# Patient Record
Sex: FEMALE | Race: WHITE | NOT HISPANIC OR LATINO | Employment: OTHER | URBAN - METROPOLITAN AREA
[De-identification: names, ages, dates, MRNs, and addresses within clinical notes are randomized per-mention and may not be internally consistent; named-entity substitution may affect disease eponyms.]

---

## 2015-09-28 LAB
ALT SERPL-CCNC: 19 UNITS/L (ref 9–52)
AST SERPL-CCNC: 23 UNITS/L (ref 14–36)
CHOLEST SERPL-MCNC: 187 MG/DL (ref 50–200)
CREAT SERPL-MCNC: 1 MG/DL (ref 0.5–1)
GLUCOSE SERPL-MCNC: 88 MG/DL (ref 65–105)
HDLC SERPL-MCNC: 34 MG/DL (ref 36–68)
LDLC SERPL CALC-MCNC: 118.2 MG/DL (ref 0–160)
POTASSIUM SERPL-SCNC: 3.6 MMOL/L (ref 3.5–5)
TRIGL SERPL-MCNC: 174 MG/DL (ref 10–250)

## 2016-04-20 LAB — TSH SERPL-ACNC: 3.35 MIU/ML (ref 0.49–4.67)

## 2017-04-22 ENCOUNTER — TRANSFERRED RECORDS (OUTPATIENT)
Dept: HEALTH INFORMATION MANAGEMENT | Facility: CLINIC | Age: 77
End: 2017-04-22

## 2017-05-11 ENCOUNTER — TRANSFERRED RECORDS (OUTPATIENT)
Dept: HEALTH INFORMATION MANAGEMENT | Facility: CLINIC | Age: 77
End: 2017-05-11

## 2017-05-11 LAB
ALT SERPL-CCNC: 27 UNITS/L (ref 9–52)
AST SERPL-CCNC: 18 UNITS/L (ref 14–36)
CHOLEST SERPL-MCNC: 299 MG/DL (ref 50–200)
CREAT SERPL-MCNC: 0.9 MG/DL (ref 0.5–1)
GLUCOSE SERPL-MCNC: 81 MG/DL (ref 65–105)
HDLC SERPL-MCNC: 42 MG/DL (ref 36–68)
LDLC SERPL CALC-MCNC: 227 MG/DL (ref 0–160)
POTASSIUM SERPL-SCNC: 4 MMOL/L (ref 3.6–5)
TRIGL SERPL-MCNC: 150 MG/DL (ref 10–250)

## 2017-11-13 NOTE — PROGRESS NOTES
"  SUBJECTIVE:   Swati Marinelli is a 77 year old female who presents to clinic today for the following health issues:      New Patient/Transfer of Care    {ACUTE Problem - extended histories:101448}    Problem list and histories reviewed & adjusted, as indicated.  Additional history: {NONE - AS DOCUMENTED:149741::\"as documented\"}    {HIST REVIEW/ LINKS 2:782696}    Reviewed and updated as needed this visit by clinical staff       Reviewed and updated as needed this visit by Provider         {PROVIDER CHARTING PREFERENCE:325276}  "

## 2017-11-14 ENCOUNTER — OFFICE VISIT (OUTPATIENT)
Dept: FAMILY MEDICINE | Facility: CLINIC | Age: 77
End: 2017-11-14
Payer: MEDICARE

## 2017-11-14 VITALS
HEART RATE: 104 BPM | HEIGHT: 60 IN | SYSTOLIC BLOOD PRESSURE: 133 MMHG | WEIGHT: 156.6 LBS | OXYGEN SATURATION: 92 % | TEMPERATURE: 97.7 F | BODY MASS INDEX: 30.74 KG/M2 | DIASTOLIC BLOOD PRESSURE: 72 MMHG

## 2017-11-14 DIAGNOSIS — Z71.89 ADVANCED DIRECTIVES, COUNSELING/DISCUSSION: ICD-10-CM

## 2017-11-14 DIAGNOSIS — M79.671 BILATERAL FOOT PAIN: ICD-10-CM

## 2017-11-14 DIAGNOSIS — M79.672 BILATERAL FOOT PAIN: ICD-10-CM

## 2017-11-14 DIAGNOSIS — F41.9 ANXIETY: ICD-10-CM

## 2017-11-14 DIAGNOSIS — G47.33 OSA (OBSTRUCTIVE SLEEP APNEA): ICD-10-CM

## 2017-11-14 DIAGNOSIS — I10 HYPERTENSION GOAL BP (BLOOD PRESSURE) < 150/90: Primary | ICD-10-CM

## 2017-11-14 DIAGNOSIS — I35.1 NONRHEUMATIC AORTIC VALVE INSUFFICIENCY: ICD-10-CM

## 2017-11-14 PROBLEM — R55 NEUROCARDIOGENIC SYNCOPE: Status: ACTIVE | Noted: 2017-11-14

## 2017-11-14 PROBLEM — E78.2 MIXED HYPERLIPIDEMIA: Status: ACTIVE | Noted: 2017-11-14

## 2017-11-14 PROCEDURE — 99203 OFFICE O/P NEW LOW 30 MIN: CPT | Performed by: FAMILY MEDICINE

## 2017-11-14 RX ORDER — BUSPIRONE HYDROCHLORIDE 10 MG/1
10 TABLET ORAL 3 TIMES DAILY
COMMUNITY
End: 2018-01-23

## 2017-11-14 RX ORDER — CETIRIZINE HYDROCHLORIDE 5 MG/1
5 TABLET ORAL DAILY
COMMUNITY
End: 2019-01-14

## 2017-11-14 RX ORDER — ROSUVASTATIN CALCIUM 5 MG/1
5 TABLET, COATED ORAL DAILY
COMMUNITY
End: 2017-12-12

## 2017-11-14 RX ORDER — LISINOPRIL AND HYDROCHLOROTHIAZIDE 20; 25 MG/1; MG/1
1 TABLET ORAL DAILY
COMMUNITY
End: 2017-12-12

## 2017-11-14 RX ORDER — PANTOPRAZOLE SODIUM 40 MG/1
40 TABLET, DELAYED RELEASE ORAL DAILY
COMMUNITY
End: 2019-04-24

## 2017-11-14 RX ORDER — CITALOPRAM HYDROBROMIDE 20 MG/1
20 TABLET ORAL DAILY
COMMUNITY
End: 2017-12-12

## 2017-11-14 RX ORDER — VERAPAMIL HYDROCHLORIDE 360 MG/1
1 CAPSULE, DELAYED RELEASE PELLETS ORAL DAILY
COMMUNITY
End: 2017-12-12

## 2017-11-14 RX ORDER — CLOPIDOGREL BISULFATE 75 MG/1
75 TABLET ORAL DAILY
COMMUNITY
End: 2017-12-12

## 2017-11-14 RX ORDER — POTASSIUM CHLORIDE 1.5 G/1.58G
20 POWDER, FOR SOLUTION ORAL DAILY
COMMUNITY
End: 2017-12-12

## 2017-11-14 RX ORDER — LORATADINE 10 MG/1
10 TABLET ORAL DAILY
COMMUNITY
End: 2018-07-11 | Stop reason: ALTCHOICE

## 2017-11-14 ASSESSMENT — PATIENT HEALTH QUESTIONNAIRE - PHQ9
SUM OF ALL RESPONSES TO PHQ QUESTIONS 1-9: 7
5. POOR APPETITE OR OVEREATING: MORE THAN HALF THE DAYS

## 2017-11-14 ASSESSMENT — ANXIETY QUESTIONNAIRES
6. BECOMING EASILY ANNOYED OR IRRITABLE: NOT AT ALL
IF YOU CHECKED OFF ANY PROBLEMS ON THIS QUESTIONNAIRE, HOW DIFFICULT HAVE THESE PROBLEMS MADE IT FOR YOU TO DO YOUR WORK, TAKE CARE OF THINGS AT HOME, OR GET ALONG WITH OTHER PEOPLE: SOMEWHAT DIFFICULT
3. WORRYING TOO MUCH ABOUT DIFFERENT THINGS: MORE THAN HALF THE DAYS
GAD7 TOTAL SCORE: 9
2. NOT BEING ABLE TO STOP OR CONTROL WORRYING: MORE THAN HALF THE DAYS
5. BEING SO RESTLESS THAT IT IS HARD TO SIT STILL: NOT AT ALL
1. FEELING NERVOUS, ANXIOUS, OR ON EDGE: NEARLY EVERY DAY
7. FEELING AFRAID AS IF SOMETHING AWFUL MIGHT HAPPEN: NOT AT ALL

## 2017-11-14 ASSESSMENT — PAIN SCALES - GENERAL: PAINLEVEL: NO PAIN (0)

## 2017-11-14 NOTE — MR AVS SNAPSHOT
After Visit Summary   11/14/2017    Swati Marinelli    MRN: [id number]           Patient Information     Date Of Birth          1940        Visit Information        Provider Department      11/14/2017 8:15 AM Deborah Terrell MD [facility]        Today's Diagnoses     MANNY (obstructive sleep apnea)    -  1    Advanced directives, counseling/discussion        Hypertension goal BP (blood pressure) < 150/90        Anxiety        Bilateral foot pain          Care Instructions      Plan for fasting labs and a visit with Deborah Terrell MD in early January, sooner if you need it.                Follow-ups after your visit        Additional Services     PODIATRY/FOOT & ANKLE SURGERY REFERRAL       Your provider has referred you to: [facility]: [facility] [phone]   [email or web address]    Please be aware that coverage of these services is subject to the terms and limitations of your health insurance plan.  Call member services at your health plan with any benefit or coverage questions.      Please bring the following to your appointment:  >>   Any x-rays, CTs or MRIs which have been performed.  Contact the facility where they were done to arrange for  prior to your scheduled appointment.    >>   List of current medications   >>   This referral request   >>   Any documents/labs given to you for this referral            SLEEP EVALUATION & MANAGEMENT REFERRAL - ADULT -[facility] [place]  [phone] (Age 15 and up)       Please be aware that coverage of these services is subject to the terms and limitations of your health insurance plan.  Call member services at your health plan with any benefit or coverage questions.      Please bring the following to your appointment:    >>   List of current medications   >>   This referral request   >>   Any documents/labs given to you for this referral                      Future tests that  "were ordered for you today     Open Future Orders        Priority Expected Expires Ordered    SLEEP EVALUATION & MANAGEMENT REFERRAL - ADULT -[facility] [place]  [phone] (Age 15 and up) Routine  2018            Who to contact     If you have questions or need follow up information about today's clinic visit or your schedule please contact [facility] directly at [phone].  Normal or non-critical lab and imaging results will be communicated to you by Microtunehart, letter or phone within 4 business days after the clinic has received the results. If you do not hear from us within 7 days, please contact the clinic through anfixt or phone. If you have a critical or abnormal lab result, we will notify you by phone as soon as possible.  Submit refill requests through [email or web address] or call your pharmacy and they will forward the refill request to us. Please allow 3 business days for your refill to be completed.          Additional Information About Your Visit        [email or web address] Information     [email or web address] lets you send messages to your doctor, view your test results, renew your prescriptions, schedule appointments and more. To sign up, go to [email or web address] . Click on \"Log in\" on the left side of the screen, which will take you to the Welcome page. Then click on \"Sign up Now\" on the right side of the page.     You will be asked to enter the access code listed below, as well as some personal information. Please follow the directions to create your username and password.     Your access code is: [email or web address]  Expires: 2018  9:11 AM     Your access code will  in 90 days. If you need help or a new code, please call your [facility] or [phone].        Care EveryWhere ID     This is your Care EveryWhere ID. This could be used by other organizations to access your [place] medical records  [id number]        Your Vitals Were     Pulse Temperature Height Pulse " "Oximetry BMI (Body Mass Index)       104 97.7  F (36.5  C) (Oral) 4' 11.5\" (1.511 m) 92% 31.1 kg/m2        Blood Pressure from Last 3 Encounters:   11/14/17 133/72    Weight from Last 3 Encounters:   11/14/17 156 lb 9.6 oz (71 kg)              We Performed the Following     PODIATRY/FOOT & ANKLE SURGERY REFERRAL        Primary Care Provider Office Phone # Fax #    Deborah Gisela Terrell [phone] [address] [facility] [address]        Equal Access to Services     [facility]: Hadii aad ku hadasho Soomaali, waaxda luqadaha, qaybta kaalmada adeegyada, rishi talavera hayaan tony shaffer . So [facility] [phone].    ATENCIÓN: Si habla español, tiene a apodaca disposición servicios gratuitos de asistencia lingüística. [facility] [phone].    We comply with applicable federal civil rights laws and [place] laws. We do not discriminate on the basis of race, color, national origin, age, disability, sex, sexual orientation, or gender identity.            Thank you!     Thank you for choosing [facility]  for your care. Our goal is always to provide you with excellent care. Hearing back from our patients is one way we can continue to improve our services. Please take a few minutes to complete the written survey that you may receive in the mail after your visit with us. Thank you!             Your Updated Medication List - Protect others around you: Learn how to safely use, store and throw away your medicines at www.disposemymeds.org.          This list is accurate as of: 11/14/17  9:11 AM.  Always use your most recent med list.                   Brand Name Dispense Instructions for use Diagnosis    busPIRone 10 MG tablet    BUSPAR     Take 10 mg by mouth 3 times daily        celeXA 20 MG tablet   Generic drug:  citalopram      Take 20 mg by mouth daily        cetirizine 5 MG tablet    zyrTEC     Take 5 mg by mouth daily        CRESTOR 5 MG tablet   Generic drug:  rosuvastatin     "  Take 5 mg by mouth daily        lisinopril-hydrochlorothiazide 20-25 MG per tablet    PRINZIDE/ZESTORETIC     Take 1 tablet by mouth daily        loratadine 10 MG tablet    CLARITIN     Take 10 mg by mouth daily        PLAVIX 75 MG tablet   Generic drug:  clopidogrel      Take 75 mg by mouth daily        potassium chloride 20 MEQ Packet    KLOR-CON     Take 20 mEq by mouth daily        PROTONIX 40 MG EC tablet   Generic drug:  pantoprazole      Take 40 mg by mouth daily        verapamil HCl  MG Cp24      Take 1 capsule by mouth daily

## 2017-11-14 NOTE — PATIENT INSTRUCTIONS
Plan for fasting labs and a visit with Deborah Terrell MD in early January, sooner if you need it.

## 2017-11-14 NOTE — NURSING NOTE
"Chief Complaint   Patient presents with     Establish Care       Initial /72  Pulse 104  Temp 97.7  F (36.5  C) (Oral)  Ht 4' 11.5\" (1.511 m)  Wt 156 lb 9.6 oz (71 kg)  SpO2 92%  BMI 31.1 kg/m2 Estimated body mass index is 31.1 kg/(m^2) as calculated from the following:    Height as of this encounter: 4' 11.5\" (1.511 m).    Weight as of this encounter: 156 lb 9.6 oz (71 kg)..  BP completed using cuff size: addison Aviles CMA    "

## 2017-11-14 NOTE — PROGRESS NOTES
SUBJECTIVE:   Swati Marinelli is a 77 year old female who presents to clinic today for the following health issues:      Hyperlipidemia Follow-Up      Rate your low fat/cholesterol diet?: good    Taking statin?  Yes, no muscle aches from statin    Other lipid medications/supplements?:  none    Hypertension Follow-up      Outpatient blood pressures are being checked at home.  Results are to goal.    Low Salt Diet: no added salt    Hypertension ROS: taking medications as instructed, no medication side effects noted, no TIA's, no chest pain on exertion, no dyspnea on exertion, no swelling of ankles.  No headache or visual changes.     Depression and Anxiety Follow-Up    Status since last visit: Worsened due to move and stress of change    Other associated symptoms:None    Complicating factors:     Significant life event: Yes-  Move to [place] from [place]     Current substance abuse: None    PHQ-9 Score and MyChart F/U Questions 11/14/2017   Total Score 7   Q9: Suicide Ideation Not at all     OMA-7 SCORE 11/14/2017   Total Score 9       PHQ-9  English  PHQ-9   Any Language  GAD7  Suicide Assessment Five-step Evaluation and Treatment (SAFE-T)    C/o bilateral foot pain over top of feet. Has had h/o stress fractures in past. Would like to see podiatry, has high arches, has used inserts in past but not currently.    Has severe left knee OA, s/p TKA in 2011 with revision 2013, difficulty walking distances      Amount of exercise or physical activity: None    Problems taking medications regularly: No    Medication side effects: none  Diet: low salt          Problem list and histories reviewed & adjusted, as indicated.  Additional history: as documented    Patient Active Problem List   Diagnosis     Advanced directives, counseling/discussion     Hypertension goal BP (blood pressure) < 150/90     Neurocardiogenic syncope     Mixed hyperlipidemia     MANNY (obstructive sleep apnea)     Nonrheumatic aortic valve insufficiency     Anxiety      "Gastroesophageal reflux disease without esophagitis     TIA (transient ischemic attack)     History reviewed. No pertinent surgical history.    Social History   Substance Use Topics     Smoking status: Never Smoker     Smokeless tobacco: Never Used     Alcohol use No     Family History   Problem Relation Age of Onset     Tuberculosis Mother      Myocardial Infarction Father      Myocardial Infarction Brother      Heart Surgery Daughter          Current Outpatient Prescriptions   Medication Sig Dispense Refill     busPIRone (BUSPAR) 10 MG tablet Take 10 mg by mouth 3 times daily       rosuvastatin (CRESTOR) 5 MG tablet Take 5 mg by mouth daily       cetirizine (ZYRTEC) 5 MG tablet Take 5 mg by mouth daily       pantoprazole (PROTONIX) 40 MG EC tablet Take 40 mg by mouth daily       loratadine (CLARITIN) 10 MG tablet Take 10 mg by mouth daily       potassium chloride (KLOR-CON) 20 MEQ Packet Take 20 mEq by mouth daily       verapamil HCl  MG CP24 Take 1 capsule by mouth daily       citalopram (CELEXA) 20 MG tablet Take 20 mg by mouth daily       clopidogrel (PLAVIX) 75 MG tablet Take 75 mg by mouth daily       lisinopril-hydrochlorothiazide (PRINZIDE/ZESTORETIC) 20-25 MG per tablet Take 1 tablet by mouth daily       BP Readings from Last 3 Encounters:   11/14/17 133/72    Wt Readings from Last 3 Encounters:   11/15/17 156 lb (70.8 kg)   11/14/17 156 lb 9.6 oz (71 kg)                  Labs reviewed in EPIC        Reviewed and updated as needed this visit by clinical staffTobacco  Allergies  Meds  Problems  Med Hx  Surg Hx  Fam Hx  Soc Hx        Reviewed and updated as needed this visit by Provider         ROS:  Constitutional, HEENT, cardiovascular, pulmonary, gi and gu systems are negative, except as otherwise noted.      OBJECTIVE:   /72  Pulse 104  Temp 97.7  F (36.5  C) (Oral)  Ht 4' 11.5\" (1.511 m)  Wt 156 lb 9.6 oz (71 kg)  SpO2 92%  BMI 31.1 kg/m2  Body mass index is 31.1 " kg/(m^2).  GENERAL: healthy, alert and no distress  EYES: Eyes grossly normal to inspection, PERRL and conjunctivae and sclerae normal  NECK: no adenopathy, no asymmetry, masses, or scars and thyroid normal to palpation  RESP: lungs clear to auscultation - no rales, rhonchi or wheezes  CV: regular rates and rhythm, normal S1 S2, no S3 or S4, grade 2/6 diastolic murmur heard best over the RUSB c/w AI, peripheral pulses strong and no peripheral edema  MS: no gross musculoskeletal defects noted, no edema  SKIN: no suspicious lesions or rashes  PSYCH: mentation appears normal, anxious and fatigued    Diagnostic Test Results:  none     ASSESSMENT/PLAN:     (I10) Hypertension goal BP (blood pressure) < 150/90  (primary encounter diagnosis)  Comment: to goal  Plan: pt does not need refills at this time  f/u 6 months for OV and labs - wellness exam  Ok to refill if needed until 5/18    (F41.9) Anxiety  Comment: worsened with stress of moving  Plan: has not been taking buspar regularly, will work to take regularly with citalopram  See above for f/u. Ok to refill if needed until 5/18    (M79.671,  M79.672) Bilateral foot pain  Comment: likely due to high arch  Plan: PODIATRY/FOOT & ANKLE SURGERY REFERRAL        Refer to podiatry    (I35.1) Nonrheumatic aortic valve insufficiency  Comment: known  Plan: review records, pt asymptomatic      (G47.33) MANNY (obstructive sleep apnea)  Comment: needs eval  Plan: SLEEP EVALUATION & MANAGEMENT REFERRAL - ADULT         -[facility]          [phone] (Age 15 and up)        Referral given    (Z71.89) Advanced directives, counseling/discussion  Comment: due  Plan: infor given    See Patient Instructions    Deborah Terrell MD  [facility]

## 2017-11-15 ENCOUNTER — RADIANT APPOINTMENT (OUTPATIENT)
Dept: GENERAL RADIOLOGY | Facility: CLINIC | Age: 77
End: 2017-11-15
Attending: PODIATRIST
Payer: MEDICARE

## 2017-11-15 ENCOUNTER — OFFICE VISIT (OUTPATIENT)
Dept: PODIATRY | Facility: CLINIC | Age: 77
End: 2017-11-15
Payer: MEDICARE

## 2017-11-15 VITALS — OXYGEN SATURATION: 96 % | BODY MASS INDEX: 30.98 KG/M2 | HEART RATE: 100 BPM | WEIGHT: 156 LBS

## 2017-11-15 DIAGNOSIS — M76.72 PERONEAL TENDINITIS OF LEFT LOWER EXTREMITY: ICD-10-CM

## 2017-11-15 DIAGNOSIS — M76.72 PERONEAL TENDINITIS OF LEFT LOWER EXTREMITY: Primary | ICD-10-CM

## 2017-11-15 PROCEDURE — 73630 X-RAY EXAM OF FOOT: CPT | Mod: LT

## 2017-11-15 PROCEDURE — 99203 OFFICE O/P NEW LOW 30 MIN: CPT | Performed by: PODIATRIST

## 2017-11-15 ASSESSMENT — ANXIETY QUESTIONNAIRES: GAD7 TOTAL SCORE: 9

## 2017-11-15 NOTE — MR AVS SNAPSHOT
After Visit Summary   11/15/2017    Swati Marinelli    MRN: [id number]           Patient Information     Date Of Birth          1940        Visit Information        Provider Department      11/15/2017 8:45 AM Narayan Alejandra, [facility] Instructions    We wish you continued good healing. If you have any questions or concerns, please do not hesitate to contact us at [phone]      Please remember to call and schedule a follow up appointment if one was recommended at your earliest convenience.   PODIATRY CLINIC HOURS  TELEPHONE NUMBER    Dr. Narayan YANGPKATYA FAC FAS    Clinics:  [facility]        Jennifer Astudillo MA  Medical Assistant  Tuesday 1PM-6PM  [facility]  Wednesday 7AM-2PM  [place]  Thursday 10AM-6PM  [place] Friday 7AM-345PM  [place]  Specialty schedulers:   [phone] to make an appointment with any Specialty Provider.        Urgent Care locations:    [facility] Monday-Friday 5 pm - 9 pm. Saturday-Sunday 9 am -5pm    Monday-Friday 11 am - 9 pm Saturday 9 am - 5 pm     Monday-Sunday 12 noon-8PM [phone]     If you need a medication refill, please contact us you may need lab work and/or a follow up visit prior to your refill (i.e. Antifungal medications).    [employer]art (secure e-mail communication and access to your chart) to send a message or to make an appointment.    If MRI needed please call [facility] at [phone]        Weight management plan: Patient was referred to their PCP to discuss a diet and exercise plan.            Follow-ups after your visit        Your next 10 appointments already scheduled     Jan 02, 2018  9:00 AM CST   [facility]    [address] [facility] [address] [phone]           Please do not eat 10-12  "hours before your appointment if you are coming in fasting for labs on lipids, cholesterol, or glucose (sugar). This does not apply to pregnant women. Water, hot tea and black coffee (with nothing added) are okay. Do not drink other fluids, diet soda or chew gum.            Jan 09, 2018 10:15 AM CST   [facility]   [facility]    [address] [facility] [address] [phone]           Bring a current list of meds and any records pertaining to this visit. For Physicals, please bring immunization records and any forms needing to be filled out. Please arrive 10 minutes early to complete paperwork.              Future tests that were ordered for you today     Open Future Orders        Priority Expected Expires Ordered    SLEEP EVALUATION & MANAGEMENT REFERRAL - ADULT -[facility] [place]  [phone] (Age 15 and up) Routine  11/14/2018 11/14/2017            Who to contact     If you have questions or need follow up information about today's clinic visit or your schedule please contact [facility] directly at [phone].  Normal or non-critical lab and imaging results will be communicated to you by MyChart, letter or phone within 4 business days after the clinic has received the results. If you do not hear from us within 7 days, please contact the clinic through Amplion Clinical Communicationshart or phone. If you have a critical or abnormal lab result, we will notify you by phone as soon as possible.  Submit refill requests through [employer] or call your pharmacy and they will forward the refill request to us. Please allow 3 business days for your refill to be completed.          Additional Information About Your Visit        Amplion Clinical Communicationshart Information     [employer] lets you send messages to your doctor, view your test results, renew your prescriptions, schedule appointments and more. To sign up, go to [email or web address] . Click on \"Log " "in\" on the left side of the screen, which will take you to the Welcome page. Then click on \"Sign up Now\" on the right side of the page.     You will be asked to enter the access code listed below, as well as some personal information. Please follow the directions to create your username and password.     Your access code is: [email or web address]  Expires: 2018  9:11 AM     Your access code will  in 90 days. If you need help or a new code, please call your [place] clinic or [phone].        Care EveryWhere ID     This is your Care EveryWhere ID. This could be used by other organizations to access your [place] medical records  [id number]        Your Vitals Were     Pulse Pulse Oximetry BMI (Body Mass Index)             100 96% 30.98 kg/m2          Blood Pressure from Last 3 Encounters:   17 133/72    Weight from Last 3 Encounters:   11/15/17 156 lb (70.8 kg)   17 156 lb 9.6 oz (71 kg)              Today, you had the following     No orders found for display       Primary Care Provider Office Phone # Fax #    Deborah Terrell [phone] [address] [facility] [address]        Equal Access to Services     SHALINI BALDWIN : Hadii aad ku hadasho Soomaali, waaxda luqadaha, qaybta kaalmada adeegyada, waxay idiin haymarjn tony shaffer . So [facility] [phone].    ATENCIÓN: Si habla español, tiene a apodaca disposición servicios gratuitos de asistencia lingüística. Llame al [phone].    We comply with applicable federal civil rights laws and [place] laws. We do not discriminate on the basis of race, color, national origin, age, disability, sex, sexual orientation, or gender identity.            Thank you!     Thank you for choosing [facility]  for your care. Our goal is always to provide you with excellent care. Hearing back from our patients is one way we can continue to improve our services. Please take a few minutes to complete the written survey that " you may receive in the mail after your visit with us. Thank you!             Your Updated Medication List - Protect others around you: Learn how to safely use, store and throw away your medicines at www.disposemymeds.org.          This list is accurate as of: 11/15/17  8:52 AM.  Always use your most recent med list.                   Brand Name Dispense Instructions for use Diagnosis    busPIRone 10 MG tablet    BUSPAR     Take 10 mg by mouth 3 times daily        celeXA 20 MG tablet   Generic drug:  citalopram      Take 20 mg by mouth daily        cetirizine 5 MG tablet    zyrTEC     Take 5 mg by mouth daily        CRESTOR 5 MG tablet   Generic drug:  rosuvastatin      Take 5 mg by mouth daily        lisinopril-hydrochlorothiazide 20-25 MG per tablet    PRINZIDE/ZESTORETIC     Take 1 tablet by mouth daily        loratadine 10 MG tablet    CLARITIN     Take 10 mg by mouth daily        PLAVIX 75 MG tablet   Generic drug:  clopidogrel      Take 75 mg by mouth daily        potassium chloride 20 MEQ Packet    KLOR-CON     Take 20 mEq by mouth daily        PROTONIX 40 MG EC tablet   Generic drug:  pantoprazole      Take 40 mg by mouth daily        verapamil HCl  MG Cp24      Take 1 capsule by mouth daily

## 2017-11-15 NOTE — PATIENT INSTRUCTIONS
We wish you continued good healing. If you have any questions or concerns, please do not hesitate to contact us at [phone]      Please remember to call and schedule a follow up appointment if one was recommended at your earliest convenience.   PODIATRY CLINIC HOURS  TELEPHONE NUMBER    Dr. Narayan Alejandra D.P.[facility]    Clinics:  [facility]        Jennifer Astudillo MA  Medical Assistant  Tuesday 1PM-6PM  [place] [facility]  Wednesday 7AM-2PM  [place]  Thursday 10AM-6PM  [place] Friday 7AM-345PM  [place]  Specialty schedulers:   [phone] to make an appointment with any Specialty Provider.        Urgent Care locations:    [facility] [place] Monday-Friday 5 pm - 9 pm. Saturday-Sunday 9 am -5pm    Monday-Friday 11 am - 9 pm Saturday 9 am - 5 pm     Monday-Sunday 12 noon-8PM [phone]     If you need a medication refill, please contact us you may need lab work and/or a follow up visit prior to your refill (i.e. Antifungal medications).    [employer] (secure e-mail communication and access to your chart) to send a message or to make an appointment.    If MRI needed please call [facility] at [phone]        Weight management plan: Patient was referred to their PCP to discuss a diet and exercise plan.

## 2017-11-15 NOTE — LETTER
11/15/2017         RE: Swati Marinelli  [address]  [place] [place] [address]        Dear Colleague,    Thank you for referring your patient, Swati Marinelli, to the [facility]. Please see a copy of my visit note below.    Subjective:    Pt is seen today in consult from Dr. Terrell with the c/c of painful left foot.  This has been symptomatic for the past 3 months.  Pt denies any hx of trauma.  Aggravated by activity and releaved by rest.  Describes as burning pain.  Is slowly getting worse.  Feels better wearing good shoes.  Point to peroneal tendon course lateral calcaneus as to wear it hurts.  Denies erythema, ecchymosis, snapping, weakness.  She just moved here from [place] and on her feet a lot.  In house for a month with lots of stairs and this made it worse.  Had injection in peroneal tendons in [place] and this helped for a while.         ROS:  A 10-point review of systems was performed and is positive for that noted in the HPI and as seen above.  All other areas are negative.         Allergies   Allergen Reactions     Aleve [Naproxen]      Asa [Aspirin]      Codeine      Doxycycline      Elavil [Amitriptyline]      Etodolac      Gabapentin      Lyrica [Pregabalin]      Mushroom      Oxycodone      Oxycodone-Acetaminophen      Vicodin [Hydrocodone-Acetaminophen]        Current Outpatient Prescriptions   Medication Sig Dispense Refill     busPIRone (BUSPAR) 10 MG tablet Take 10 mg by mouth 3 times daily       rosuvastatin (CRESTOR) 5 MG tablet Take 5 mg by mouth daily       cetirizine (ZYRTEC) 5 MG tablet Take 5 mg by mouth daily       pantoprazole (PROTONIX) 40 MG EC tablet Take 40 mg by mouth daily       loratadine (CLARITIN) 10 MG tablet Take 10 mg by mouth daily       potassium chloride (KLOR-CON) 20 MEQ Packet Take 20 mEq by mouth daily       verapamil HCl  MG CP24 Take 1 capsule by mouth daily       citalopram (CELEXA) 20 MG tablet Take 20 mg by mouth daily       clopidogrel (PLAVIX) 75  MG tablet Take 75 mg by mouth daily       lisinopril-hydrochlorothiazide (PRINZIDE/ZESTORETIC) 20-25 MG per tablet Take 1 tablet by mouth daily         Patient Active Problem List   Diagnosis     Advanced directives, counseling/discussion     Hypertension goal BP (blood pressure) < 150/90     Neurocardiogenic syncope     Mixed hyperlipidemia     MANNY (obstructive sleep apnea)     Nonrheumatic aortic valve insufficiency     Anxiety     Gastroesophageal reflux disease without esophagitis     TIA (transient ischemic attack)       No past medical history on file.    No past surgical history on file.    Family History   Problem Relation Age of Onset     Tuberculosis Mother      Myocardial Infarction Father      Myocardial Infarction Brother      Heart Surgery Daughter        Social History   Substance Use Topics     Smoking status: Never Smoker     Smokeless tobacco: Never Used     Alcohol use No             O:  Pulse 100  Wt 156 lb (70.8 kg)  SpO2 96%  BMI 30.98 kg/m2.     Constitutional/ general:  Pt is in no apparent distress, appears well-nourished.  Cooperative with history and physical exam.     Psych:  The patient answered questions appropriately.  Normal affect.  Seems to have reasonable expectations, in terms of treatment.     Eyes:  Visual scanning/ tracking without deficit.     Ears:  Response to auditory stimuli is normal.  No  hearing aid devices.  Auricles in proper alignment.     Lymphatic:  Popliteal lymph nodes not enlarged.     Lungs:  Non labored breathing, non labored speech. No cough.  No audible wheezing. Even, quiet breathing.       Vascular:  Pedal pulses are palpable bilaterally for both the DP and PT arteries.  CFT < 3 sec.  No edema.  Pedal hair growth noted.     Neuro:  Alert and oriented x 3. Coordinated gait.  Light touch sensation is intact to the L4, L5, S1 distributions. No obvious deficits.  No evidence of neurological-based weakness, spasticity, or contracture in the lower  extremities.     Derm: Normal texture and turgor.  No erythema, ecchymosis, or cyanosis.  No open lesions.     Musculoskeletal:    Lower extremity muscle strength is normal.  Patient is ambulatory without an assistive device or brace.     Cavus foot with weightbearing bilateral.  No forefoot or rear foot deformities noted.  MS 5/5 all compartments.  Normal ROM all fore foot and rearfoot joints with no pain or crepitus.  No equinus.  Pain left foot over peroneal tendon course lateral calcaneus and lateral malleoli.  No subluxation.  Slight edema.  No masses.  No ecchymosis.      Radiographic Exam:  X-Ray Findings:  I personally reviewed the films, normal    A:  Cavus foot with peroneal tendonitis    Plan:  X-rays taken today.  Discussed etiology and treatment options in detail w/ the pt.  Explained how more stairs and moving work this tendon more.  Ice bid.  Will dispense cam walker today.  Patient to wear this at all times while walking.  When not walking patient will take this off and do ROM to prevent blood clot and joint stiffness.  Patient will not sleep with this on.  Return to clinic 2 weeks.  Thank you for allowing me participate in the care of this patient.        Narayan Alejandra DPM, FACFAS         Again, thank you for allowing me to participate in the care of your patient.        Sincerely,        Narayan Alejandra DPM

## 2017-11-15 NOTE — PROGRESS NOTES
Subjective:    Pt is seen today in consult from Dr. Terrell with the c/c of painful left foot.  This has been symptomatic for the past 3 months.  Pt denies any hx of trauma.  Aggravated by activity and releaved by rest.  Describes as burning pain.  Is slowly getting worse.  Feels better wearing good shoes.  Point to peroneal tendon course lateral calcaneus as to wear it hurts.  Denies erythema, ecchymosis, snapping, weakness.  She just moved here from [place] and on her feet a lot.  In house for a month with lots of stairs and this made it worse.  Had injection in peroneal tendons in [place] and this helped for a while.         ROS:  A 10-point review of systems was performed and is positive for that noted in the HPI and as seen above.  All other areas are negative.         Allergies   Allergen Reactions     Aleve [Naproxen]      Asa [Aspirin]      Codeine      Doxycycline      Elavil [Amitriptyline]      Etodolac      Gabapentin      Lyrica [Pregabalin]      Mushroom      Oxycodone      Oxycodone-Acetaminophen      Vicodin [Hydrocodone-Acetaminophen]        Current Outpatient Prescriptions   Medication Sig Dispense Refill     busPIRone (BUSPAR) 10 MG tablet Take 10 mg by mouth 3 times daily       rosuvastatin (CRESTOR) 5 MG tablet Take 5 mg by mouth daily       cetirizine (ZYRTEC) 5 MG tablet Take 5 mg by mouth daily       pantoprazole (PROTONIX) 40 MG EC tablet Take 40 mg by mouth daily       loratadine (CLARITIN) 10 MG tablet Take 10 mg by mouth daily       potassium chloride (KLOR-CON) 20 MEQ Packet Take 20 mEq by mouth daily       verapamil HCl  MG CP24 Take 1 capsule by mouth daily       citalopram (CELEXA) 20 MG tablet Take 20 mg by mouth daily       clopidogrel (PLAVIX) 75 MG tablet Take 75 mg by mouth daily       lisinopril-hydrochlorothiazide (PRINZIDE/ZESTORETIC) 20-25 MG per tablet Take 1 tablet by mouth daily         Patient Active Problem List   Diagnosis     Advanced directives, counseling/discussion      Hypertension goal BP (blood pressure) < 150/90     Neurocardiogenic syncope     Mixed hyperlipidemia     MANNY (obstructive sleep apnea)     Nonrheumatic aortic valve insufficiency     Anxiety     Gastroesophageal reflux disease without esophagitis     TIA (transient ischemic attack)       No past medical history on file.    No past surgical history on file.    Family History   Problem Relation Age of Onset     Tuberculosis Mother      Myocardial Infarction Father      Myocardial Infarction Brother      Heart Surgery Daughter        Social History   Substance Use Topics     Smoking status: Never Smoker     Smokeless tobacco: Never Used     Alcohol use No             O:  Pulse 100  Wt 156 lb (70.8 kg)  SpO2 96%  BMI 30.98 kg/m2.     Constitutional/ general:  Pt is in no apparent distress, appears well-nourished.  Cooperative with history and physical exam.     Psych:  The patient answered questions appropriately.  Normal affect.  Seems to have reasonable expectations, in terms of treatment.     Eyes:  Visual scanning/ tracking without deficit.     Ears:  Response to auditory stimuli is normal.  No  hearing aid devices.  Auricles in proper alignment.     Lymphatic:  Popliteal lymph nodes not enlarged.     Lungs:  Non labored breathing, non labored speech. No cough.  No audible wheezing. Even, quiet breathing.       Vascular:  Pedal pulses are palpable bilaterally for both the DP and PT arteries.  CFT < 3 sec.  No edema.  Pedal hair growth noted.     Neuro:  Alert and oriented x 3. Coordinated gait.  Light touch sensation is intact to the L4, L5, S1 distributions. No obvious deficits.  No evidence of neurological-based weakness, spasticity, or contracture in the lower extremities.     Derm: Normal texture and turgor.  No erythema, ecchymosis, or cyanosis.  No open lesions.     Musculoskeletal:    Lower extremity muscle strength is normal.  Patient is ambulatory without an assistive device or brace.     Cavus foot  with weightbearing bilateral.  No forefoot or rear foot deformities noted.  MS 5/5 all compartments.  Normal ROM all fore foot and rearfoot joints with no pain or crepitus.  No equinus.  Pain left foot over peroneal tendon course lateral calcaneus and lateral malleoli.  No subluxation.  Slight edema.  No masses.  No ecchymosis.      Radiographic Exam:  X-Ray Findings:  I personally reviewed the films, normal    A:  Cavus foot with peroneal tendonitis    Plan:  X-rays taken today.  Discussed etiology and treatment options in detail w/ the pt.  Explained how more stairs and moving work this tendon more.  Ice bid.  Will dispense cam walker today.  Patient to wear this at all times while walking.  When not walking patient will take this off and do ROM to prevent blood clot and joint stiffness.  Patient will not sleep with this on.  Return to clinic 2 weeks.  Thank you for allowing me participate in the care of this patient.        Narayan Alejandra DPM, FACFAS

## 2017-11-15 NOTE — NURSING NOTE
"Chief Complaint   Patient presents with     Foot Pain     L > R recent move from [place]       Initial Pulse 100  Wt 156 lb (70.8 kg)  SpO2 96%  BMI 30.98 kg/m2 Estimated body mass index is 30.98 kg/(m^2) as calculated from the following:    Height as of 11/14/17: 4' 11.5\" (1.511 m).    Weight as of this encounter: 156 lb (70.8 kg).  Medication Reconciliation: complete  "

## 2017-11-29 ENCOUNTER — OFFICE VISIT (OUTPATIENT)
Dept: PODIATRY | Facility: CLINIC | Age: 77
End: 2017-11-29
Payer: MEDICARE

## 2017-11-29 VITALS
SYSTOLIC BLOOD PRESSURE: 115 MMHG | HEART RATE: 99 BPM | DIASTOLIC BLOOD PRESSURE: 68 MMHG | WEIGHT: 156 LBS | BODY MASS INDEX: 30.98 KG/M2

## 2017-11-29 DIAGNOSIS — M76.72 PERONEAL TENDINITIS OF LEFT LOWER EXTREMITY: Primary | ICD-10-CM

## 2017-11-29 PROCEDURE — 99213 OFFICE O/P EST LOW 20 MIN: CPT | Performed by: PODIATRIST

## 2017-11-29 NOTE — LETTER
11/29/2017         RE: Swati Marinelli  [address]  [place] [address]        Dear Colleague,    Thank you for referring your patient, Swati Marinelli, to the [facility]. Please see a copy of my visit note below.    Subjective:    11/15/17  Pt is seen today in consult from Dr. Terrell with the c/c of painful left foot.  This has been symptomatic for the past 3 months.  Pt denies any hx of trauma.  Aggravated by activity and releaved by rest.  Describes as burning pain.  Is slowly getting worse.  Feels better wearing good shoes.  Point to peroneal tendon course lateral calcaneus as to wear it hurts.  Denies erythema, ecchymosis, snapping, weakness.  She just moved here from [place] and on her feet a lot.  In house for a month with lots of stairs and this made it worse.  Had injection in peroneal tendons in [place] and this helped for a while.      11/29/17  Patient has been wearing cam walker.  Foot slightly better, but still pain lateral left ankle         ROS:  Denies  popping, erythema, weakness.         Allergies   Allergen Reactions     Aleve [Naproxen]      Asa [Aspirin]      Codeine      Doxycycline      Elavil [Amitriptyline]      Etodolac      Gabapentin      Lyrica [Pregabalin]      Mushroom      Oxycodone      Oxycodone-Acetaminophen      Vicodin [Hydrocodone-Acetaminophen]        Current Outpatient Prescriptions   Medication Sig Dispense Refill     busPIRone (BUSPAR) 10 MG tablet Take 10 mg by mouth 3 times daily       rosuvastatin (CRESTOR) 5 MG tablet Take 5 mg by mouth daily       cetirizine (ZYRTEC) 5 MG tablet Take 5 mg by mouth daily       pantoprazole (PROTONIX) 40 MG EC tablet Take 40 mg by mouth daily       loratadine (CLARITIN) 10 MG tablet Take 10 mg by mouth daily       potassium chloride (KLOR-CON) 20 MEQ Packet Take 20 mEq by mouth daily       verapamil HCl  MG CP24 Take 1 capsule by mouth daily       citalopram (CELEXA) 20 MG tablet Take 20 mg by mouth daily        clopidogrel (PLAVIX) 75 MG tablet Take 75 mg by mouth daily       lisinopril-hydrochlorothiazide (PRINZIDE/ZESTORETIC) 20-25 MG per tablet Take 1 tablet by mouth daily         Patient Active Problem List   Diagnosis     Advanced directives, counseling/discussion     Hypertension goal BP (blood pressure) < 150/90     Neurocardiogenic syncope     Mixed hyperlipidemia     MANNY (obstructive sleep apnea)     Nonrheumatic aortic valve insufficiency     Anxiety     Gastroesophageal reflux disease without esophagitis     TIA (transient ischemic attack)       Past Medical History:   Diagnosis Date     Anxiety      Aortic regurgitation      GERD (gastroesophageal reflux disease)      HTN (hypertension)      Neurocardiogenic syncope      MANNY (obstructive sleep apnea)      TIA (transient ischemic attack)     occiptal       Past Surgical History:   Procedure Laterality Date     APPENDECTOMY OPEN CHILD  1951     C TOTAL KNEE ARTHROPLASTY Left 2011     C TOTAL KNEE ARTHROPLASTY Left 2013    revision     CARPAL TUNNEL RELEASE RT/LT       CATARACT IOL, RT/LT  2009    right 2/4/09, left 2/21/09     CHOLECYSTECTOMY  06/2012     COLONOSCOPY  2012    normal     MOHS MICROGRAPHIC PROCEDURE  2011    back - 2011, lip and back 5/2012     ROTATOR CUFF REPAIR RT/LT Right 2009     ROTATOR CUFF REPAIR RT/LT Left 04/20/2016     TONSILLECTOMY         Family History   Problem Relation Age of Onset     Tuberculosis Mother      Myocardial Infarction Father      Myocardial Infarction Brother      Heart Surgery Daughter        Social History   Substance Use Topics     Smoking status: Never Smoker     Smokeless tobacco: Never Used     Alcohol use No             O:  /68 (BP Location: Right arm, Patient Position: Sitting, Cuff Size: Adult Regular)  Pulse 99  Wt 156 lb (70.8 kg)  BMI 30.98 kg/m2.     Constitutional/ general:  Pt is in no apparent distress, appears well-nourished.  Cooperative with history and physical exam.     Psych:  The patient  answered questions appropriately.  Normal affect.  Seems to have reasonable expectations, in terms of treatment.     Eyes:  Visual scanning/ tracking without deficit.     Ears:  Response to auditory stimuli is normal.  No  hearing aid devices.  Auricles in proper alignment.     Lymphatic:  Popliteal lymph nodes not enlarged.     Lungs:  Non labored breathing, non labored speech. No cough.  No audible wheezing. Even, quiet breathing.       Vascular:  Pedal pulses are palpable bilaterally for both the DP and PT arteries.  CFT < 3 sec.  No edema.  Pedal hair growth noted.     Neuro:  Alert and oriented x 3. Coordinated gait.  Light touch sensation is intact to the L4, L5, S1 distributions. No obvious deficits.  No evidence of neurological-based weakness, spasticity, or contracture in the lower extremities.     Derm: Normal texture and turgor.  No erythema, ecchymosis, or cyanosis.  No open lesions.     Musculoskeletal:    Lower extremity muscle strength is normal.  Patient is ambulatory without an assistive device or brace.     Cavus foot with weightbearing bilateral.  No forefoot or rear foot deformities noted.  MS 5/5 all compartments.  Normal ROM all fore foot and rearfoot joints with no pain or crepitus.  No equinus.  Pain left foot over peroneal tendon course lateral calcaneus and lateral malleoli.  No subluxation.  Slight edema.  No masses.  No ecchymosis.  More pain stress ing PL vs PB.      Radiographic Exam:  X-Ray Findings:  I personally reviewed the films, normal    A:  Cavus foot with peroneal tendonitis    Plan:   Discussed etiology and treatment options in detail w/ the pt.  She is slightly better.  Explained how more stairs and moving work this tendon more.  Ice bid.  We gave her ace bandage.  Cam walker bothering back.  Will give her ankle brace when she needs rest from cam walker.  Will try to wear shoe same height while wearing cam walker.  Return to clinic 2 weeks if not better.  discussed if foot  not better may need mri to evaluate for tendon tear.          Narayan Alejandra DPM, FACFAS         Again, thank you for allowing me to participate in the care of your patient.        Sincerely,        Narayan Alejandra DPM

## 2017-11-29 NOTE — PATIENT INSTRUCTIONS
We wish you continued good healing. If you have any questions or concerns, please do not hesitate to contact us at [phone]      Please remember to call and schedule a follow up appointment if one was recommended at your earliest convenience.   PODIATRY CLINIC HOURS  TELEPHONE NUMBER    Dr. Narayan Alejandra D.P.[facility]    Clinics:  [facility]        Jennifer Astudillo MA  Medical Assistant  Tuesday 1PM-6PM  [facility]  Wednesday 7AM-2PM  [place]  Thursday 10AM-6PM  [place] Friday 7AM-345PM  [place]  Specialty schedulers:   [phone] to make an appointment with any Specialty Provider.        Urgent Care locations:    [facility] Monday-Friday 5 pm - 9 pm. Saturday-Sunday 9 am -5pm    Monday-Friday 11 am - 9 pm Saturday 9 am - 5 pm     Monday-Sunday 12 noon-8PM [phone]     If you need a medication refill, please contact us you may need lab work and/or a follow up visit prior to your refill (i.e. Antifungal medications).    [employer] (secure e-mail communication and access to your chart) to send a message or to make an appointment.    If MRI needed please call Emeka Roman at [phone]        Weight management plan: Patient was referred to their PCP to discuss a diet and exercise plan.

## 2017-11-29 NOTE — PROGRESS NOTES
Subjective:    11/15/17  Pt is seen today in consult from Dr. Terrell with the c/c of painful left foot.  This has been symptomatic for the past 3 months.  Pt denies any hx of trauma.  Aggravated by activity and releaved by rest.  Describes as burning pain.  Is slowly getting worse.  Feels better wearing good shoes.  Point to peroneal tendon course lateral calcaneus as to wear it hurts.  Denies erythema, ecchymosis, snapping, weakness.  She just moved here from [place] and on her feet a lot.  In house for a month with lots of stairs and this made it worse.  Had injection in peroneal tendons in [place] and this helped for a while.      11/29/17  Patient has been wearing cam walker.  Foot slightly better, but still pain lateral left ankle         ROS:  Denies  popping, erythema, weakness.         Allergies   Allergen Reactions     Aleve [Naproxen]      Asa [Aspirin]      Codeine      Doxycycline      Elavil [Amitriptyline]      Etodolac      Gabapentin      Lyrica [Pregabalin]      Mushroom      Oxycodone      Oxycodone-Acetaminophen      Vicodin [Hydrocodone-Acetaminophen]        Current Outpatient Prescriptions   Medication Sig Dispense Refill     busPIRone (BUSPAR) 10 MG tablet Take 10 mg by mouth 3 times daily       rosuvastatin (CRESTOR) 5 MG tablet Take 5 mg by mouth daily       cetirizine (ZYRTEC) 5 MG tablet Take 5 mg by mouth daily       pantoprazole (PROTONIX) 40 MG EC tablet Take 40 mg by mouth daily       loratadine (CLARITIN) 10 MG tablet Take 10 mg by mouth daily       potassium chloride (KLOR-CON) 20 MEQ Packet Take 20 mEq by mouth daily       verapamil HCl  MG CP24 Take 1 capsule by mouth daily       citalopram (CELEXA) 20 MG tablet Take 20 mg by mouth daily       clopidogrel (PLAVIX) 75 MG tablet Take 75 mg by mouth daily       lisinopril-hydrochlorothiazide (PRINZIDE/ZESTORETIC) 20-25 MG per tablet Take 1 tablet by mouth daily         Patient Active Problem List   Diagnosis     Advanced directives,  counseling/discussion     Hypertension goal BP (blood pressure) < 150/90     Neurocardiogenic syncope     Mixed hyperlipidemia     MANNY (obstructive sleep apnea)     Nonrheumatic aortic valve insufficiency     Anxiety     Gastroesophageal reflux disease without esophagitis     TIA (transient ischemic attack)       Past Medical History:   Diagnosis Date     Anxiety      Aortic regurgitation      GERD (gastroesophageal reflux disease)      HTN (hypertension)      Neurocardiogenic syncope      MANNY (obstructive sleep apnea)      TIA (transient ischemic attack)     occiptal       Past Surgical History:   Procedure Laterality Date     APPENDECTOMY OPEN CHILD  1951     C TOTAL KNEE ARTHROPLASTY Left 2011     C TOTAL KNEE ARTHROPLASTY Left 2013    revision     CARPAL TUNNEL RELEASE RT/LT       CATARACT IOL, RT/LT  2009    right 2/4/09, left 2/21/09     CHOLECYSTECTOMY  06/2012     COLONOSCOPY  2012    normal     MOHS MICROGRAPHIC PROCEDURE  2011    back - 2011, lip and back 5/2012     ROTATOR CUFF REPAIR RT/LT Right 2009     ROTATOR CUFF REPAIR RT/LT Left 04/20/2016     TONSILLECTOMY         Family History   Problem Relation Age of Onset     Tuberculosis Mother      Myocardial Infarction Father      Myocardial Infarction Brother      Heart Surgery Daughter        Social History   Substance Use Topics     Smoking status: Never Smoker     Smokeless tobacco: Never Used     Alcohol use No             O:  /68 (BP Location: Right arm, Patient Position: Sitting, Cuff Size: Adult Regular)  Pulse 99  Wt 156 lb (70.8 kg)  BMI 30.98 kg/m2.     Constitutional/ general:  Pt is in no apparent distress, appears well-nourished.  Cooperative with history and physical exam.     Psych:  The patient answered questions appropriately.  Normal affect.  Seems to have reasonable expectations, in terms of treatment.     Eyes:  Visual scanning/ tracking without deficit.     Ears:  Response to auditory stimuli is normal.  No  hearing aid  devices.  Auricles in proper alignment.     Lymphatic:  Popliteal lymph nodes not enlarged.     Lungs:  Non labored breathing, non labored speech. No cough.  No audible wheezing. Even, quiet breathing.       Vascular:  Pedal pulses are palpable bilaterally for both the DP and PT arteries.  CFT < 3 sec.  No edema.  Pedal hair growth noted.     Neuro:  Alert and oriented x 3. Coordinated gait.  Light touch sensation is intact to the L4, L5, S1 distributions. No obvious deficits.  No evidence of neurological-based weakness, spasticity, or contracture in the lower extremities.     Derm: Normal texture and turgor.  No erythema, ecchymosis, or cyanosis.  No open lesions.     Musculoskeletal:    Lower extremity muscle strength is normal.  Patient is ambulatory without an assistive device or brace.     Cavus foot with weightbearing bilateral.  No forefoot or rear foot deformities noted.  MS 5/5 all compartments.  Normal ROM all fore foot and rearfoot joints with no pain or crepitus.  No equinus.  Pain left foot over peroneal tendon course lateral calcaneus and lateral malleoli.  No subluxation.  Slight edema.  No masses.  No ecchymosis.  More pain stress ing PL vs PB.      Radiographic Exam:  X-Ray Findings:  I personally reviewed the films, normal    A:  Cavus foot with peroneal tendonitis    Plan:   Discussed etiology and treatment options in detail w/ the pt.  She is slightly better.  Explained how more stairs and moving work this tendon more.  Ice bid.  We gave her ace bandage.  Cam walker bothering back.  Will give her ankle brace when she needs rest from cam walker.  Will try to wear shoe same height while wearing cam walker.  Return to clinic 2 weeks if not better.  discussed if foot not better may need mri to evaluate for tendon tear.          Narayan Alejandra DPM, FACFAS

## 2017-11-29 NOTE — NURSING NOTE
"Chief Complaint   Patient presents with     Follow Up For     Foot Pain     Tendonitis     Fracture     Left foot        Initial /68 (BP Location: Right arm, Patient Position: Sitting, Cuff Size: Adult Regular)  Pulse 99  Wt 156 lb (70.8 kg)  BMI 30.98 kg/m2 Estimated body mass index is 30.98 kg/(m^2) as calculated from the following:    Height as of 11/14/17: 4' 11.5\" (1.511 m).    Weight as of this encounter: 156 lb (70.8 kg).  Medication Reconciliation: complete  "

## 2017-11-29 NOTE — MR AVS SNAPSHOT
After Visit Summary   11/29/2017    Swati Marinelli    MRN: [id number]           Patient Information     Date Of Birth          1940        Visit Information        Provider Department      11/29/2017 9:15 AM Narayan Alejandra DPM [facility]        Care Instructions    We wish you continued good healing. If you have any questions or concerns, please do not hesitate to contact us at [phone]      Please remember to call and schedule a follow up appointment if one was recommended at your earliest convenience.   PODIATRY CLINIC HOURS  TELEPHONE NUMBER    Dr. Narayan Alejandra D.P.M FAC FAS    Clinics:  [facility]        Jennifer Astudillo MA  Medical Assistant  Tuesday 1PM-6PM  [facility]  Wednesday 7AM-2PM  [place]  Thursday 10AM-6PM  [place] Friday 7AM-345PM  [place]  Specialty schedulers:   [phone] to make an appointment with any Specialty Provider.        Urgent Care locations:    [facility] Monday-Friday 5 pm - 9 pm. Saturday-Sunday 9 am -5pm    Monday-Friday 11 am - 9 pm Saturday 9 am - 5 pm     Monday-Sunday 12 noon-8PM [phone]     If you need a medication refill, please contact us you may need lab work and/or a follow up visit prior to your refill (i.e. Antifungal medications).    [employer] (secure e-mail communication and access to your chart) to send a message or to make an appointment.    If MRI needed please call Emeka Roman at [phone]        Weight management plan: Patient was referred to their PCP to discuss a diet and exercise plan.            Follow-ups after your visit        Your next 10 appointments already scheduled     Nov 29, 2017  9:15 AM CST   Return Visit with Narayan Alejandra DPM   [facility] ([facility])    [address] [facility] [address] [phone]             "Jan 02, 2018  9:00 AM CST   LAB with [facility] LAB   [facility]    [address] [facility] [address] [phone]           Please do not eat 10-12 hours before your appointment if you are coming in fasting for labs on lipids, cholesterol, or glucose (sugar). This does not apply to pregnant women. Water, hot tea and black coffee (with nothing added) are okay. Do not drink other fluids, diet soda or chew gum.            Jan 09, 2018 10:15 AM CST   [facility] Visit with [facility]rell MD   [facility]    [address] [facility] [address] [phone]           Bring a current list of meds and any records pertaining to this visit. For Physicals, please bring immunization records and any forms needing to be filled out. Please arrive 10 minutes early to complete paperwork.              Who to contact     If you have questions or need follow up information about today's clinic visit or your schedule please contact [facility] directly at [phone].  Normal or non-critical lab and imaging results will be communicated to you by FAST FELThart, letter or phone within 4 business days after the clinic has received the results. If you do not hear from us within 7 days, please contact the clinic through ActualSunt or phone. If you have a critical or abnormal lab result, we will notify you by phone as soon as possible.  Submit refill requests through Hotchalk or call your pharmacy and they will forward the refill request to us. Please allow 3 business days for your refill to be completed.          Additional Information About Your Visit        FAST FELThart Information     Hotchalk lets you send messages to your doctor, view your test results, renew your prescriptions, schedule appointments and more. To sign up, go to [email or web address] . Click on \"Log in\" on the left side of the screen, which will take you to the Welcome page. " "Then click on \"Sign up Now\" on the right side of the page.     You will be asked to enter the access code listed below, as well as some personal information. Please follow the directions to create your username and password.     Your access code is: [email or web address]  Expires: 2018  9:11 AM     Your access code will  in 90 days. If you need help or a new code, please call your [facility] clinic or [phone].        Care EveryWhere ID     This is your Care EveryWhere ID. This could be used by other organizations to access your [facility] medical records  [id number]        Your Vitals Were     Pulse BMI (Body Mass Index)                99 30.98 kg/m2           Blood Pressure from Last 3 Encounters:   17 115/68   17 133/72    Weight from Last 3 Encounters:   17 156 lb (70.8 kg)   11/15/17 156 lb (70.8 kg)   17 156 lb 9.6 oz (71 kg)              Today, you had the following     No orders found for display       Primary Care Provider Office Phone # Fax #    Deborah Terrell [phone] [address] [facility] [address]        Equal Access to Services     SHALINI BALDWIN : Hadii aad ku hadasho Soomaali, waaxda luqadaha, qaybta kaalmada adeegyada, rishi handn tony shaffer . So [facility] [phone].    ATENCIÓN: Si habla español, tiene a apodaca disposición servicios gratuitos de asistencia lingüística. Llame al [phone].    We comply with applicable federal civil rights laws and [place] laws. We do not discriminate on the basis of race, color, national origin, age, disability, sex, sexual orientation, or gender identity.            Thank you!     Thank you for choosing [facility]  for your care. Our goal is always to provide you with excellent care. Hearing back from our patients is one way we can continue to improve our services. Please take a few minutes to complete the written survey that you may receive in the mail after your visit " with us. Thank you!             Your Updated Medication List - Protect others around you: Learn how to safely use, store and throw away your medicines at www.disposemymeds.org.          This list is accurate as of: 11/29/17  9:10 AM.  Always use your most recent med list.                   Brand Name Dispense Instructions for use Diagnosis    busPIRone 10 MG tablet    BUSPAR     Take 10 mg by mouth 3 times daily        celeXA 20 MG tablet   Generic drug:  citalopram      Take 20 mg by mouth daily        cetirizine 5 MG tablet    zyrTEC     Take 5 mg by mouth daily        CRESTOR 5 MG tablet   Generic drug:  rosuvastatin      Take 5 mg by mouth daily        lisinopril-hydrochlorothiazide 20-25 MG per tablet    PRINZIDE/ZESTORETIC     Take 1 tablet by mouth daily        loratadine 10 MG tablet    CLARITIN     Take 10 mg by mouth daily        PLAVIX 75 MG tablet   Generic drug:  clopidogrel      Take 75 mg by mouth daily        potassium chloride 20 MEQ Packet    KLOR-CON     Take 20 mEq by mouth daily        PROTONIX 40 MG EC tablet   Generic drug:  pantoprazole      Take 40 mg by mouth daily        verapamil HCl  MG Cp24      Take 1 capsule by mouth daily

## 2017-12-12 DIAGNOSIS — G45.9 TIA (TRANSIENT ISCHEMIC ATTACK): ICD-10-CM

## 2017-12-12 DIAGNOSIS — F41.9 ANXIETY: ICD-10-CM

## 2017-12-12 DIAGNOSIS — E78.2 MIXED HYPERLIPIDEMIA: ICD-10-CM

## 2017-12-12 DIAGNOSIS — I10 HYPERTENSION GOAL BP (BLOOD PRESSURE) < 150/90: Primary | ICD-10-CM

## 2017-12-13 RX ORDER — LISINOPRIL AND HYDROCHLOROTHIAZIDE 20; 25 MG/1; MG/1
1 TABLET ORAL DAILY
Qty: 30 TABLET | Refills: 5 | Status: SHIPPED | OUTPATIENT
Start: 2017-12-13 | End: 2018-01-09

## 2017-12-13 RX ORDER — VERAPAMIL HYDROCHLORIDE 360 MG/1
1 CAPSULE, DELAYED RELEASE PELLETS ORAL DAILY
Qty: 30 CAPSULE | Refills: 5 | Status: SHIPPED | OUTPATIENT
Start: 2017-12-13 | End: 2018-01-09

## 2017-12-13 RX ORDER — POTASSIUM CHLORIDE 1.5 G/1.58G
20 POWDER, FOR SOLUTION ORAL DAILY
Qty: 30 TABLET | Refills: 5 | Status: SHIPPED | OUTPATIENT
Start: 2017-12-13 | End: 2018-01-09

## 2017-12-13 RX ORDER — CLOPIDOGREL BISULFATE 75 MG/1
75 TABLET ORAL DAILY
Qty: 30 TABLET | Refills: 5 | Status: SHIPPED | OUTPATIENT
Start: 2017-12-13 | End: 2018-01-09

## 2017-12-13 RX ORDER — ROSUVASTATIN CALCIUM 5 MG/1
5 TABLET, COATED ORAL DAILY
Qty: 30 TABLET | Refills: 5 | Status: SHIPPED | OUTPATIENT
Start: 2017-12-13 | End: 2018-01-09

## 2017-12-13 RX ORDER — CITALOPRAM HYDROBROMIDE 20 MG/1
20 TABLET ORAL DAILY
Qty: 30 TABLET | Refills: 5 | Status: SHIPPED | OUTPATIENT
Start: 2017-12-13 | End: 2018-01-09

## 2017-12-13 NOTE — TELEPHONE ENCOUNTER
Prescription approved per [facility] Refill Protocol and review of last OV note.     Pretty Brody RN

## 2017-12-27 ENCOUNTER — DOCUMENTATION ONLY (OUTPATIENT)
Dept: LAB | Facility: CLINIC | Age: 77
End: 2017-12-27

## 2017-12-27 DIAGNOSIS — I10 HYPERTENSION GOAL BP (BLOOD PRESSURE) < 150/90: Primary | ICD-10-CM

## 2017-12-27 DIAGNOSIS — Z13.6 SCREENING FOR CARDIOVASCULAR CONDITION: ICD-10-CM

## 2017-12-27 DIAGNOSIS — G45.3 AMAUROSIS FUGAX: ICD-10-CM

## 2017-12-27 NOTE — PROGRESS NOTES
This patient is scheduled for lab work on 1/2/2018 but does not qualify for pre-visit protocol. Please place future orders, or have your care team call and advise patient to cancel lab appointment. She has an appointment with Dr. Reyes 1/9/2018.    Thank you,    [facility]

## 2017-12-28 DIAGNOSIS — I10 HYPERTENSION GOAL BP (BLOOD PRESSURE) < 150/90: Primary | ICD-10-CM

## 2017-12-28 DIAGNOSIS — G45.9 TIA (TRANSIENT ISCHEMIC ATTACK): ICD-10-CM

## 2017-12-28 NOTE — PROGRESS NOTES
Please review and sign Pending Pre-visit Labs 01/02/2018, OV BP anxiety f/up .in Epic. Ivone CHACON

## 2017-12-29 ENCOUNTER — ALLIED HEALTH/NURSE VISIT (OUTPATIENT)
Dept: NURSING | Facility: CLINIC | Age: 77
End: 2017-12-29
Payer: MEDICARE

## 2017-12-29 DIAGNOSIS — H61.23 BILATERAL IMPACTED CERUMEN: Primary | ICD-10-CM

## 2017-12-29 PROCEDURE — 99207 ZZC NO CHARGE NURSE ONLY: CPT

## 2017-12-29 PROCEDURE — 69209 REMOVE IMPACTED EAR WAX UNI: CPT | Mod: 50

## 2017-12-29 NOTE — NURSING NOTE
With otoscope, I visualized cerumen in both ear(s).  MA performed bilateral ear wash and I re-examined ear(s).  Both eardrum(s) were visualized and free of cerumen.  Patient tolerated procedure well and was discharged.    Joanne Kirby RN

## 2017-12-29 NOTE — MR AVS SNAPSHOT
After Visit Summary   12/29/2017    Swati Marinelli    MRN: [id number]           Patient Information     Date Of Birth          1940        Visit Information        Provider Department      12/29/2017 12:00 PM AN ANCILLARY [facility]        Today's Diagnoses     Bilateral impacted cerumen    -  1       Follow-ups after your visit        Your next 10 appointments already scheduled     Jan 02, 2018  9:00 AM CST   LAB with AN LAB   [facility] ([facility])    [address] [facility] [address] [phone]           Please do not eat 10-12 hours before your appointment if you are coming in fasting for labs on lipids, cholesterol, or glucose (sugar). This does not apply to pregnant women. Water, hot tea and black coffee (with nothing added) are okay. Do not drink other fluids, diet soda or chew gum.            Jan 09, 2018 10:15 AM CST   Office Visit with Deborah Terrell MD   [facility] ([facility])    [address] [facility] [id number] [phone]           Bring a current list of meds and any records pertaining to this visit. For Physicals, please bring immunization records and any forms needing to be filled out. Please arrive 10 minutes early to complete paperwork.              Who to contact     If you have questions or need follow up information about today's clinic visit or your schedule please contact [facility] directly at [phone].  Normal or non-critical lab and imaging results will be communicated to you by MyChart, letter or phone within 4 business days after the clinic has received the results. If you do not hear from us within 7 days, please contact the clinic through MyChart or phone. If you have a critical or abnormal lab result, we will notify you by phone as soon as possible.  Submit refill requests through [employer] With Fetch or call your pharmacy and they will forward the refill request  "to us. Please allow 3 business days for your refill to be completed.          Additional Information About Your Visit        MyChart Information     [employer] lets you send messages to your doctor, view your test results, renew your prescriptions, schedule appointments and more. To sign up, go to [email or web address] . Click on \"Log in\" on the left side of the screen, which will take you to the Welcome page. Then click on \"Sign up Now\" on the right side of the page.     You will be asked to enter the access code listed below, as well as some personal information. Please follow the directions to create your username and password.     Your access code is: [email or web address]  Expires: 2018  9:11 AM     Your access code will  in 90 days. If you need help or a new code, please call your [place] clinic or [phone].        Care EveryWhere ID     This is your Care EveryWhere ID. This could be used by other organizations to access your [place] medical records  [id number]         Blood Pressure from Last 3 Encounters:   17 115/68   17 133/72    Weight from Last 3 Encounters:   17 156 lb (70.8 kg)   11/15/17 156 lb (70.8 kg)   17 156 lb 9.6 oz (71 kg)              We Performed the Following     HC REMOVAL IMPACTED CERUMEN IRRIGATION/LVG UNILAT        Primary Care Provider Office Phone # Fax #    Deborah Gisela Terrell [phone] [address] [facility] [address]        Equal Access to Services     [facility] Center[facility]RELLA : Hadii aad ku hadasho Soomaali, waaxda luqadaha, qaybta kaalmada mariel, rishi shaffer . So [facility] [phone].    ATENCIÓN: Si habla español, tiene a apodaca disposición servicios gratuitos de asistencia lingüística. Llame al [phone].    We comply with applicable federal civil rights laws and [place] laws. We do not discriminate on the basis of race, color, national origin, age, disability, sex, sexual orientation, or gender " identity.            Thank you!     Thank you for choosing [facility]  for your care. Our goal is always to provide you with excellent care. Hearing back from our patients is one way we can continue to improve our services. Please take a few minutes to complete the written survey that you may receive in the mail after your visit with us. Thank you!             Your Updated Medication List - Protect others around you: Learn how to safely use, store and throw away your medicines at www.disposemymeds.org.          This list is accurate as of: 12/29/17 12:32 PM.  Always use your most recent med list.                   Brand Name Dispense Instructions for use Diagnosis    busPIRone 10 MG tablet    BUSPAR     Take 10 mg by mouth 3 times daily        cetirizine 5 MG tablet    zyrTEC     Take 5 mg by mouth daily        citalopram 20 MG tablet    celeXA    30 tablet    Take 1 tablet (20 mg) by mouth daily    Anxiety       clopidogrel 75 MG tablet    PLAVIX    30 tablet    Take 1 tablet (75 mg) by mouth daily    TIA (transient ischemic attack)       lisinopril-hydrochlorothiazide 20-25 MG per tablet    PRINZIDE/ZESTORETIC    30 tablet    Take 1 tablet by mouth daily    Hypertension goal BP (blood pressure) < 150/90       loratadine 10 MG tablet    CLARITIN     Take 10 mg by mouth daily        potassium chloride 20 MEQ Packet    KLOR-CON    30 tablet    Take 20 mEq by mouth daily    Hypertension goal BP (blood pressure) < 150/90       PROTONIX 40 MG EC tablet   Generic drug:  pantoprazole      Take 40 mg by mouth daily        rosuvastatin 5 MG tablet    CRESTOR    30 tablet    Take 1 tablet (5 mg) by mouth daily    Mixed hyperlipidemia       verapamil HCl  MG Cp24     30 capsule    Take 1 capsule by mouth daily    Hypertension goal BP (blood pressure) < 150/90

## 2018-01-03 DIAGNOSIS — G45.3 AMAUROSIS FUGAX: ICD-10-CM

## 2018-01-03 DIAGNOSIS — I10 HYPERTENSION GOAL BP (BLOOD PRESSURE) < 150/90: ICD-10-CM

## 2018-01-03 LAB
ANION GAP SERPL CALCULATED.3IONS-SCNC: 11 MMOL/L (ref 3–14)
BUN SERPL-MCNC: 19 MG/DL (ref 7–30)
CALCIUM SERPL-MCNC: 9.4 MG/DL (ref 8.5–10.1)
CHLORIDE SERPL-SCNC: 102 MMOL/L (ref 94–109)
CHOLEST SERPL-MCNC: 164 MG/DL
CO2 SERPL-SCNC: 28 MMOL/L (ref 20–32)
CREAT SERPL-MCNC: 0.91 MG/DL (ref 0.52–1.04)
GFR SERPL CREATININE-BSD FRML MDRD: 60 ML/MIN/1.7M2
GLUCOSE SERPL-MCNC: 88 MG/DL (ref 70–99)
HDLC SERPL-MCNC: 37 MG/DL
LDLC SERPL CALC-MCNC: 98 MG/DL
NONHDLC SERPL-MCNC: 127 MG/DL
POTASSIUM SERPL-SCNC: 3.6 MMOL/L (ref 3.4–5.3)
SODIUM SERPL-SCNC: 141 MMOL/L (ref 133–144)
TRIGL SERPL-MCNC: 145 MG/DL

## 2018-01-03 PROCEDURE — 80048 BASIC METABOLIC PNL TOTAL CA: CPT | Performed by: FAMILY MEDICINE

## 2018-01-03 PROCEDURE — 80061 LIPID PANEL: CPT | Performed by: FAMILY MEDICINE

## 2018-01-03 PROCEDURE — 36415 COLL VENOUS BLD VENIPUNCTURE: CPT | Performed by: FAMILY MEDICINE

## 2018-01-09 ENCOUNTER — TELEPHONE (OUTPATIENT)
Dept: FAMILY MEDICINE | Facility: CLINIC | Age: 78
End: 2018-01-09

## 2018-01-09 DIAGNOSIS — G45.9 TIA (TRANSIENT ISCHEMIC ATTACK): ICD-10-CM

## 2018-01-09 DIAGNOSIS — E78.2 MIXED HYPERLIPIDEMIA: ICD-10-CM

## 2018-01-09 DIAGNOSIS — I10 HYPERTENSION GOAL BP (BLOOD PRESSURE) < 150/90: ICD-10-CM

## 2018-01-09 DIAGNOSIS — F41.9 ANXIETY: ICD-10-CM

## 2018-01-09 RX ORDER — POTASSIUM CHLORIDE 1.5 G/1.58G
20 POWDER, FOR SOLUTION ORAL DAILY
Qty: 90 TABLET | Refills: 0 | Status: SHIPPED | OUTPATIENT
Start: 2018-01-09 | End: 2018-01-23

## 2018-01-09 RX ORDER — LISINOPRIL AND HYDROCHLOROTHIAZIDE 20; 25 MG/1; MG/1
1 TABLET ORAL DAILY
Qty: 90 TABLET | Refills: 0 | Status: SHIPPED | OUTPATIENT
Start: 2018-01-09 | End: 2018-01-23

## 2018-01-09 RX ORDER — POTASSIUM CHLORIDE 1.5 G/1.58G
20 POWDER, FOR SOLUTION ORAL DAILY
Qty: 90 TABLET | Refills: 0 | Status: CANCELLED | OUTPATIENT
Start: 2018-01-09

## 2018-01-09 RX ORDER — VERAPAMIL HYDROCHLORIDE 360 MG/1
1 CAPSULE, DELAYED RELEASE PELLETS ORAL DAILY
Qty: 90 CAPSULE | Refills: 0 | Status: SHIPPED | OUTPATIENT
Start: 2018-01-09 | End: 2018-01-23

## 2018-01-09 RX ORDER — CITALOPRAM HYDROBROMIDE 20 MG/1
20 TABLET ORAL DAILY
Qty: 90 TABLET | Refills: 0 | Status: SHIPPED | OUTPATIENT
Start: 2018-01-09 | End: 2018-01-23

## 2018-01-09 RX ORDER — CLOPIDOGREL BISULFATE 75 MG/1
75 TABLET ORAL DAILY
Qty: 90 TABLET | Refills: 0 | Status: SHIPPED | OUTPATIENT
Start: 2018-01-09 | End: 2018-01-23

## 2018-01-09 RX ORDER — ROSUVASTATIN CALCIUM 5 MG/1
5 TABLET, COATED ORAL DAILY
Qty: 90 TABLET | Refills: 0 | Status: SHIPPED | OUTPATIENT
Start: 2018-01-09 | End: 2018-01-23

## 2018-01-09 NOTE — TELEPHONE ENCOUNTER
Per office visit 11/14/17, patient is due for 6 mos follow up in May, 2018.  Okay to refill until that time.  Medication refilled per RN protocol.  Patient informed by  not due for an appt until May.   Jada Humphrey RN

## 2018-01-09 NOTE — TELEPHONE ENCOUNTER
Reason for Call:  Medication or medication refill:    Do you use a [facility]?  Name of the pharmacy and phone number for the current request:  [facility] mails it to pt     Name of the medication requested: ROSUVASTATIN, POTASSIUM CHLORIDE    Other request: pt only has one week left of the prescriptions. Any questions contact pt. [facility] card is on file    Can we leave a detailed message on this number? YES    Phone number patient can be reached at: [phone]    Best Time: any time    Call taken on 1/9/2018 at 9:55 AM by Ming Brandon

## 2018-01-09 NOTE — TELEPHONE ENCOUNTER
Patient came back wanting to know the status of the prescription refills. Please call and advise. [phone] Pt says she needs the medication right away and if you could send the rx to   [facility] - [place], [place] - [address] [facility]

## 2018-01-09 NOTE — TELEPHONE ENCOUNTER
Fax from [facility]: Need for clarification on Klor-Con 20MEQ    Sig states : dissolve and take one packet by mouth once daily    Notes to prescriber: did you want pt to have tablets or packets to mix in water?      Vane Wren MA

## 2018-01-09 NOTE — TELEPHONE ENCOUNTER
See message below, This patient was Bumped from 01/09/18 JMR schedule please refill medication.  INES Meza/Martha

## 2018-01-23 ENCOUNTER — OFFICE VISIT (OUTPATIENT)
Dept: FAMILY MEDICINE | Facility: CLINIC | Age: 78
End: 2018-01-23
Payer: MEDICARE

## 2018-01-23 VITALS
OXYGEN SATURATION: 93 % | BODY MASS INDEX: 30.23 KG/M2 | HEART RATE: 87 BPM | WEIGHT: 154 LBS | HEIGHT: 60 IN | TEMPERATURE: 97 F | SYSTOLIC BLOOD PRESSURE: 122 MMHG | DIASTOLIC BLOOD PRESSURE: 72 MMHG

## 2018-01-23 DIAGNOSIS — E78.2 MIXED HYPERLIPIDEMIA: ICD-10-CM

## 2018-01-23 DIAGNOSIS — G45.9 TRANSIENT CEREBRAL ISCHEMIA, UNSPECIFIED TYPE: ICD-10-CM

## 2018-01-23 DIAGNOSIS — I10 HYPERTENSION GOAL BP (BLOOD PRESSURE) < 150/90: ICD-10-CM

## 2018-01-23 DIAGNOSIS — F41.9 ANXIETY: Primary | ICD-10-CM

## 2018-01-23 PROCEDURE — 99214 OFFICE O/P EST MOD 30 MIN: CPT | Performed by: FAMILY MEDICINE

## 2018-01-23 RX ORDER — ROSUVASTATIN CALCIUM 5 MG/1
5 TABLET, COATED ORAL DAILY
Qty: 90 TABLET | Refills: 0 | Status: SHIPPED | OUTPATIENT
Start: 2018-01-23 | End: 2018-04-17

## 2018-01-23 RX ORDER — POTASSIUM CHLORIDE 1.5 G/1.58G
20 POWDER, FOR SOLUTION ORAL DAILY
Qty: 90 TABLET | Refills: 0 | Status: SHIPPED | OUTPATIENT
Start: 2018-01-23 | End: 2018-02-06

## 2018-01-23 RX ORDER — VERAPAMIL HYDROCHLORIDE 360 MG/1
1 CAPSULE, DELAYED RELEASE PELLETS ORAL DAILY
Qty: 90 CAPSULE | Refills: 0 | Status: SHIPPED | OUTPATIENT
Start: 2018-01-23 | End: 2018-04-17

## 2018-01-23 RX ORDER — CLOPIDOGREL BISULFATE 75 MG/1
75 TABLET ORAL DAILY
Qty: 90 TABLET | Refills: 0 | Status: SHIPPED | OUTPATIENT
Start: 2018-01-23 | End: 2018-04-17

## 2018-01-23 RX ORDER — CITALOPRAM HYDROBROMIDE 20 MG/1
20 TABLET ORAL DAILY
Qty: 90 TABLET | Refills: 0 | Status: SHIPPED | OUTPATIENT
Start: 2018-01-23 | End: 2018-04-17

## 2018-01-23 RX ORDER — ALPRAZOLAM 0.25 MG
.25-.5 TABLET ORAL
Qty: 30 TABLET | Refills: 0 | Status: SHIPPED | OUTPATIENT
Start: 2018-01-23 | End: 2018-07-11

## 2018-01-23 RX ORDER — LISINOPRIL AND HYDROCHLOROTHIAZIDE 20; 25 MG/1; MG/1
1 TABLET ORAL DAILY
Qty: 90 TABLET | Refills: 0 | Status: SHIPPED | OUTPATIENT
Start: 2018-01-23 | End: 2018-04-17

## 2018-01-23 RX ORDER — POTASSIUM CHLORIDE 1.5 G/1.58G
20 POWDER, FOR SOLUTION ORAL DAILY
Qty: 90 TABLET | Refills: 0 | Status: CANCELLED | OUTPATIENT
Start: 2018-01-23

## 2018-01-23 NOTE — NURSING NOTE
"Chief Complaint   Patient presents with     Hypertension     Anxiety       Initial /72  Pulse 87  Temp 97  F (36.1  C) (Oral)  Ht 4' 11.5\" (1.511 m)  Wt 154 lb (69.9 kg)  SpO2 93%  BMI 30.58 kg/m2 Estimated body mass index is 30.58 kg/(m^2) as calculated from the following:    Height as of this encounter: 4' 11.5\" (1.511 m).    Weight as of this encounter: 154 lb (69.9 kg).  Medication Reconciliation: complete    Vane Wren MA    "

## 2018-01-23 NOTE — PATIENT INSTRUCTIONS
Cut lisinopril/HCTZ in half and come back to our pharmacy for a blood pressure check after taking 1/2 tablet each day for 2 weeks.        Try xanax to help turn off your brain.  Be careful of sedation.

## 2018-01-23 NOTE — PROGRESS NOTES
SUBJECTIVE:   Swati Marinelli is a 77 year old female who presents to clinic today for the following health issues:      Hyperlipidemia Follow-Up      Rate your low fat/cholesterol diet?: good    Taking statin?  Yes, possible muscle aches from statin    Other lipid medications/supplements?:  none    Hypertension Follow-up      Outpatient blood pressures are being checked at home.  Results are 120's/70's.    Low Salt Diet: low salt    Hypertension ROS: taking medications as instructed, no medication side effects noted, no TIA's, no chest pain on exertion, no dyspnea on exertion, noting swelling of ankles, does note some dizziness when arising / and sometimes when standing and turning to do something - pt takes citalopram.  No headache or visual changes.     Anxiety Follow-Up    Status since last visit: Worsened - moved twice in two months     Other associated symptoms:None    Complicating factors:   Significant life event: Yes-  Moved twice in the last two months. Having to change everything with hill, insurance, doctors, etc.    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 Saw another dermatologist and they took another biopsy of it at [facility] in [place] - this morning.      Problem list and histories reviewed & adjusted, as indicated.  Additional history: as documented    Current Outpatient Prescriptions   Medication Sig Dispense Refill     citalopram (CELEXA) 20 MG tablet Take 1 tablet (20 mg) by mouth daily 90 tablet 0     lisinopril-hydrochlorothiazide (PRINZIDE/ZESTORETIC) 20-25 MG per tablet Take 1 tablet by mouth daily 90 tablet 0     potassium chloride (KLOR-CON) 20  "MEQ Packet Take 20 mEq by mouth daily 90 tablet 0     verapamil HCl  MG CP24 Take 1 capsule by mouth daily 90 capsule 0     rosuvastatin (CRESTOR) 5 MG tablet Take 1 tablet (5 mg) by mouth daily 90 tablet 0     clopidogrel (PLAVIX) 75 MG tablet Take 1 tablet (75 mg) by mouth daily 90 tablet 0     busPIRone (BUSPAR) 10 MG tablet Take 10 mg by mouth 3 times daily       cetirizine (ZYRTEC) 5 MG tablet Take 5 mg by mouth daily       pantoprazole (PROTONIX) 40 MG EC tablet Take 40 mg by mouth daily       loratadine (CLARITIN) 10 MG tablet Take 10 mg by mouth daily       Allergies   Allergen Reactions     Aleve [Naproxen]      Asa [Aspirin]      Codeine      Doxycycline      Elavil [Amitriptyline]      Etodolac      Gabapentin      Lyrica [Pregabalin]      Mushroom      Oxycodone      Oxycodone-Acetaminophen      Vicodin [Hydrocodone-Acetaminophen]      BP Readings from Last 3 Encounters:   01/23/18 122/72   11/29/17 115/68   11/14/17 133/72    Wt Readings from Last 3 Encounters:   01/23/18 154 lb (69.9 kg)   11/29/17 156 lb (70.8 kg)   11/15/17 156 lb (70.8 kg)           Reviewed and updated as needed this visit by clinical staff     Reviewed and updated as needed this visit by Provider       ROS:  C: NEGATIVE for fever, chills, change in weight  R: NEGATIVE for significant cough or SOB  CV: NEGATIVE for chest pain, palpitations or peripheral edema  MUSCULOSKELETAL: NEGATIVE for significant arthralgias or myalgia  NEURO: NEGATIVE for weakness or paresthesias and POSITIVE for dizziness/lightheadedness  PSYCHIATRIC: POSITIVE foranxiety and Hx anxiety    OBJECTIVE:     /72  Pulse 87  Temp 97  F (36.1  C) (Oral)  Ht 4' 11.5\" (1.511 m)  Wt 154 lb (69.9 kg)  SpO2 93%  BMI 30.58 kg/m2  Body mass index is 30.58 kg/(m^2).  GENERAL: healthy, alert and no distress  EYES: Eyes grossly normal to inspection, PERRL and conjunctivae and sclerae normal  NECK: no adenopathy, no asymmetry, masses, or scars and thyroid " normal to palpation  RESP: lungs clear to auscultation - no rales, rhonchi or wheezes  CV: regular rate and rhythm, normal S1 S2, no S3 or S4, no murmur, click or rub, no peripheral edema and peripheral pulses strong  MS: no gross musculoskeletal defects noted, no edema  SKIN: no suspicious lesions or rashes  PSYCH: mentation appears normal and anxious    Diagnostic Test Results:  none     ASSESSMENT/PLAN:     (F41.9) Anxiety  (primary encounter diagnosis)  Comment: Patient's anxiety has increased over the last 2 months due two recent life stressors. She has moved twice in the last 2 months and feels overwhelmed.  Plan: citalopram (CELEXA) 20 MG tablet, ALPRAZolam         (XANAX) 0.25 MG tablet        Patient was instructed to continue taking citalopram 20 mg daily and start taking 1 tablet of xanax at bedtime for 2 weeks to help with anxiety and improve sleep. Patient was told she can increase to 2 tablets if she feels 1 tablets is not helping enough. She was told to be cautious for possible sedation as a side effect of the xanax. Patient was infomred the plan is to not have her on the xanax long term, ideally only 1 month to help her get settled from her recent move.     (I10) Hypertension goal BP (blood pressure) < 150/90  Comment: Patient's blood pressure is at goal below 150/90 but she is experiencing more frequent dizziness with quick movements during the day. Her blood pressures are running in the low 120s.  Plan: lisinopril-hydrochlorothiazide         (PRINZIDE/ZESTORETIC) 20-25 MG per tablet,         potassium chloride (KLOR-CON) 20 MEQ Packet,         verapamil HCl  MG CP24        Patient was instructed to continue taking verapamil HCL  mg daily, potassium chloride 20 mg daily, and cut her lisinopril-HCTZ 20-25 mg tablet in half to take daily. She was told to come back in two weeks for a blood pressure check at the pharmacy. If her blood pressure is stable and her dizziness decreases on half a  tab of the lisinopril-HCTZ 20-25 mg then a new order for blood pressure medication will be ordered.     (E78.2) Mixed hyperlipidemia  Comment: Patient's lipid levels are stable and she is not experiencing any side effects on the medication.   Plan: rosuvastatin (CRESTOR) 5 MG tablet        Continue taking Crestor 5 mg tablet daily. Follow up in 6 months for annual physical.     (G45.9) Transient cerebral ischemia, unspecified type  Comment: Patient is stable and experiencing no side effects on the medication.  Plan: clopidogrel (PLAVIX) 75 MG tablet        Continue taking Plavix 75 mg daily. Follow up in 6 months for annual physical.       See Patient Instructions    Deborah Terrell MD  [facility]

## 2018-01-23 NOTE — MR AVS SNAPSHOT
"              After Visit Summary   1/23/2018    Swati Marinelli    MRN: [id number]           Patient Information     Date Of Birth          1940        Visit Information        Provider Department      1/23/2018 9:55 AM Deborah Terrell MD [facility]        Today's Diagnoses     Anxiety    -  1    Hypertension goal BP (blood pressure) < 150/90        Mixed hyperlipidemia        Transient cerebral ischemia, unspecified type          Care Instructions      Cut lisinopril/HCTZ in half and come back to our pharmacy for a blood pressure check after taking 1/2 tablet each day for 2 weeks.        Try xanax to help turn off your brain.  Be careful of sedation.                Follow-ups after your visit        Who to contact     If you have questions or need follow up information about today's clinic visit or your schedule please contact [facility] directly at [phone].  Normal or non-critical lab and imaging results will be communicated to you by [employer], letter or phone within 4 business days after the clinic has received the results. If you do not hear from us within 7 days, please contact the clinic through [employer] or phone. If you have a critical or abnormal lab result, we will notify you by phone as soon as possible.  Submit refill requests through [employer] or call your pharmacy and they will forward the refill request to us. Please allow 3 business days for your refill to be completed.          Additional Information About Your Visit        MyChart Information     [employer] lets you send messages to your doctor, view your test results, renew your prescriptions, schedule appointments and more. To sign up, go to [email or web address] . Click on \"Log in\" on the left side of the screen, which will take you to the Welcome page. Then click on \"Sign up Now\" on the right side of the page.     You will be asked to enter the access code listed below, as well as some personal information. " "Please follow the directions to create your username and password.     Your access code is: [email or web address]  Expires: 2018  9:11 AM     Your access code will  in 90 days. If you need help or a new code, please call your [place] clinic or [phone].        Care EveryWhere ID     This is your Care EveryWhere ID. This could be used by other organizations to access your [place] medical records  [id number]        Your Vitals Were     Pulse Temperature Height Pulse Oximetry BMI (Body Mass Index)       87 97  F (36.1  C) (Oral) 4' 11.5\" (1.511 m) 93% 30.58 kg/m2        Blood Pressure from Last 3 Encounters:   18 122/72   17 115/68   17 133/72    Weight from Last 3 Encounters:   18 154 lb (69.9 kg)   17 156 lb (70.8 kg)   11/15/17 156 lb (70.8 kg)              Today, you had the following     No orders found for display         Today's Medication Changes          These changes are accurate as of: 18 11:03 AM.  If you have any questions, ask your nurse or doctor.               Start taking these medicines.        Dose/Directions    ALPRAZolam 0.25 MG tablet   Commonly known as:  XANAX   Used for:  Anxiety   Started by:  Deborah Terrell MD        Dose:  0.25-0.5 mg   Take 1-2 tablets (0.25-0.5 mg) by mouth nightly as needed for sleep or anxiety   Quantity:  30 tablet   Refills:  0            Where to get your medicines      These medications were sent to [facility] - [place], [place] - [address]  [address], [facility] [address]     Phone:  [phone]     citalopram 20 MG tablet    clopidogrel 75 MG tablet    lisinopril-hydrochlorothiazide 20-25 MG per tablet    potassium chloride 20 MEQ Packet    rosuvastatin 5 MG tablet    verapamil HCl  MG Cp24         Some of these will need a paper prescription and others can be bought over the counter.  Ask your nurse if you have questions.     Bring a paper prescription for each of these " medications     ALPRAZolam 0.25 MG tablet                Primary Care Provider Office Phone # Fax #    Deborah Terrell [phone] [address] [facility] [address]        Equal Access to Services     SHALINI BALDWIN : Hadii juanita ku hadantonyo Soomaali, waaxda luqadaha, qaybta kaalmada adeegyada, rishi handn tony boyle laShiraluna grady. So [facility] [phone].    ATENCIÓN: Si habla español, tiene a apodaca disposición servicios gratuitos de asistencia lingüística. [facility] [phone].    We comply with applicable federal civil rights laws and [place] laws. We do not discriminate on the basis of race, color, national origin, age, disability, sex, sexual orientation, or gender identity.            Thank you!     Thank you for choosing [facility]  for your care. Our goal is always to provide you with excellent care. Hearing back from our patients is one way we can continue to improve our services. Please take a few minutes to complete the written survey that you may receive in the mail after your visit with us. Thank you!             Your Updated Medication List - Protect others around you: Learn how to safely use, store and throw away your medicines at www.disposemymeds.org.          This list is accurate as of: 1/23/18 11:03 AM.  Always use your most recent med list.                   Brand Name Dispense Instructions for use Diagnosis    ALPRAZolam 0.25 MG tablet    XANAX    30 tablet    Take 1-2 tablets (0.25-0.5 mg) by mouth nightly as needed for sleep or anxiety    Anxiety       cetirizine 5 MG tablet    zyrTEC     Take 5 mg by mouth daily        citalopram 20 MG tablet    celeXA    90 tablet    Take 1 tablet (20 mg) by mouth daily    Anxiety       clopidogrel 75 MG tablet    PLAVIX    90 tablet    Take 1 tablet (75 mg) by mouth daily    Transient cerebral ischemia, unspecified type       lisinopril-hydrochlorothiazide 20-25 MG per tablet    PRINZIDE/ZESTORETIC    90 tablet     Take 1 tablet by mouth daily    Hypertension goal BP (blood pressure) < 150/90       loratadine 10 MG tablet    CLARITIN     Take 10 mg by mouth daily        potassium chloride 20 MEQ Packet    KLOR-CON    90 tablet    Take 20 mEq by mouth daily    Hypertension goal BP (blood pressure) < 150/90       PROTONIX 40 MG EC tablet   Generic drug:  pantoprazole      Take 40 mg by mouth daily        rosuvastatin 5 MG tablet    CRESTOR    90 tablet    Take 1 tablet (5 mg) by mouth daily    Mixed hyperlipidemia       verapamil HCl  MG Cp24     90 capsule    Take 1 capsule by mouth daily    Hypertension goal BP (blood pressure) < 150/90

## 2018-02-01 ENCOUNTER — TELEPHONE (OUTPATIENT)
Dept: FAMILY MEDICINE | Facility: CLINIC | Age: 78
End: 2018-02-01

## 2018-02-01 DIAGNOSIS — J11.1 INFLUENZA-LIKE ILLNESS: Primary | ICD-10-CM

## 2018-02-01 RX ORDER — OSELTAMIVIR PHOSPHATE 75 MG/1
75 CAPSULE ORAL 2 TIMES DAILY
Qty: 10 CAPSULE | Refills: 0 | Status: SHIPPED | OUTPATIENT
Start: 2018-02-01 | End: 2018-04-19

## 2018-02-01 NOTE — TELEPHONE ENCOUNTER
Influenza-Like Illness (HERBERT) Protocol    Swati Rich      Age: 77 year old     YOB: 1940    Are you currently sick or have you had close contact with someone who is currently sick?   Yes, this patient is currently sick.     Adult Clinical Evaluation    Is this patient experiencing ANY of the following?  Unconsciousness or unresponsiveness No   Difficulty breathing or swallowing No   Blue or dusky lips, skin, or nail beds No   Chest pain No   Severe confusion or delirium No   Seizure activity: ongoing or stopped No   Severe dehydration or signs of shock No   Patient sounds very sick on the phone No     Is this patient experiencing ANY of the following?  Fever > 104 or shaking chills No   Wheezing with minimal response to usual wheezing medications or new wheezing No   Repeated vomiting or diarrhea with signs of dehydration (no urination within last 12 hours) No   Flu-like symptoms that initially improved but returned with fever and a worse cough No   Stiff or painful neck No   Severe headache No     Does the patient have any of the following?  Measured fever > 100 degrees unknown   Chills or feels very warm to the touch No   Cough Yes   Sore throat Yes   Muscle/ body aches Yes   Headaches Yes   Fatigue (tiredness) Yes     Nursing Plan      Below are conditions which place adults at increased risk for the more severe complications of influenza.    Does this patient have ANY of the following conditions?  Chronic pulmonary disease such as asthma or COPD No   Heart disease (CHF, CAD, anticoag due to arrhythmia) No   Liver disease (hepatitis, liver failure, cirrhosis) No   Kidney disease (renal failure, insufficiency or dialysis) No   Metabolic disorder (e.g. diabetes) No   Neuromuscular disorder (RENETTA TREJO MD, myasthenia gravis) No   Compromised ability to handle respiratory secretions No   Hematologic disorder (e.g. sickle cell disease) No   HIV / AIDS No   Chemotherapy or radiation within the last 3 months  No   Received an organ or a bone marrow transplant No   Taking Prednisone in excess of 20mg daily No   Is age 65 years or older Yes   Is pregnant, thinks she may be pregnant or is within two weeks after delivery No   Is a resident of a chronic care facility No   Is patient  or [place] native  No     Patient falls into high risk category.   HERBERT symptom onset less than 48 hours.    Allergies   Allergen Reactions     Aleve [Naproxen]      Asa [Aspirin]      Codeine      Doxycycline      Elavil [Amitriptyline]      Etodolac      Gabapentin      Lyrica [Pregabalin]      Mushroom      Oxycodone      Oxycodone-Acetaminophen      Vicodin [Hydrocodone-Acetaminophen]        Does this patient have an allergy or hypersensitivity to Oseltamivir (Tamiflu)?  No.      Patient Active Problem List   Diagnosis     Advanced directives, counseling/discussion     Hypertension goal BP (blood pressure) < 150/90     Neurocardiogenic syncope     Mixed hyperlipidemia     MANNY (obstructive sleep apnea)     Nonrheumatic aortic valve insufficiency     Anxiety     Gastroesophageal reflux disease without esophagitis     TIA (transient ischemic attack)       Last recorded GFR on this patient:   GFR Estimate   Date Value Ref Range Status   01/03/2018 60 (L) >60 mL/min/1.7m2 Final     Comment:     Non  GFR Calc     GFR Estimate If Black   Date Value Ref Range Status   01/03/2018 73 >60 mL/min/1.7m2 Final     Comment:      GFR Calc       Sex:  female     Wt Readings from Last 1 Encounters:   01/23/18 154 lb (69.9 kg)       Age:  77 year old     Creatinine   Date Value Ref Range Status   01/03/2018 0.91 0.52 - 1.04 mg/dL Final     Creatinine Clearance Calculator    Does this patient currently have kidney disease? (defined as Chronic Kidney Disease Stage 3,4 or 5 on the problem list or a GFR less than 60 ml/min if known)  No - Tamiflu (oseltamivir):  75 mg BID x 5 days    If further questions/concerns or if  new symptoms develop, call your PCP or [facility] as soon as possible.      Provided home care instructions    General home care instruction:      Avoid contact with people in your household who are at increased risk for more severe complications of influenza (such as pregnant women or people who have a chronic health condition, for example diabetes, heart disease, asthma, or emphysema).    Stay home from work, school, childcare or other public places until your fever (37.8 degrees Celsius [100 degrees Fahrenheit]) has been gone for at least 24 hours, except to seek medical care. (Fever should be gone without the use of fever-reducing medications.) Use a surgical mask if available, or cover your mouth and nose with a tissue if possible if you need to seek medical care. Contact your work place, school, or  as they may have longer exclusion times.    You may continue to shed virus after your fever is gone. Limit your contact with high-risk individuals for 10 days after your symptoms started and be especially careful to cover your coughs/sneezes and wash your hands.    Cover your cough and wash your hands often, and especially after coughing, sneezing, blowing your nose.    Drink plenty of fluids (such as water, broth, sports drinks, electrolyte beverages for children) to prevent dehydration.    Avoid tobacco and second hand smoke.    Get plenty of rest.    Use over-the-counter pain relievers as needed per  instructions.    Do not give aspirin (acetylsalicylic acid) or products that contain aspirin (e.g. bismuth subsalicylate - Pepto Bismol) to children or teenagers 18 years or younger.    A small number of people with influenza do not have fever. If you have respiratory symptoms and are at increased risk for complications of influenza, contact your health care provider to discuss these symptoms.    For parents of infants:    If possible, only family members who are not sick should care  for infants.    Wash your hands with soap and water, or an alcohol-based hand rub (if your hands are not visibly soiled) before caring for your infant.    Cover your mouth and nose with a tissue when coughing or sneezing, and clean your hands.    Contact a health care provider to discuss your illness within 1-2 days if you are    Pregnant    Immunocompromised    Call 911 if you experience:    Difficulty breathing or shortness of breath    Pain or pressure in the chest    Confusion or less responsive than normal    Seizure activity: ongoing or stopped    Severe dehydration or signs of shock     Blue or dusky lips, skin, or nail beds    If further questions/concerns or if new symptoms develop, call your PCP or [facility] as soon as possible.    When to seek medical attention    Contact your health care provider right away if you experience:    A painful sore throat accompanied by fever persists for more than 48 hours    Ear pain, sinus pain, persistent vomiting and/or diarrhea    Oral temperature greater than 104  Fahrenheit (40  Celsius)    Dehydration (e.g., mouth feeling dry, dizzy when sitting/standing, decreased urine output)    Severe or persistent vomiting; unable to keep fluids down    Improvement in flu-like symptoms (fever and cough or sore throat) but then return of fever and worse cough or sore throat    Not drinking enough fluid    Any other concerns not stated above      Additional educational resources include:    http://www.[facility].com    http://www.cdc.gov/flu/  Rachel Young

## 2018-02-01 NOTE — TELEPHONE ENCOUNTER
Patient calling has flu symptoms, please call to discuss symptoms to see if this requires an appointment.

## 2018-02-06 ENCOUNTER — TELEPHONE (OUTPATIENT)
Dept: FAMILY MEDICINE | Facility: CLINIC | Age: 78
End: 2018-02-06

## 2018-02-06 DIAGNOSIS — I10 HYPERTENSION GOAL BP (BLOOD PRESSURE) < 150/90: ICD-10-CM

## 2018-02-06 RX ORDER — POTASSIUM CHLORIDE 1.5 G/1.58G
20 POWDER, FOR SOLUTION ORAL DAILY
Qty: 90 TABLET | Refills: 0 | Status: SHIPPED | OUTPATIENT
Start: 2018-02-06 | End: 2018-02-12

## 2018-02-06 NOTE — TELEPHONE ENCOUNTER
Patient stopped in to the clinic today. [facility] has not filled the following script for her: KLOR-CON M20 ER 20 MEQ TAB RADFORD QTY 30. She is now completely out. Apparently [facility] filled it last time because [facility] hadn't but she has always had it filled via [facility] prior to last month. I believe per the patient that Anisha may be waiting on a prior authorization on this matter. Please call her if you have any questions or concerns and to please let her know what can be done.    Thank you.

## 2018-02-06 NOTE — TELEPHONE ENCOUNTER
Patient is informed of message below.  Patient states the [facility] pharmacy only gave her a 30 day prescription.  Patient states Anisha is waiting for prescription refill.   Medication refilled per RN protocol.  Verbalized good understanding.     Jada Humphrey RN

## 2018-02-06 NOTE — TELEPHONE ENCOUNTER
1/9/18 a prescription sent to [facility] pharmacy.  1/23/18 a prescription sent to [facility] pharmacy.    ? To early for refill.   Patient will need to contact [facility] to determine issue.   Request for PA was not received.  Attempted to call patient, however there is not a voicemail.   Will try again later.  Jada Humphrey RN

## 2018-02-12 ENCOUNTER — TELEPHONE (OUTPATIENT)
Dept: FAMILY MEDICINE | Facility: CLINIC | Age: 78
End: 2018-02-12

## 2018-02-12 DIAGNOSIS — I10 HYPERTENSION GOAL BP (BLOOD PRESSURE) < 150/90: ICD-10-CM

## 2018-02-12 RX ORDER — POTASSIUM CHLORIDE 1500 MG/1
1 TABLET, EXTENDED RELEASE ORAL DAILY
Qty: 90 TABLET | Refills: 0 | Status: SHIPPED | OUTPATIENT
Start: 2018-02-12 | End: 2018-03-26

## 2018-02-12 NOTE — TELEPHONE ENCOUNTER
Information below is reviewed with  Dr Deborah Terrell, Verbal Orders to refill prescription per formulary request. Potassium ER 20 meq 1 tab daily.  New prescription is sent to pharmacy.    Per protocol, will route encounter to be cosigned by provider for Verbal Orders.  Jada Humphrey RN

## 2018-02-12 NOTE — TELEPHONE ENCOUNTER
Current prescription   Potassium CL 20 MEQ POW PKT    Would like to change to formulary alternative   Potassium Choloride Micro 10 or 20 MEQ TAB    [facility]   [phone]  Fax [phone]

## 2018-02-16 ENCOUNTER — TELEPHONE (OUTPATIENT)
Dept: FAMILY MEDICINE | Facility: CLINIC | Age: 78
End: 2018-02-16

## 2018-03-26 ENCOUNTER — TELEPHONE (OUTPATIENT)
Dept: FAMILY MEDICINE | Facility: CLINIC | Age: 78
End: 2018-03-26

## 2018-03-26 DIAGNOSIS — I10 HYPERTENSION GOAL BP (BLOOD PRESSURE) < 150/90: ICD-10-CM

## 2018-03-26 RX ORDER — POTASSIUM CHLORIDE 1500 MG/1
1 TABLET, EXTENDED RELEASE ORAL DAILY
Qty: 90 TABLET | Refills: 0 | Status: SHIPPED | OUTPATIENT
Start: 2018-03-26 | End: 2018-07-11

## 2018-03-26 NOTE — TELEPHONE ENCOUNTER
Pt is wanting to ask some questions about her RX [facility] I believe. Anisha mentions something about it possibly not being covered by insurance anymore and she needs this refilled. Please give pt a call as soon as you can!

## 2018-03-26 NOTE — TELEPHONE ENCOUNTER
Per patient, [facility] did not have the Potassium Chloride available for patients so had to use alternate pharmacy.  Patient states received a letter that this medication is now available and is requesting a new prescription be sent to [facility].   New prescription is sent to pharmacy.    Patient is informed prescription has been sent.  Verbalized good understanding.   Jada Humphrey RN

## 2018-04-04 ENCOUNTER — TELEPHONE (OUTPATIENT)
Dept: ORTHOPEDICS | Facility: CLINIC | Age: 78
End: 2018-04-04

## 2018-04-04 NOTE — TELEPHONE ENCOUNTER
Discussed pending appointment with Orthopedics. I asked her to bring all pertinent information to include readings or disc copies of any Xrays as well as clinic notes from her previous site of care in [place].  Kota Mae OPA

## 2018-04-05 ENCOUNTER — OFFICE VISIT (OUTPATIENT)
Dept: ORTHOPEDICS | Facility: CLINIC | Age: 78
End: 2018-04-05
Payer: MEDICARE

## 2018-04-05 VITALS
HEART RATE: 93 BPM | HEIGHT: 60 IN | RESPIRATION RATE: 16 BRPM | DIASTOLIC BLOOD PRESSURE: 72 MMHG | OXYGEN SATURATION: 93 % | BODY MASS INDEX: 30.15 KG/M2 | SYSTOLIC BLOOD PRESSURE: 141 MMHG | WEIGHT: 153.6 LBS

## 2018-04-05 DIAGNOSIS — M70.62 TROCHANTERIC BURSITIS OF BOTH HIPS: Primary | ICD-10-CM

## 2018-04-05 DIAGNOSIS — M54.50 CHRONIC LOW BACK PAIN WITHOUT SCIATICA, UNSPECIFIED BACK PAIN LATERALITY: ICD-10-CM

## 2018-04-05 DIAGNOSIS — G89.29 CHRONIC LOW BACK PAIN WITHOUT SCIATICA, UNSPECIFIED BACK PAIN LATERALITY: ICD-10-CM

## 2018-04-05 DIAGNOSIS — M70.61 TROCHANTERIC BURSITIS OF BOTH HIPS: Primary | ICD-10-CM

## 2018-04-05 PROCEDURE — 20610 DRAIN/INJ JOINT/BURSA W/O US: CPT | Mod: 50 | Performed by: ORTHOPAEDIC SURGERY

## 2018-04-05 PROCEDURE — 99203 OFFICE O/P NEW LOW 30 MIN: CPT | Mod: 25 | Performed by: ORTHOPAEDIC SURGERY

## 2018-04-05 ASSESSMENT — PAIN SCALES - GENERAL: PAINLEVEL: EXTREME PAIN (8)

## 2018-04-05 NOTE — PROGRESS NOTES
SUBJECTIVE:  Swati Marinelli is a 77 year old female who is seen as self referral for evaluation of bilateral hip pain that has been present for many years. The patient has had many steroid injections in the past, the most recent injection being 05/08/17 in [place].    Present symptoms: lateral hip pain and low back pain. No groin pain.  Symptoms occur while walking and laying on the hips  Treatments up to this point: steroid injections and PT in the past    Orthopedic PMH:    Shoulder: Massive right rotator cuff tear, right RCR 08/20/09, and OA of the right shoulder. History of left RCR in the past.   Knee: OA left knee where she had previous injections including viscosupplementation. History of left TKA on 06/13/11 complicated by some stiffness. She had a post-operative manipulation and polyethlyene exchange on 03/26/12. History of right knee steroid injection in the past for OA.   Hip: Bilateral hip pain with 4-5 steroid injections in the past. She was receiving them every 4 months.   Back: History of ESIs in the past wich didn't help her pain   Ankle: Left achilles tendon repair rupture   General: No known history of RA, history of diffuse joint pains, and history of long-term Tylenol 3 usage.    Review of Systems:  Constitutional:  NEGATIVE for fever, chills, change in weight  Integumentary/Skin:  NEGATIVE for worrisome rashes, moles or lesions  Eyes:  NEGATIVE for vision changes or irritation  ENT/Mouth:  NEGATIVE for ear, mouth and throat problems  Resp:  NEGATIVE for significant cough or SOB  Breast:  NEGATIVE for masses, tenderness or discharge  CV:  NEGATIVE for chest pain, palpitations or peripheral edema  GI:  NEGATIVE for nausea, abdominal pain, heartburn, or change in bowel habits  :  Negative   Musculoskeletal:  See HPI above  Neuro:  NEGATIVE for weakness, dizziness or paresthesias  Endocrine:  NEGATIVE for temperature intolerance, skin/hair changes  Heme/allergy/immune:  NEGATIVE for bleeding  "problems  Psychiatric:  NEGATIVE for changes in mood or affect    Other PMH:   Past Medical History:   Diagnosis Date     Anxiety      Aortic regurgitation      GERD (gastroesophageal reflux disease)      HTN (hypertension)      Neurocardiogenic syncope      MANNY (obstructive sleep apnea)      TIA (transient ischemic attack)     occiptal     Surgical:   Past Surgical History:   Procedure Laterality Date     APPENDECTOMY OPEN CHILD  1951     C TOTAL KNEE ARTHROPLASTY Left 2011     C TOTAL KNEE ARTHROPLASTY Left 2013    revision     CARPAL TUNNEL RELEASE RT/LT       CATARACT IOL, RT/LT  2009    right 2/4/09, left 2/21/09     CHOLECYSTECTOMY  06/2012     COLONOSCOPY  2012    normal     MOHS MICROGRAPHIC PROCEDURE  2011    back - 2011, lip and back 5/2012     ROTATOR CUFF REPAIR RT/LT Right 2009     ROTATOR CUFF REPAIR RT/LT Left 04/20/2016     TONSILLECTOMY       Family Hx:    Family History   Problem Relation Age of Onset     Tuberculosis Mother      Myocardial Infarction Father      Myocardial Infarction Brother      Heart Surgery Daughter      Social Hx:   Social History     Social History     Marital status:      Spouse name: N/A     Number of children: N/A     Years of education: N/A     Social History Main Topics     Smoking status: Never Smoker     Smokeless tobacco: Never Used     Alcohol use No     Drug use: No     Sexual activity: No     OBJECTIVE:  Physical Exam:  /72 (BP Location: Right arm, Patient Position: Chair, Cuff Size: Adult Regular)  Pulse 93  Resp 16  Ht 1.511 m (4' 11.5\")  Wt 69.7 kg (153 lb 9.6 oz)  SpO2 93%  BMI 30.5 kg/m2  General Appearance: healthy, alert and no distress   Skin: no suspicious lesions or rashes  Neuro: Normal strength and tone, mentation intact and speech normal  Vascular: good pulses, and cappillary refill   Lymph: no lymphadenopathy   Psych:  mentation appears normal and affect normal/bright  Resp: no increased work of breathing    Bilateral Hip " Exam:  Lumbar range of motion:   Flexion: to touch mid shin   Extension some limitations and does reproduce her hip pain   Side bend: causes hip pain  Toe stand: able.    Heel stand: able  Seated SLR: negative  Supine SLR: negative    Tender: Bilateral greater trochanter and sciatic notches without radiation  Right Hip ROM:   Flexion: 110* degrees, no flexion contracture   Internal rotation: 10 degrees,    External rotation: 45 degrees with groin pain   Abduction 35 degrees  Left Hip ROM:   Flexion: full, no flexion contracture   Internal rotation: 20 degrees   External rotation: 40 degrees   Abduction: full  Strength: Good hip flexion and abduction strength bilaterally  Trendelenburg's: negative bilaterally    ASSESSMENT:   Greater trochanteric bursitis, bilateral hips  Possible right hip osteoarthritis     PLAN:   PT ordered. We decided to proceed with bilateral hip steroid injections today. With the patient's consent, bilateral hips injected in the greater trochanteric bursa with 80mg of Depomedrol and 2cc of local anesthetic after sterile prep. I recommended a Rheumatology consultation. All questions were answered.    Return to clinic: PRN, new x-rays of the right hip whenever she returns    JAYDEN Heller MD  Dept. Orthopedic Surgery  [facility]    This document serves as a record of the services and decisions personally performed and made by Dr. JAYDEN Heller MD. It was created on his behalf by Mike Mccain, a trained medical scribe. The creation of this record is based on the provider's personal observations and the statements of the patient. This document has been checked and approved by the attending provider.   Mike Mccain April 5, 2018 2:38 PM

## 2018-04-05 NOTE — MR AVS SNAPSHOT
After Visit Summary   4/5/2018    Swati Marinelli    MRN: [id number]           Patient Information     Date Of Birth          1940        Visit Information        Provider Department      4/5/2018 2:00 PM Castillo Heller MD [facility]        Today's Diagnoses     Trochanteric bursitis of both hips    -  1      Care Instructions    Please remember to call and schedule a follow up appointment if one was recommended at your earliest convenience.   Orthopedics CLINIC HOURS TELEPHONE NUMBER   Castillo Heller M.D.       Tuesday 8 am -5 pm    1st & 3rd Wednesday  1-4pm [place] 2nd & 4th Wednesday  8 am -11 pm / [place]  1-4pm / [place] Thursday 8 am -5 pm   Specialty schedulers:   [phone] to make an appointment with any Specialty Provider.   Main Clinic:   [phone] to make an appointment with your primary provider   Urgent Care locations:    [facility] Monday-Friday 5 pm - 9 pm  Saturday-Sunday 9 am -5pm      Monday-Friday 11 am - 9 pm  Saturday 9 am - 5 pm [phone]     If SURGERY has been recommended, please call our Specialty Schedulers at [phone] to schedule your procedure.    If you need a medication refill, please contact your pharmacy. Please allow 3 business days for your refill to be completed.  Use [employer] (secure e-mail communication and access to your chart) to send a message or to make an appointment. Please ask how you can sign up for [employer].    Cortisone Injections  Cortisone is a type of steroid. It can greatly reduce inflammation (swelling, redness, and irritation). Being injected with cortisone is simple and doesn t take long. But your doctor may ask you questions about your health. Certain medical conditions, such as diabetes, can be affected by cortisone.     Your pain may be relieved by a cortisone injection.    Why Have a Cortisone Injection?  Injecting cortisone can relieve pain for anything from a  "sports injury to arthritis. Your doctor may suggest an injection if rest, splints, or oral medication doesn t relieve your pain. Injecting cortisone is simpler than having surgery. And cortisone may provide the lasting pain relief that can help you get out and enjoy life again.  Getting the Injection  Your injection will  start by cleaning and numbing your skin at the injection site. Next, you ll be injected with local anesthetics (for short-term pain relief) and cortisone. The injection may last a few moments. A small bandage will be applied over the injection site. You ll then be ready to go home.  After Your Injection  After being injected, make sure you don t injure the treated region. But stay active. Enjoy a walk or some other mild activity. Just be careful not to strain the region that gave you trouble.  The Next Day or Two  Some patients feel more pain after being injected. This is normal, and it will go away soon. Applying ice for 20 minutes at a time to your injury may reduce the increased pain. Rest for the first day or two. You don t need to stay in bed. But avoid tasks that may strain the injured region.  If You Have Diabetes  Cortisone injections can cause blood sugar to be increased for several days after the injection. Follow your regular plan for what to do when your blood sugar is elevated.     You may have the area injected, in general, every three to four months.  This is the estimated amount of time that the body takes to metabolize the \"cortisone.\"  Getting injections too frequently may result in a softening of the cartilage and cause the joint to actually wear out more quickly.          Follow-ups after your visit        Your next 10 appointments already scheduled     Jul 11, 2018  9:15 AM CDT   Office Visit with Deborah Terrell MD   [facility]    [address] [facility] [address] [phone]           Bring a current list of meds and " "any records pertaining to this visit. For Physicals, please bring immunization records and any forms needing to be filled out. Please arrive 10 minutes early to complete paperwork.              Who to contact     If you have questions or need follow up information about today's clinic visit or your schedule please contact [facility] directly at [phone].  Normal or non-critical lab and imaging results will be communicated to you by MyChart, letter or phone within 4 business days after the clinic has received the results. If you do not hear from us within 7 days, please contact the clinic through pickrsethart or phone. If you have a critical or abnormal lab result, we will notify you by phone as soon as possible.  Submit refill requests through [employer] or call your pharmacy and they will forward the refill request to us. Please allow 3 business days for your refill to be completed.          Additional Information About Your Visit        MyChart Information     [employer] gives you secure access to your electronic health record. If you see a primary care provider, you can also send messages to your care team and make appointments. If you have questions, please call your primary care clinic.  If you do not have a primary care provider, please call [phone] and they will assist you.        Care EveryWhere ID     This is your Care EveryWhere ID. This could be used by other organizations to access your [place] medical records  [id number]        Your Vitals Were     Pulse Respirations Height Pulse Oximetry BMI (Body Mass Index)       93 16 1.511 m (4' 11.5\") 93% 30.5 kg/m2        Blood Pressure from Last 3 Encounters:   04/05/18 141/72   01/23/18 122/72   11/29/17 115/68    Weight from Last 3 Encounters:   04/05/18 69.7 kg (153 lb 9.6 oz)   01/23/18 69.9 kg (154 lb)   11/29/17 70.8 kg (156 lb)              Today, you had the following     No orders found for display       Primary Care Provider Office Phone " # Fax #    Deborah Gisela Terrell [phone] [address] [facility] [address]        Equal Access to Services     SHALINI BALDWIN : Hadbrent juanita souza jad Soermaali, walesiada luqadaha, qaybta kaalmada mariel, rishi bundy katherinemeredith boyle lamelvabarb grady. So [facility] [phone].    ATENCIÓN: Si habla español, tiene a apodaca disposición servicios gratuitos de asistencia lingüística. Llame al [phone].    We comply with applicable federal civil rights laws and [place] laws. We do not discriminate on the basis of race, color, national origin, age, disability, sex, sexual orientation, or gender identity.            Thank you!     Thank you for choosing [facility]  for your care. Our goal is always to provide you with excellent care. Hearing back from our patients is one way we can continue to improve our services. Please take a few minutes to complete the written survey that you may receive in the mail after your visit with us. Thank you!             Your Updated Medication List - Protect others around you: Learn how to safely use, store and throw away your medicines at www.disposemymeds.org.          This list is accurate as of 4/5/18  2:23 PM.  Always use your most recent med list.                   Brand Name Dispense Instructions for use Diagnosis    ALPRAZolam 0.25 MG tablet    XANAX    30 tablet    Take 1-2 tablets (0.25-0.5 mg) by mouth nightly as needed for sleep or anxiety    Anxiety       cetirizine 5 MG tablet    zyrTEC     Take 5 mg by mouth daily        citalopram 20 MG tablet    celeXA    90 tablet    Take 1 tablet (20 mg) by mouth daily    Anxiety       clopidogrel 75 MG tablet    PLAVIX    90 tablet    Take 1 tablet (75 mg) by mouth daily    Transient cerebral ischemia, unspecified type       lisinopril-hydrochlorothiazide 20-25 MG per tablet    PRINZIDE/ZESTORETIC    90 tablet    Take 1 tablet by mouth daily    Hypertension goal BP (blood pressure) < 150/90        loratadine 10 MG tablet    CLARITIN     Take 10 mg by mouth daily        oseltamivir 75 MG capsule    TAMIFLU    10 capsule    Take 1 capsule (75 mg) by mouth 2 times daily    Influenza-like illness       Potassium Chloride ER 20 MEQ Tbcr     90 tablet    Take 1 tablet (20 mEq) by mouth daily    Hypertension goal BP (blood pressure) < 150/90       PROTONIX 40 MG EC tablet   Generic drug:  pantoprazole      Take 40 mg by mouth daily        rosuvastatin 5 MG tablet    CRESTOR    90 tablet    Take 1 tablet (5 mg) by mouth daily    Mixed hyperlipidemia       verapamil HCl  MG Cp24     90 capsule    Take 1 capsule by mouth daily    Hypertension goal BP (blood pressure) < 150/90

## 2018-04-05 NOTE — NURSING NOTE
"A steroid and or Hylan G - F 20 injection was performed on 4/5/2018 at 2:11 PM. Risks,benefits and complications of the injection were discussed with the patient and the patient has elected to proceed after verbal consent. Using sterile technique, the area was prepped with betadine . A 22 Gauge 1.5\" was used to inject the medication(s) listed below.This was well tolerated. No apparent complications. . Did also discuss that if diabetic, recommend close monitoring of blood sugars over the next week as cortisone injections can temporarily elevate blood sugar.    The following medication(s) was given:     MEDICATION: Depo Medrol 80mg per mL  ROUTE: greater trochanter  SITE:bilateral hips   : WTFast (Depo-Medrol)  DOSE: 2 ml  LOT #: [id number]  EXPIRATION DATE:  1/2020    MEDICATION: Lidocaine HCL  1% per mL  : Hospira (Marcaine,Lidoncaine)  DOSE: 8 ml  LOT #: [id number]  EXPIRATION DATE:  1 may 2019    Kota LEWIS    "

## 2018-04-05 NOTE — PATIENT INSTRUCTIONS
Please remember to call and schedule a follow up appointment if one was recommended at your earliest convenience.   Orthopedics CLINIC HOURS TELEPHONE NUMBER   Castillo Heller M.D.       Tuesday 8 am -5 pm    1st & 3rd Wednesday  1-4pm [place] 2nd & 4th Wednesday  8 am -11 pm / [place]  1-4pm / [place] Thursday 8 am -5 pm   Specialty schedulers:   [phone] to make an appointment with any Specialty Provider.   Main Clinic:   [phone] to make an appointment with your primary provider   Urgent Care locations:    [facility] Monday-Friday 5 pm - 9 pm  Saturday-Sunday 9 am -5pm      Monday-Friday 11 am - 9 pm  Saturday 9 am - 5 pm [phone]     If SURGERY has been recommended, please call our Specialty Schedulers at [phone] to schedule your procedure.    If you need a medication refill, please contact your pharmacy. Please allow 3 business days for your refill to be completed.  Use [employer] (secure e-mail communication and access to your chart) to send a message or to make an appointment. Please ask how you can sign up for [employer].    Cortisone Injections  Cortisone is a type of steroid. It can greatly reduce inflammation (swelling, redness, and irritation). Being injected with cortisone is simple and doesn t take long. But your doctor may ask you questions about your health. Certain medical conditions, such as diabetes, can be affected by cortisone.     Your pain may be relieved by a cortisone injection.    Why Have a Cortisone Injection?  Injecting cortisone can relieve pain for anything from a sports injury to arthritis. Your doctor may suggest an injection if rest, splints, or oral medication doesn t relieve your pain. Injecting cortisone is simpler than having surgery. And cortisone may provide the lasting pain relief that can help you get out and enjoy life again.  Getting the Injection  Your injection will  start by cleaning and numbing your skin  "at the injection site. Next, you ll be injected with local anesthetics (for short-term pain relief) and cortisone. The injection may last a few moments. A small bandage will be applied over the injection site. You ll then be ready to go home.  After Your Injection  After being injected, make sure you don t injure the treated region. But stay active. Enjoy a walk or some other mild activity. Just be careful not to strain the region that gave you trouble.  The Next Day or Two  Some patients feel more pain after being injected. This is normal, and it will go away soon. Applying ice for 20 minutes at a time to your injury may reduce the increased pain. Rest for the first day or two. You don t need to stay in bed. But avoid tasks that may strain the injured region.  If You Have Diabetes  Cortisone injections can cause blood sugar to be increased for several days after the injection. Follow your regular plan for what to do when your blood sugar is elevated.     You may have the area injected, in general, every three to four months.  This is the estimated amount of time that the body takes to metabolize the \"cortisone.\"  Getting injections too frequently may result in a softening of the cartilage and cause the joint to actually wear out more quickly.  "

## 2018-04-06 NOTE — PROGRESS NOTES
SUBJECTIVE:  Swati Marinelli is a 77 year old female who is seen as self referral for evaluation of bilateral hip pain that has been present for many years. The patient has had many steroid injections in the past, the most recent injection being 05/08/17 in [place].    Present symptoms: lateral hip pain and low back pain. No groin pain.  Symptoms occur while walking and laying on the hips  Treatments up to this point: steroid injections and PT in the past    Orthopedic PMH:    Shoulder: Massive right rotator cuff tear, right RCR 08/20/09, and OA of the right shoulder. History of left RCR in the past.   Knee: OA left knee where she had previous injections including viscosupplementation. History of left TKA on 06/13/11 complicated by stiffness. She had a post-operative manipulation and polyethlyene exchange on 03/26/12.   History of right knee steroid injection in the past for OA.   Hip: Bilateral hip pain with 4-5 steroid injections in the past. She was receiving them every 4 months.   Back: History of ESIs in the past wich didn't help her pain   Ankle: Left achilles tendon repair rupture   General: No known history of RA, history of diffuse joint pains, and history of long-term Tylenol 3 usage.    Review of Systems:  Constitutional:  NEGATIVE for fever, chills, change in weight  Integumentary/Skin:  NEGATIVE for worrisome rashes, moles or lesions  Eyes:  NEGATIVE for vision changes or irritation  ENT/Mouth:  NEGATIVE for ear, mouth and throat problems  Resp:  NEGATIVE for significant cough or SOB  Breast:  NEGATIVE for masses, tenderness or discharge  CV:  NEGATIVE for chest pain, palpitations or peripheral edema  GI:  NEGATIVE for nausea, abdominal pain, heartburn, or change in bowel habits  :  Negative   Musculoskeletal:  See HPI above  Neuro:  NEGATIVE for weakness, dizziness or paresthesias  Endocrine:  NEGATIVE for temperature intolerance, skin/hair changes  Heme/allergy/immune:  NEGATIVE for bleeding  "problems  Psychiatric:  NEGATIVE for changes in mood or affect    Other PMH:   Past Medical History:   Diagnosis Date     Anxiety      Aortic regurgitation      GERD (gastroesophageal reflux disease)      HTN (hypertension)      Neurocardiogenic syncope      MANNY (obstructive sleep apnea)      TIA (transient ischemic attack)     occiptal     Surgical:   Past Surgical History:   Procedure Laterality Date     APPENDECTOMY OPEN CHILD  1951     C TOTAL KNEE ARTHROPLASTY Left 2011     C TOTAL KNEE ARTHROPLASTY Left 2013    revision     CARPAL TUNNEL RELEASE RT/LT       CATARACT IOL, RT/LT  2009    right 2/4/09, left 2/21/09     CHOLECYSTECTOMY  06/2012     COLONOSCOPY  2012    normal     MOHS MICROGRAPHIC PROCEDURE  2011    back - 2011, lip and back 5/2012     ROTATOR CUFF REPAIR RT/LT Right 2009     ROTATOR CUFF REPAIR RT/LT Left 04/20/2016     TONSILLECTOMY       Family Hx:    Family History   Problem Relation Age of Onset     Tuberculosis Mother      Myocardial Infarction Father      Myocardial Infarction Brother      Heart Surgery Daughter      Social Hx:   Social History     Social History     Marital status:      Spouse name: N/A     Number of children: N/A     Years of education: N/A     Social History Main Topics     Smoking status: Never Smoker     Smokeless tobacco: Never Used     Alcohol use No     Drug use: No     Sexual activity: No     OBJECTIVE:  Physical Exam:  /72 (BP Location: Right arm, Patient Position: Chair, Cuff Size: Adult Regular)  Pulse 93  Resp 16  Ht 1.511 m (4' 11.5\")  Wt 69.7 kg (153 lb 9.6 oz)  SpO2 93%  BMI 30.5 kg/m2  General Appearance: healthy, alert and no distress   Skin: no suspicious lesions or rashes  Neuro: Normal strength and tone, mentation intact and speech normal  Vascular: good pulses, and cappillary refill   Lymph: no lymphadenopathy   Psych:  mentation appears normal and affect normal/bright  Resp: no increased work of breathing    Bilateral Hip " Exam:  Lumbar range of motion:   Flexion: to touch mid shin   Extension some limitations and does reproduce her hip pain   Side bend: causes hip pain  Toe stand: able.    Heel stand: able  Seated SLR: negative  Supine SLR: negative    Tender: Bilateral greater trochanter and sciatic notches without radiation  Right Hip ROM:   Flexion: 110* degrees, no flexion contracture   Internal rotation: 10 degrees,    External rotation: 45 degrees with groin pain   Abduction 35 degrees  Left Hip ROM:   Flexion: full, no flexion contracture   Internal rotation: 20 degrees   External rotation: 40 degrees   Abduction: full  Strength: Good hip flexion and abduction strength bilaterally  Trendelenburg's: negative bilaterally    ASSESSMENT:   Greater trochanteric bursitis, bilateral hips  Possible right hip osteoarthritis     PLAN:   PT ordered. We decided to proceed with bilateral hip steroid injections today. With the patient's consent, bilateral hips injected in the greater trochanteric bursa with 80mg of Depomedrol and 2cc of local anesthetic after sterile prep. I recommended a Rheumatology consultation due to polyarthralgias. All questions were answered.    Return to clinic: PRN, new x-rays of the right hip whenever she returns    JAYDEN Heller MD  Dept. Orthopedic Surgery  [facility]    This document serves as a record of the services and decisions personally performed and made by Dr. JAYDEN Heller MD. It was created on his behalf by Mike Mccain, a trained medical scribe. The creation of this record is based on the provider's personal observations and the statements of the patient. This document has been checked and approved by the attending provider.   Mike Mccain April 5, 2018 2:38 PM

## 2018-04-17 DIAGNOSIS — I10 HYPERTENSION GOAL BP (BLOOD PRESSURE) < 150/90: ICD-10-CM

## 2018-04-17 DIAGNOSIS — E78.2 MIXED HYPERLIPIDEMIA: ICD-10-CM

## 2018-04-17 DIAGNOSIS — F41.9 ANXIETY: ICD-10-CM

## 2018-04-17 DIAGNOSIS — G45.9 TRANSIENT CEREBRAL ISCHEMIA, UNSPECIFIED TYPE: ICD-10-CM

## 2018-04-18 RX ORDER — CLOPIDOGREL BISULFATE 75 MG/1
TABLET ORAL
Qty: 90 TABLET | Refills: 0 | Status: SHIPPED | OUTPATIENT
Start: 2018-04-18 | End: 2018-07-11

## 2018-04-18 RX ORDER — LISINOPRIL AND HYDROCHLOROTHIAZIDE 20; 25 MG/1; MG/1
TABLET ORAL
Qty: 90 TABLET | Refills: 0 | Status: SHIPPED | OUTPATIENT
Start: 2018-04-18 | End: 2018-07-11

## 2018-04-18 RX ORDER — ROSUVASTATIN CALCIUM 5 MG/1
TABLET, COATED ORAL
Qty: 90 TABLET | Refills: 0 | Status: SHIPPED | OUTPATIENT
Start: 2018-04-18 | End: 2018-07-11

## 2018-04-18 RX ORDER — CITALOPRAM HYDROBROMIDE 20 MG/1
TABLET ORAL
Qty: 90 TABLET | Refills: 0 | Status: SHIPPED | OUTPATIENT
Start: 2018-04-18 | End: 2018-07-11

## 2018-04-18 RX ORDER — VERAPAMIL HYDROCHLORIDE 360 MG/1
CAPSULE, DELAYED RELEASE PELLETS ORAL
Qty: 90 CAPSULE | Refills: 0 | Status: SHIPPED | OUTPATIENT
Start: 2018-04-18 | End: 2018-07-11

## 2018-04-19 ENCOUNTER — OFFICE VISIT (OUTPATIENT)
Dept: INTERNAL MEDICINE | Facility: CLINIC | Age: 78
End: 2018-04-19
Payer: MEDICARE

## 2018-04-19 VITALS
TEMPERATURE: 97.4 F | OXYGEN SATURATION: 97 % | DIASTOLIC BLOOD PRESSURE: 78 MMHG | SYSTOLIC BLOOD PRESSURE: 144 MMHG | HEART RATE: 71 BPM | WEIGHT: 148 LBS | BODY MASS INDEX: 29.06 KG/M2 | RESPIRATION RATE: 14 BRPM | HEIGHT: 60 IN

## 2018-04-19 DIAGNOSIS — M25.50 ARTHRALGIA, UNSPECIFIED JOINT: ICD-10-CM

## 2018-04-19 DIAGNOSIS — M79.10 MYALGIA: ICD-10-CM

## 2018-04-19 DIAGNOSIS — R76.8 ANA POSITIVE: ICD-10-CM

## 2018-04-19 DIAGNOSIS — G45.3 AMAUROSIS FUGAX: ICD-10-CM

## 2018-04-19 DIAGNOSIS — E66.89 OTHER OBESITY: ICD-10-CM

## 2018-04-19 DIAGNOSIS — I10 HYPERTENSION GOAL BP (BLOOD PRESSURE) < 150/90: ICD-10-CM

## 2018-04-19 DIAGNOSIS — G47.33 OSA (OBSTRUCTIVE SLEEP APNEA): Primary | ICD-10-CM

## 2018-04-19 LAB
ALBUMIN SERPL-MCNC: 3.7 G/DL (ref 3.4–5)
ALP SERPL-CCNC: 61 U/L (ref 40–150)
ALT SERPL W P-5'-P-CCNC: 25 U/L (ref 0–50)
AST SERPL W P-5'-P-CCNC: 16 U/L (ref 0–45)
BILIRUB DIRECT SERPL-MCNC: 0.1 MG/DL (ref 0–0.2)
BILIRUB SERPL-MCNC: 0.4 MG/DL (ref 0.2–1.3)
CK SERPL-CCNC: 36 U/L (ref 30–225)
ERYTHROCYTE [DISTWIDTH] IN BLOOD BY AUTOMATED COUNT: 14.9 % (ref 10–15)
FOLATE SERPL-MCNC: 31.5 NG/ML
HCT VFR BLD AUTO: 45.4 % (ref 35–47)
HGB BLD-MCNC: 15.2 G/DL (ref 11.7–15.7)
MCH RBC QN AUTO: 30.1 PG (ref 26.5–33)
MCHC RBC AUTO-ENTMCNC: 33.5 G/DL (ref 31.5–36.5)
MCV RBC AUTO: 90 FL (ref 78–100)
PLATELET # BLD AUTO: 235 10E9/L (ref 150–450)
PROT SERPL-MCNC: 7.1 G/DL (ref 6.8–8.8)
RBC # BLD AUTO: 5.05 10E12/L (ref 3.8–5.2)
TSH SERPL DL<=0.005 MIU/L-ACNC: 2.68 MU/L (ref 0.4–4)
VIT B12 SERPL-MCNC: 343 PG/ML (ref 193–986)
WBC # BLD AUTO: 10.5 10E9/L (ref 4–11)

## 2018-04-19 PROCEDURE — 82607 VITAMIN B-12: CPT | Performed by: INTERNAL MEDICINE

## 2018-04-19 PROCEDURE — 82550 ASSAY OF CK (CPK): CPT | Performed by: INTERNAL MEDICINE

## 2018-04-19 PROCEDURE — 86039 ANTINUCLEAR ANTIBODIES (ANA): CPT | Performed by: INTERNAL MEDICINE

## 2018-04-19 PROCEDURE — 82746 ASSAY OF FOLIC ACID SERUM: CPT | Performed by: INTERNAL MEDICINE

## 2018-04-19 PROCEDURE — 36415 COLL VENOUS BLD VENIPUNCTURE: CPT | Performed by: INTERNAL MEDICINE

## 2018-04-19 PROCEDURE — 84443 ASSAY THYROID STIM HORMONE: CPT | Performed by: INTERNAL MEDICINE

## 2018-04-19 PROCEDURE — 82306 VITAMIN D 25 HYDROXY: CPT | Performed by: INTERNAL MEDICINE

## 2018-04-19 PROCEDURE — 80076 HEPATIC FUNCTION PANEL: CPT | Performed by: INTERNAL MEDICINE

## 2018-04-19 PROCEDURE — 86038 ANTINUCLEAR ANTIBODIES: CPT | Performed by: INTERNAL MEDICINE

## 2018-04-19 PROCEDURE — 99214 OFFICE O/P EST MOD 30 MIN: CPT | Performed by: INTERNAL MEDICINE

## 2018-04-19 PROCEDURE — 85027 COMPLETE CBC AUTOMATED: CPT | Performed by: INTERNAL MEDICINE

## 2018-04-19 NOTE — PROGRESS NOTES
SUBJECTIVE:   Swait Marinelli is a 77 year old female who presents to clinic today for the following health issues:      Myalgais/arthralgias:  Moved from [place] in 10/2017.  Patient hurts all over her body.  Duration: for many years.  She feels that this has not been focused on in the past few years, as she does not tend to bring it up at drs' appointments.   Her PCP in this clinic has suggested that she see a Rheumatologist.    Patient is interested in a referral to RHEUM - arthritis (allergic to many Nsaid and pain relievers):  The cold weather makes it worse.  It is alleviated by: heating pads provide some relief.   Patient reports being also allergic to aspirin: she is not sure of the reaction, but she needed to be given Epinephrine and was taken to the ED.  Naproxen: had caused facial swelling.  She has tried acetaminophen and is not allergic to it, but it has not been effective.   Xrays in past? Foot XR 2017 with: first MTP joint degenerative change. Of a hallux.     Labs relevant to the aches in the past? Borderline low kidney function in 2018. No recent AI workup.  Patient does snore when she sleeps. She does recall being diagnosed with MANNY and going to a sleep doctor. She used to have a CPAP, but did not take it with her when she moved from [place] to [place].  She has not noted a worsening of her pains, when she stopped her CPAP.   She does report morning joint stiffness, and it lasts for over an hour.   Rash? No  F/c? No   Family history of autoimmune disease or rheumatological disease? Patient is unsure. Patient's father had rheumatic *fever* and  young, of a massive heart attack.   Joint swelling? Maybe, per the patient.    Reviewed and updated as needed this visit by clinical staff  Tobacco  Allergies  Meds  Problems  Med Hx  Surg Hx  Fam Hx  Soc Hx        Reviewed and updated as needed this visit by Provider  Meds  Problems           Patient Active Problem List   Diagnosis     Advanced directives,  counseling/discussion     Hypertension goal BP (blood pressure) < 150/90     Neurocardiogenic syncope     Mixed hyperlipidemia     MANNY (obstructive sleep apnea)     Nonrheumatic aortic valve insufficiency     Anxiety     Gastroesophageal reflux disease without esophagitis     TIA (transient ischemic attack)       Past Medical History:   Diagnosis Date     Anxiety      Aortic regurgitation      GERD (gastroesophageal reflux disease)      HTN (hypertension)      Neurocardiogenic syncope      MANNY (obstructive sleep apnea)      TIA (transient ischemic attack)     occiptal       Past Surgical History:   Procedure Laterality Date     APPENDECTOMY OPEN CHILD  1951     C TOTAL KNEE ARTHROPLASTY Left 2011     C TOTAL KNEE ARTHROPLASTY Left 2013    revision     CARPAL TUNNEL RELEASE RT/LT       CATARACT IOL, RT/LT  2009    right 2/4/09, left 2/21/09     CHOLECYSTECTOMY  06/2012     COLONOSCOPY  2012    normal     MOHS MICROGRAPHIC PROCEDURE  2011    back - 2011, lip and back 5/2012     ROTATOR CUFF REPAIR RT/LT Right 2009     ROTATOR CUFF REPAIR RT/LT Left 04/20/2016     TONSILLECTOMY         Family History   Problem Relation Age of Onset     Tuberculosis Mother      Myocardial Infarction Father      Myocardial Infarction Brother      Heart Surgery Daughter        Social History   Substance Use Topics     Smoking status: Never Smoker     Smokeless tobacco: Never Used     Alcohol use No       Current Outpatient Prescriptions   Medication     ALPRAZolam (XANAX) 0.25 MG tablet     cetirizine (ZYRTEC) 5 MG tablet     citalopram (CELEXA) 20 MG tablet     clopidogrel (PLAVIX) 75 MG tablet     lisinopril-hydrochlorothiazide (PRINZIDE/ZESTORETIC) 20-25 MG per tablet     loratadine (CLARITIN) 10 MG tablet     pantoprazole (PROTONIX) 40 MG EC tablet     Potassium Chloride ER 20 MEQ TBCR     rosuvastatin (CRESTOR) 5 MG tablet     verapamil HCl  MG CP24     No current facility-administered medications for this visit.   "        ROS:  Constitutional, HEENT, cardiovascular, pulmonary, GI, , musculoskeletal, neuro, skin, endocrine and psych systems are negative, except as otherwise noted.     OBJECTIVE:                                                    /78  Pulse 71  Temp 97.4  F (36.3  C) (Oral)  Resp 14  Ht 4' 11.5\" (1.511 m)  Wt 148 lb (67.1 kg)  SpO2 97%  BMI 29.39 kg/m2     GENERAL APPEARANCE: healthy, alert and in no distress  EYES: Eyes grossly normal to inspection, and conjunctivae and sclerae normal  HENT: head normocephalic and atraumatic and mouth without ulcers or lesions, oropharynx clear and oral mucous membranes moist  NECK: no noticeable adenopathy, no asymmetry, masses, or scars   RESP: lungs clear to auscultation - no rales, rhonchi or wheezes  CV: regular rate and rhythm, normal S1 S2, no S3 or S4, no murmur, click or rub, no peripheral edema and peripheral pulses strong  ABDOMEN: soft, nontender, no hepatosplenomegaly, no masses and bowel sounds normal  MS: no musculoskeletal defects are noted and gait is age appropriate without ataxia  SKIN: no suspicious lesions or rashes  NEURO: mentation intact and speech normal  PSYCH: mentation appears normal and affect normal/bright.    Results for orders placed or performed in visit on 04/19/18   TSH with free T4 reflex   Result Value Ref Range    TSH 2.68 0.40 - 4.00 mU/L   CBC with platelets   Result Value Ref Range    WBC 10.5 4.0 - 11.0 10e9/L    RBC Count 5.05 3.8 - 5.2 10e12/L    Hemoglobin 15.2 11.7 - 15.7 g/dL    Hematocrit 45.4 35.0 - 47.0 %    MCV 90 78 - 100 fl    MCH 30.1 26.5 - 33.0 pg    MCHC 33.5 31.5 - 36.5 g/dL    RDW 14.9 10.0 - 15.0 %    Platelet Count 235 150 - 450 10e9/L   Vitamin B12   Result Value Ref Range    Vitamin B12 343 193 - 986 pg/mL   Folate   Result Value Ref Range    Folate 31.5 >5.4 ng/mL   Vitamin D Deficiency   Result Value Ref Range    Vitamin D Deficiency screening 53 20 - 75 ug/L   Anti Nuclear Kayleigh IgG by IFA with " Reflex   Result Value Ref Range    ANH interpretation Borderline Positive (A) NEG^Negative    ANH pattern 1 SPECKLED     ANH titer 1 1:80    CK total   Result Value Ref Range    CK Total 36 30 - 225 U/L   Hepatic panel (Albumin, ALT, AST, Bili, Alk Phos, TP)   Result Value Ref Range    Bilirubin Direct 0.1 0.0 - 0.2 mg/dL    Bilirubin Total 0.4 0.2 - 1.3 mg/dL    Albumin 3.7 3.4 - 5.0 g/dL    Protein Total 7.1 6.8 - 8.8 g/dL    Alkaline Phosphatase 61 40 - 150 U/L    ALT 25 0 - 50 U/L    AST 16 0 - 45 U/L       No results found for this or any previous visit (from the past 744 hour(s)).      ASSESSMENT/PLAN:                                                        ICD-10-CM    1. MANNY (obstructive sleep apnea) G47.33 SLEEP EVALUATION & MANAGEMENT REFERRAL - [facility] [place]  [phone] (Age 15 and up)    pt is due for a repeat sleep study-dxed with MANNY in the past (> 2 yrs ago), has been lost to fup, need a new CPAP   2. Myalgia M79.1 TSH with free T4 reflex     Vitamin D Deficiency   3. Arthralgia, unspecified joint M25.50 TSH with free T4 reflex     CBC with platelets     Vitamin B12     Folate     Anti Nuclear Kayleigh IgG by IFA with Reflex     CK total     Hepatic panel (Albumin, ALT, AST, Bili, Alk Phos, TP)     RHEUMATOLOGY REFERRAL   4. Amaurosis fugax G45.3    5. Hypertension goal BP (blood pressure) < 150/90 I10    6. Other obesity  E66.8 Vitamin D Deficiency   7. ANH positive R76.8 RHEUMATOLOGY REFERRAL    borderline positive ANH test     1: needs a polysomnography for a new CPAP.  2:referred to Rheum in light of symptoms and a borderline positive AHN.  Other issues today: ASSESSMENT: stable  PLAN: continue current regimen     Patient Instructions   We [facility] your [facility] results [facility]  While we are waiting for the labs, please take the acetaminophen as follows:  Please take the Extra strength Tylenol or acetaminophen 500mg tablets as 1,000mg every 8 hours (whether you  have pain or not at that time) for 2-4 weeks.    If your labs show a cause for your aches which is not related to Rheumatology, we will treat this.  If your labs show a cause for your aches which is related to Rheumatology, or if the labs do not show any clear cause for your aches, we will refer you to Rheumatology.    Please call [phone] to schedule a Sleep Study soon.         Lissa Wilson MD    [place] [address] [address] [facility] [address] [phone]

## 2018-04-19 NOTE — MR AVS SNAPSHOT
After Visit Summary   4/19/2018    Swati Marinelli    MRN: [id number]           Patient Information     Date Of Birth          1940        Visit Information        Provider Department      4/19/2018 9:50 AM Lissa Wilson MD [facility]        Today's Diagnoses     MANNY (obstructive sleep apnea)    -  1    Myalgia        Arthralgia, unspecified joint        Amaurosis fugax        Hypertension goal BP (blood pressure) < 150/90        Other obesity           Care Instructions    We will [facility] you know your test results [facility].  While we are waiting for the labs, please take the acetaminophen as follows:  Please take the Extra strength Tylenol or acetaminophen 500mg tablets as 1,000mg every 8 hours (whether you have pain or not at that time) for 2-4 weeks.    If your labs show a cause for your aches which is not related to Rheumatology, we will treat this.  If your labs show a cause for your aches which is related to Rheumatology, or if the labs do not show any clear cause for your aches, we will refer you to Rheumatology.    Please call [phone] to schedule a Sleep Study soon.             Follow-ups after your visit        Additional Services     SLEEP EVALUATION & MANAGEMENT REFERRAL - [facility] [place]  [phone] (Age 15 and up)       Please be aware that coverage of these services is subject to the terms and limitations of your health insurance plan.  Call member services at your health plan with any benefit or coverage questions.      Please bring the following to your appointment:    >>   List of current medications   >>   This referral request   >>   Any documents/labs given to you for this referral                      Your next 10 appointments already scheduled     Jul 11, 2018  9:15 AM CDT   Office Visit with Deborah Terrell MD   [facility] ([facility]    [address] [facility] [address]  "  [phone]           Bring a current list of meds and any records pertaining to this visit. For Physicals, please bring immunization records and any forms needing to be filled out. Please arrive 10 minutes early to complete paperwork.              Future tests that were ordered for you today     Open Future Orders        Priority Expected Expires Ordered    SLEEP EVALUATION & MANAGEMENT REFERRAL - ADULT -[facility] [place]  [phone] (Age 15 and up) Routine  4/19/2019 4/19/2018            Who to contact     If you have questions or need follow up information about today's clinic visit or your schedule please contact [facility] directly at [phone].  Normal or non-critical lab and imaging results will be communicated to you by MyChart, letter or phone within 4 business days after the clinic has received the results. If you do not hear from us within 7 days, please contact the clinic through [employer] or phone. If you have a critical or abnormal lab result, we will notify you by phone as soon as possible.  Submit refill requests through "[employer] or call your pharmacy and they will forward the refill request to us. Please allow 3 business days for your refill to be completed.          Additional Information About Your Visit        MyChart Information     "[employer] gives you secure access to your electronic health record. If you see a primary care provider, you can also send messages to your care team and make appointments. If you have questions, please call your primary care clinic.  If you do not have a primary care provider, please call [phone] and they will assist you.        Care EveryWhere ID     This is your Care EveryWhere ID. This could be used by other organizations to access your [facility] medical records  [id number]        Your Vitals Were     Pulse Temperature Respirations Height Pulse Oximetry BMI (Body Mass Index)    71 97.4  F (36.3  C) (Oral) 14 4' 11.5\" " (1.511 m) 97% 29.39 kg/m2       Blood Pressure from Last 3 Encounters:   04/19/18 144/78   04/05/18 141/72   01/23/18 122/72    Weight from Last 3 Encounters:   04/19/18 148 lb (67.1 kg)   04/05/18 153 lb 9.6 oz (69.7 kg)   01/23/18 154 lb (69.9 kg)              We Performed the Following     Anti Nuclear Kayleigh IgG by IFA with Reflex     CBC with platelets     CK total     Folate     Hepatic panel (Albumin, ALT, AST, Bili, Alk Phos, TP)     TSH with free T4 reflex     Vitamin B12     Vitamin D Deficiency        Primary Care Provider Office Phone # Fax #    Deborah Gisela Terrell [phone] [address] [facility] [address]        Equal Access to Services     SHALINI BALDWIN : Hadii aad ku hadasho Soadry, waaxda luqadaha, qaybta kaalmada adeegyada, rishi shaffer . So [facility] [phone].    ATENCIÓN: Si habla español, tiene a apodaca disposición servicios gratuitos de asistencia lingüística. Nely al [phone].    We comply with applicable federal civil rights laws and [place] laws. We do not discriminate on the basis of race, color, national origin, age, disability, sex, sexual orientation, or gender identity.            Thank you!     Thank you for choosing [facility]  for your care. Our goal is always to provide you with excellent care. Hearing back from our patients is one way we can continue to improve our services. Please take a few minutes to complete the written survey that you may receive in the mail after your visit with us. Thank you!             Your Updated Medication List - Protect others around you: Learn how to safely use, store and throw away your medicines at www.disposemymeds.org.          This list is accurate as of 4/19/18 10:54 AM.  Always use your most recent med list.                   Brand Name Dispense Instructions for use Diagnosis    ALPRAZolam 0.25 MG tablet    XANAX    30 tablet    Take 1-2 tablets (0.25-0.5 mg) by mouth  nightly as needed for sleep or anxiety    Anxiety       cetirizine 5 MG tablet    zyrTEC     Take 5 mg by mouth daily        citalopram 20 MG tablet    celeXA    90 tablet    TAKE 1 TABLET (20 MG) BY MOUTH DAILY    Anxiety       clopidogrel 75 MG tablet    PLAVIX    90 tablet    TAKE 1 TABLET (75 MG) BY MOUTH DAILY    Transient cerebral ischemia, unspecified type       lisinopril-hydrochlorothiazide 20-25 MG per tablet    PRINZIDE/ZESTORETIC    90 tablet    TAKE 1 TABLET EVERY DAY    Hypertension goal BP (blood pressure) < 150/90       loratadine 10 MG tablet    CLARITIN     Take 10 mg by mouth daily        Potassium Chloride ER 20 MEQ Tbcr     90 tablet    Take 1 tablet (20 mEq) by mouth daily    Hypertension goal BP (blood pressure) < 150/90       PROTONIX 40 MG EC tablet   Generic drug:  pantoprazole      Take 40 mg by mouth daily        rosuvastatin 5 MG tablet    CRESTOR    90 tablet    TAKE 1 TABLET EVERY DAY    Mixed hyperlipidemia       verapamil HCl  MG Cp24     90 capsule    TAKE 1 CAPSULE EVERY DAY    Hypertension goal BP (blood pressure) < 150/90

## 2018-04-19 NOTE — NURSING NOTE
"Chief Complaint   Patient presents with     Referral     discuss rheum referral -  arthritis       Initial /78  Pulse 71  Temp 97.4  F (36.3  C) (Oral)  Resp 14  Ht 4' 11.5\" (1.511 m)  Wt 148 lb (67.1 kg)  SpO2 97%  BMI 29.39 kg/m2 Estimated body mass index is 29.39 kg/(m^2) as calculated from the following:    Height as of this encounter: 4' 11.5\" (1.511 m).    Weight as of this encounter: 148 lb (67.1 kg).  Medication Reconciliation: complete  Lilo Monique M.A.    "

## 2018-04-19 NOTE — PATIENT INSTRUCTIONS
We will let you know your test results [facility].  While we are waiting for the labs, please take the acetaminophen as follows:  Please take the Extra strength Tylenol or acetaminophen 500mg tablets as 1,000mg every 8 hours (whether you have pain or not at that time) for 2-4 weeks.    If your labs show a cause for your aches which is not related to Rheumatology, we will treat this.  If your labs show a cause for your aches which is related to Rheumatology, or if the labs do not show any clear cause for your aches, we will refer you to Rheumatology.    Please call [phone] to schedule a Sleep Study soon.

## 2018-04-20 LAB
ANA PAT SER IF-IMP: ABNORMAL
ANA SER QL IF: ABNORMAL
ANA TITR SER IF: ABNORMAL {TITER}
DEPRECATED CALCIDIOL+CALCIFEROL SERPL-MC: 53 UG/L (ref 20–75)

## 2018-04-22 NOTE — PROGRESS NOTES
"Dear June,     Your test results are attached.    Your test for autoimmune diseases (a group of inflammatory diseases where the body's own immune system attacks itself), called the \"ANH,\" has finished processing and is borderline positive, meaning that you *might* have autoimmune disease.   Please call [phone] to schedule an appointment with the Rheumatologist to discuss this further.     Your other labwork is within normal limits.    Please notify me via [employer] or contact the clinic at [phone] if you have any questions.    Lissa Wilson MD"

## 2018-05-21 ENCOUNTER — TELEPHONE (OUTPATIENT)
Dept: FAMILY MEDICINE | Facility: CLINIC | Age: 78
End: 2018-05-21

## 2018-05-21 DIAGNOSIS — R42 VERTIGO: Primary | ICD-10-CM

## 2018-05-21 NOTE — TELEPHONE ENCOUNTER
Patient calling states she has a history of vertigo, is having problems with this now would like a referral to [facility] in [place], has contacted them but need a referral to be seen. No openings with Dr Cabrera at this time declined first available wondering if she can get a referral or does this require appt. Please call to discuss.

## 2018-05-21 NOTE — TELEPHONE ENCOUNTER
Patient states has a h/o Vertigo.  Symptoms started yesterday while driving home from [place].  Patient notes she has had this several times in the past for many years.  Patient has been to PHYSICAL THERAPY for Vertigo.  They had a machine that turned her upside down, which worked.  That was 2 years ago.  Previously had been very severe.   Patient would like to go to the [facility].   Please advise  Jada Humphrey RN

## 2018-05-21 NOTE — TELEPHONE ENCOUNTER
Information below is reviewed with  Dr Deborah Terrell, Verbal Orders to place referral for [facility].  Please fax refer to above facility, then notify patient when this has been done.    Patient will await call.  Jada Humphrey RN

## 2018-05-29 DIAGNOSIS — I10 HYPERTENSION GOAL BP (BLOOD PRESSURE) < 150/90: Primary | ICD-10-CM

## 2018-05-31 RX ORDER — POTASSIUM CHLORIDE 1500 MG/1
TABLET, EXTENDED RELEASE ORAL
Qty: 90 TABLET | Refills: 1 | Status: SHIPPED | OUTPATIENT
Start: 2018-05-31 | End: 2019-01-14

## 2018-06-08 ENCOUNTER — TELEPHONE (OUTPATIENT)
Dept: FAMILY MEDICINE | Facility: CLINIC | Age: 78
End: 2018-06-08

## 2018-06-08 NOTE — TELEPHONE ENCOUNTER
"Information below is reviewed with  Dr Deborah Terrell, Verbal Orders okay to try the Tumeric and Curcumin in addition to her other medications.  \"the person you called does not have a mailbox set up yet\".  Jada Humphrey RN     "

## 2018-06-08 NOTE — TELEPHONE ENCOUNTER
Patient is calling to speak with  Nurse about taking adding some supplements turmeric, and curcumin wondering if she can take these along with the medication she is taking now   Please call to advice  Thank you

## 2018-06-11 NOTE — TELEPHONE ENCOUNTER
Patient is informed of Verbal Orders.  The patient/parent agrees with the plan and verbalized good understanding.    Per protocol, will route encounter to be cosigned by provider for Verbal Orders.  Jada Humphrey RN

## 2018-07-06 NOTE — PROGRESS NOTES
SUBJECTIVE:   Swati Marinelli is a 78 year old female who presents to clinic today for the following health issues:      Hypertension Follow-up      Outpatient blood pressures are being checked at home.  Results are 120/80.    Low Salt Diet: no added salt  Hypertension ROS: taking medications as instructed, no medication side effects noted, no TIA's, no chest pain on exertion, no dyspnea on exertion, no swelling of ankles.  No headache or visual changes.    Anxiety Follow-Up    Status since last visit: Stable     Other associated symptoms:Needing sleep study, has not had CPAP for several years, not sleeping well, tired all the time, able to fall asleep but doesn't stay asleep.  Tired all day.  Naps during the day but tries not to.    Complicating factors:   Significant life event: Yes-  Moved from [place] to [place] in October, feeling not adjusting as well as had hoped.    Current substance abuse: None  Depression symptoms: Yes-  Low motivation, poor sleep  OMA-7 SCORE 11/14/2017 7/11/2018   Total Score 9 7     Increased anxiety and depression since move here.  She knows she needs improved sleep, spouse noting snoring/gasping and frequent awakening.  Has been diagnosed in past and had cpap many years ago.     OMA-7    Amount of exercise or physical activity: walking, mild exercise     Problems taking medications regularly: No    Medication side effects: none    Diet: low salt            Problem list and histories reviewed & adjusted, as indicated.  Additional history: as documented    Patient Active Problem List   Diagnosis     Advanced directives, counseling/discussion     Hypertension goal BP (blood pressure) < 150/90     Neurocardiogenic syncope     Mixed hyperlipidemia     MANNY (obstructive sleep apnea)     Nonrheumatic aortic valve insufficiency     Anxiety     Gastroesophageal reflux disease without esophagitis     TIA (transient ischemic attack)     Past Surgical History:   Procedure Laterality Date      APPENDECTOMY OPEN CHILD  1951     C TOTAL KNEE ARTHROPLASTY Left 2011     C TOTAL KNEE ARTHROPLASTY Left 2013    revision     CARPAL TUNNEL RELEASE RT/LT       CATARACT IOL, RT/LT  2009    right 2/4/09, left 2/21/09     CHOLECYSTECTOMY  06/2012     COLONOSCOPY  2012    normal     MOHS MICROGRAPHIC PROCEDURE  2011    back - 2011, lip and back 5/2012     ROTATOR CUFF REPAIR RT/LT Right 2009     ROTATOR CUFF REPAIR RT/LT Left 04/20/2016     TONSILLECTOMY         Social History   Substance Use Topics     Smoking status: Never Smoker     Smokeless tobacco: Never Used     Alcohol use No     Family History   Problem Relation Age of Onset     Tuberculosis Mother      Myocardial Infarction Father      Myocardial Infarction Brother      Heart Surgery Daughter          Current Outpatient Prescriptions   Medication Sig Dispense Refill     ALPRAZolam (XANAX) 0.25 MG tablet Take 1-2 tablets (0.25-0.5 mg) by mouth nightly as needed for sleep or anxiety 30 tablet 0     cetirizine (ZYRTEC) 5 MG tablet Take 5 mg by mouth daily       citalopram (CELEXA) 20 MG tablet TAKE 1 TABLET (20 MG) BY MOUTH DAILY 90 tablet 1     clopidogrel (PLAVIX) 75 MG tablet TAKE 1 TABLET (75 MG) BY MOUTH DAILY 90 tablet 1     lisinopril-hydrochlorothiazide (PRINZIDE/ZESTORETIC) 20-25 MG per tablet TAKE 1 TABLET EVERY DAY 90 tablet 1     pantoprazole (PROTONIX) 40 MG EC tablet Take 40 mg by mouth daily       Potassium Chloride ER 20 MEQ TBCR TAKE 1 TABLET EVERY DAY 90 tablet 1     rosuvastatin (CRESTOR) 5 MG tablet TAKE 1 TABLET EVERY DAY 90 tablet 1     verapamil HCl  MG CP24 TAKE 1 CAPSULE EVERY DAY 90 capsule 1     BP Readings from Last 3 Encounters:   07/11/18 139/73   04/19/18 144/78   04/05/18 141/72    Wt Readings from Last 3 Encounters:   07/11/18 154 lb 3.2 oz (69.9 kg)   04/19/18 148 lb (67.1 kg)   04/05/18 153 lb 9.6 oz (69.7 kg)                  Labs reviewed in EPIC    Reviewed and updated as needed this visit by clinical staff  Tobacco   Allergies  Meds  Problems  Med Hx  Surg Hx  Fam Hx  Soc Hx        Reviewed and updated as needed this visit by Provider  Allergies  Meds  Problems         ROS:  Constitutional, HEENT, cardiovascular, pulmonary, gi and gu systems are negative, except as otherwise noted.    OBJECTIVE:     /73  Pulse 100  Temp 97.9  F (36.6  C) (Oral)  Resp 18  Wt 154 lb 3.2 oz (69.9 kg)  SpO2 96%  BMI 30.62 kg/m2  Body mass index is 30.62 kg/(m^2).  GENERAL: healthy, alert and no distress  NECK: no adenopathy, no asymmetry, masses, or scars and thyroid normal to palpation  RESP: lungs clear to auscultation - no rales, rhonchi or wheezes  CV: regular rate and rhythm, normal S1 S2, no S3 or S4, no murmur, click or rub, no peripheral edema and peripheral pulses strong  MS: no gross musculoskeletal defects noted, no edema  SKIN: no suspicious lesions or rashes  PSYCH: mentation appears normal, tearful and anxious    Diagnostic Test Results:  none     ASSESSMENT/PLAN:     (I10) Hypertension goal BP (blood pressure) < 150/90  (primary encounter diagnosis)  Comment: to goal  Plan: BASIC METABOLIC PANEL, verapamil HCl  MG         CP24, lisinopril-hydrochlorothiazide         (PRINZIDE/ZESTORETIC) 20-25 MG per tablet         Refill x 6 months f/u 6 months for OV and labs - wellness exam    (E78.2) Mixed hyperlipidemia  Comment: stable  Plan: rosuvastatin (CRESTOR) 5 MG tablet         Refill x 6 months     (G45.9) Transient cerebral ischemia, unspecified type  Comment: no symptoms  Plan: clopidogrel (PLAVIX) 75 MG tablet         Refill x 6 months     (F41.9) Anxiety  Comment: worsening due to lack of sleep and stress of move  Plan: citalopram (CELEXA) 20 MG tablet, ALPRAZolam         (XANAX) 0.25 MG tablet        Mainly poor motivation to get out and do things, feels anxious about it.  Does not want to increase meds, recommend she use xanax for sleep as needed  Improve sleep and see if mood improves prior to med  changes      (G47.33) MANNY (obstructive sleep apnea)  (G47.00) Insomnia, unspecified type  Comment: refer to sleep for evaluation  Plan: SLEEP EVALUATION & MANAGEMENT REFERRAL - ADULT         -[facility] [place]          [phone] (Age 15 and up)        Needs this to help other health issues        See Patient Instructions    Deborah Terrell MD  [facility]

## 2018-07-11 ENCOUNTER — OFFICE VISIT (OUTPATIENT)
Dept: FAMILY MEDICINE | Facility: CLINIC | Age: 78
End: 2018-07-11
Payer: MEDICARE

## 2018-07-11 VITALS
OXYGEN SATURATION: 96 % | RESPIRATION RATE: 18 BRPM | WEIGHT: 154.2 LBS | HEART RATE: 100 BPM | BODY MASS INDEX: 30.62 KG/M2 | DIASTOLIC BLOOD PRESSURE: 73 MMHG | TEMPERATURE: 97.9 F | SYSTOLIC BLOOD PRESSURE: 139 MMHG

## 2018-07-11 DIAGNOSIS — I10 HYPERTENSION GOAL BP (BLOOD PRESSURE) < 150/90: Primary | ICD-10-CM

## 2018-07-11 DIAGNOSIS — E78.2 MIXED HYPERLIPIDEMIA: ICD-10-CM

## 2018-07-11 DIAGNOSIS — G47.33 OSA (OBSTRUCTIVE SLEEP APNEA): ICD-10-CM

## 2018-07-11 DIAGNOSIS — F41.9 ANXIETY: ICD-10-CM

## 2018-07-11 DIAGNOSIS — G47.00 INSOMNIA, UNSPECIFIED TYPE: ICD-10-CM

## 2018-07-11 DIAGNOSIS — G45.9 TRANSIENT CEREBRAL ISCHEMIA, UNSPECIFIED TYPE: ICD-10-CM

## 2018-07-11 LAB
ANION GAP SERPL CALCULATED.3IONS-SCNC: 9 MMOL/L (ref 3–14)
BUN SERPL-MCNC: 20 MG/DL (ref 7–30)
CALCIUM SERPL-MCNC: 9.6 MG/DL (ref 8.5–10.1)
CHLORIDE SERPL-SCNC: 103 MMOL/L (ref 94–109)
CO2 SERPL-SCNC: 29 MMOL/L (ref 20–32)
CREAT SERPL-MCNC: 0.95 MG/DL (ref 0.52–1.04)
GFR SERPL CREATININE-BSD FRML MDRD: 57 ML/MIN/1.7M2
GLUCOSE SERPL-MCNC: 81 MG/DL (ref 70–99)
POTASSIUM SERPL-SCNC: 4 MMOL/L (ref 3.4–5.3)
SODIUM SERPL-SCNC: 141 MMOL/L (ref 133–144)

## 2018-07-11 PROCEDURE — 80048 BASIC METABOLIC PNL TOTAL CA: CPT | Performed by: FAMILY MEDICINE

## 2018-07-11 PROCEDURE — 36415 COLL VENOUS BLD VENIPUNCTURE: CPT | Performed by: FAMILY MEDICINE

## 2018-07-11 PROCEDURE — 99214 OFFICE O/P EST MOD 30 MIN: CPT | Performed by: FAMILY MEDICINE

## 2018-07-11 RX ORDER — CLOPIDOGREL BISULFATE 75 MG/1
TABLET ORAL
Qty: 90 TABLET | Refills: 1 | Status: SHIPPED | OUTPATIENT
Start: 2018-07-11 | End: 2019-01-14

## 2018-07-11 RX ORDER — ALPRAZOLAM 0.25 MG
.25-.5 TABLET ORAL
Qty: 30 TABLET | Refills: 0 | Status: SHIPPED | OUTPATIENT
Start: 2018-07-11 | End: 2018-08-23

## 2018-07-11 RX ORDER — CITALOPRAM HYDROBROMIDE 20 MG/1
TABLET ORAL
Qty: 90 TABLET | Refills: 1 | Status: SHIPPED | OUTPATIENT
Start: 2018-07-11 | End: 2019-01-14

## 2018-07-11 RX ORDER — LISINOPRIL AND HYDROCHLOROTHIAZIDE 20; 25 MG/1; MG/1
TABLET ORAL
Qty: 90 TABLET | Refills: 1 | Status: SHIPPED | OUTPATIENT
Start: 2018-07-11 | End: 2019-01-14

## 2018-07-11 RX ORDER — ROSUVASTATIN CALCIUM 5 MG/1
TABLET, COATED ORAL
Qty: 90 TABLET | Refills: 1 | Status: SHIPPED | OUTPATIENT
Start: 2018-07-11 | End: 2019-01-14

## 2018-07-11 RX ORDER — VERAPAMIL HYDROCHLORIDE 360 MG/1
CAPSULE, DELAYED RELEASE PELLETS ORAL
Qty: 90 CAPSULE | Refills: 1 | Status: SHIPPED | OUTPATIENT
Start: 2018-07-11 | End: 2019-01-14

## 2018-07-11 ASSESSMENT — ANXIETY QUESTIONNAIRES
5. BEING SO RESTLESS THAT IT IS HARD TO SIT STILL: MORE THAN HALF THE DAYS
6. BECOMING EASILY ANNOYED OR IRRITABLE: SEVERAL DAYS
1. FEELING NERVOUS, ANXIOUS, OR ON EDGE: SEVERAL DAYS
2. NOT BEING ABLE TO STOP OR CONTROL WORRYING: NOT AT ALL
IF YOU CHECKED OFF ANY PROBLEMS ON THIS QUESTIONNAIRE, HOW DIFFICULT HAVE THESE PROBLEMS MADE IT FOR YOU TO DO YOUR WORK, TAKE CARE OF THINGS AT HOME, OR GET ALONG WITH OTHER PEOPLE: SOMEWHAT DIFFICULT
7. FEELING AFRAID AS IF SOMETHING AWFUL MIGHT HAPPEN: NOT AT ALL
3. WORRYING TOO MUCH ABOUT DIFFERENT THINGS: NOT AT ALL
GAD7 TOTAL SCORE: 7

## 2018-07-11 ASSESSMENT — PATIENT HEALTH QUESTIONNAIRE - PHQ9: 5. POOR APPETITE OR OVEREATING: NEARLY EVERY DAY

## 2018-07-11 NOTE — NURSING NOTE
"Chief Complaint   Patient presents with     Hypertension     Anxiety     Health Maintenance     orders pended.  Tdap       Initial /73  Pulse 100  Temp 97.9  F (36.6  C) (Oral)  Resp 18  Wt 154 lb 3.2 oz (69.9 kg)  SpO2 96%  BMI 30.62 kg/m2 Estimated body mass index is 30.62 kg/(m^2) as calculated from the following:    Height as of 4/19/18: 4' 11.5\" (1.511 m).    Weight as of this encounter: 154 lb 3.2 oz (69.9 kg).  Medication Reconciliation: complete  Maycol Gil CMA    "

## 2018-07-11 NOTE — MR AVS SNAPSHOT
After Visit Summary   7/11/2018    Swati Marinelli    MRN: [id number]           Patient Information     Date Of Birth          1940        Visit Information        Provider Department      7/11/2018 9:15 AM Deborah Terrell MD [facility]        Today's Diagnoses     Hypertension goal BP (blood pressure) < 150/90    -  1    Mixed hyperlipidemia        Transient cerebral ischemia, unspecified type        Anxiety        MANNY (obstructive sleep apnea)        Insomnia, unspecified type          Care Instructions      Make an appointment at the sleep center in [place] today.                Follow-ups after your visit        Additional Services     SLEEP EVALUATION & MANAGEMENT REFERRAL - [place]  [phone] (Age 15 and up)       Please be aware that coverage of these services is subject to the terms and limitations of your health insurance plan.  Call member services at your health plan with any benefit or coverage questions.      Please bring the following to your appointment:    >>   List of current medications   >>   This referral request   >>   Any documents/labs given to you for this referral                      Follow-up notes from your care team     Return in about 6 months (around 1/11/2019) for Physical Exam, Fasting Lab Work.      Your next 10 appointments already scheduled     Aug 23, 2018 11:00 AM CDT   New Visit with Melquiades Stephens MD   [facility] [place] ([facility] [place])    [address]  [place] [address] [phone]              Future tests that were ordered for you today     Open Future Orders        Priority Expected Expires Ordered    SLEEP EVALUATION & MANAGEMENT REFERRAL - [place]  [phone] (Age 15 and up) Routine  7/11/2019 7/11/2018            Who to contact     If you have questions or need follow up information about today's clinic visit or your  schedule please contact [facility] directly at [phone].  Normal or non-critical lab and imaging results will be communicated to you by MyChart, letter or phone within 4 business days after the clinic has received the results. If you do not hear from us within 7 days, please contact the clinic through [employer] or phone. If you have a critical or abnormal lab result, we will notify you by phone as soon as possible.  Submit refill requests through [email or web address] or call your pharmacy and they will forward the refill request to us. Please allow 3 business days for your refill to be completed.          Additional Information About Your Visit        [employer] Information     [email or web address] gives you secure access to your electronic health record. If you see a primary care provider, you can also send messages to your care team and make appointments. If you have questions, please call your primary care clinic.  If you do not have a primary care provider, please call [phone] and they will assist you.        Care EveryWhere ID     This is your Care EveryWhere ID. This could be used by other organizations to access your [facility] medical records  [id number]        Your Vitals Were     Pulse Temperature Respirations Pulse Oximetry BMI (Body Mass Index)       100 97.9  F (36.6  C) (Oral) 18 96% 30.62 kg/m2        Blood Pressure from Last 3 Encounters:   07/11/18 139/73   04/19/18 144/78   04/05/18 141/72    Weight from Last 3 Encounters:   07/11/18 154 lb 3.2 oz (69.9 kg)   04/19/18 148 lb (67.1 kg)   04/05/18 153 lb 9.6 oz (69.7 kg)              We Performed the Following     BASIC METABOLIC PANEL          Today's Medication Changes          These changes are accurate as of 7/11/18  9:39 AM.  If you have any questions, ask your nurse or doctor.               Stop taking these medicines if you haven't already. Please contact your care team if you have questions.     loratadine 10 MG tablet   Commonly known as:  CLARITIN    Stopped by:  Deborah Terrell MD                Where to get your medicines      These medications were sent to [facility] - [place], [place] - [address]  [address], [facility] [address]     Phone:  [phone]     citalopram 20 MG tablet    clopidogrel 75 MG tablet    lisinopril-hydrochlorothiazide 20-25 MG per tablet    rosuvastatin 5 MG tablet    verapamil HCl  MG Cp24         Some of these will need a paper prescription and others can be bought over the counter.  Ask your nurse if you have questions.     Bring a paper prescription for each of these medications     ALPRAZolam 0.25 MG tablet                Primary Care Provider Office Phone # Fax #    Deborah Terrell [phone] [address] [address] [facility] [address]        Equal Access to Services     [facility]: Hadii juanita souza hadasho Soomaali, waaxda luqadaha, qaybta kaalmada adeegyada, waxestevan shaffer . So [facility] [phone].    ATENCIÓN: Si habla español, tiene a apodaca disposición servicios gratuitos de asistencia lingüística. [facility] [phone].    We comply with applicable federal civil rights laws and [place] laws. We do not discriminate on the basis of race, color, national origin, age, disability, sex, sexual orientation, or gender identity.            Thank you!     Thank you for choosing [facility]  for your care. Our goal is always to provide you with excellent care. Hearing back from our patients is one way we can continue to improve our services. Please take a few minutes to complete the written survey that you may receive in the mail after your visit with us. Thank you!             Your Updated Medication List - Protect others around you: Learn how to safely use, store and throw away your medicines at www.disposemymeds.org.          This list is accurate as of 7/11/18  9:39 AM.  Always use your most recent med list.                    Brand Name Dispense Instructions for use Diagnosis    ALPRAZolam 0.25 MG tablet    XANAX    30 tablet    Take 1-2 tablets (0.25-0.5 mg) by mouth nightly as needed for sleep or anxiety    Anxiety       cetirizine 5 MG tablet    zyrTEC     Take 5 mg by mouth daily        citalopram 20 MG tablet    celeXA    90 tablet    TAKE 1 TABLET (20 MG) BY MOUTH DAILY    Anxiety       clopidogrel 75 MG tablet    PLAVIX    90 tablet    TAKE 1 TABLET (75 MG) BY MOUTH DAILY    Transient cerebral ischemia, unspecified type       lisinopril-hydrochlorothiazide 20-25 MG per tablet    PRINZIDE/ZESTORETIC    90 tablet    TAKE 1 TABLET EVERY DAY    Hypertension goal BP (blood pressure) < 150/90       Potassium Chloride ER 20 MEQ Tbcr     90 tablet    TAKE 1 TABLET EVERY DAY    Hypertension goal BP (blood pressure) < 150/90       PROTONIX 40 MG EC tablet   Generic drug:  pantoprazole      Take 40 mg by mouth daily        rosuvastatin 5 MG tablet    CRESTOR    90 tablet    TAKE 1 TABLET EVERY DAY    Mixed hyperlipidemia       verapamil HCl  MG Cp24     90 capsule    TAKE 1 CAPSULE EVERY DAY    Hypertension goal BP (blood pressure) < 150/90

## 2018-07-12 ENCOUNTER — ALLIED HEALTH/NURSE VISIT (OUTPATIENT)
Dept: NURSING | Facility: CLINIC | Age: 78
End: 2018-07-12
Payer: MEDICARE

## 2018-07-12 DIAGNOSIS — Z23 NEED FOR VACCINATION: Primary | ICD-10-CM

## 2018-07-12 PROCEDURE — 90715 TDAP VACCINE 7 YRS/> IM: CPT

## 2018-07-12 PROCEDURE — 90471 IMMUNIZATION ADMIN: CPT

## 2018-07-12 PROCEDURE — 99207 ZZC NO CHARGE LOS: CPT

## 2018-07-12 ASSESSMENT — ANXIETY QUESTIONNAIRES: GAD7 TOTAL SCORE: 7

## 2018-07-12 ASSESSMENT — PATIENT HEALTH QUESTIONNAIRE - PHQ9: SUM OF ALL RESPONSES TO PHQ QUESTIONS 1-9: 17

## 2018-07-12 NOTE — MR AVS SNAPSHOT
After Visit Summary   7/12/2018    Swati Marinelli    MRN: [id number]           Patient Information     Date Of Birth          1940        Visit Information        Provider Department      7/12/2018 12:15 PM AN ANCILLARY [facility]        Today's Diagnoses     Need for vaccination    -  1       Follow-ups after your visit        Your next 10 appointments already scheduled     Jul 31, 2018  2:40 PM CDT   New Sleep Patient with Kannan Medina MD   [facility]    [address] [address] [address]   [phone]            Aug 23, 2018 11:00 AM CDT   [facility]ades [facility]    [address] [id number] [phone]            Jan 14, 2019  9:00 AM CST   Office Visit with Deborah Terrell MD   [facility]    [address] [address] [phone]           Bring a current list of meds and any records pertaining to this visit. For Physicals, please bring immunization records and any forms needing to be filled out. Please arrive 10 minutes early to complete paperwork.              Future tests that were ordered for you today     Open Future Orders        Priority Expected Expires Ordered    SLEEP EVALUATION & MANAGEMENT [place]  [phone] (Age 15 and up) Routine  7/11/2019 7/11/2018            Who to contact     If you have questions or need follow up information about today's clinic visit or your schedule please contact [facility] directly at [phone].  Normal or non-critical lab and imaging results will be communicated to you by MyChart, letter or phone within 4 business days after the clinic has received the results. If you do not hear from us within 7 days, please contact the clinic through [employer]  or phone. If you have a critical or abnormal lab result, we will notify you by phone as soon as possible.  Submit refill requests through [employer] or call your pharmacy and they will forward the refill request to us. Please allow 3 business days for your refill to be completed.          Additional Information About Your Visit        "[employer]hart Information     [employer] gives you secure access to your electronic health record. If you see a primary care provider, you can also send messages to your care team and make appointments. If you have questions, please call your primary care clinic.  If you do not have a primary care provider, please call [phone] and they will assist you.        Care EveryWhere ID     This is your Care EveryWhere ID. This could be used by other organizations to access your [facility] medical records  [id number]         Blood Pressure from Last 3 Encounters:   07/11/18 139/73   04/19/18 144/78   04/05/18 141/72    Weight from Last 3 Encounters:   07/11/18 154 lb 3.2 oz (69.9 kg)   04/19/18 148 lb (67.1 kg)   04/05/18 153 lb 9.6 oz (69.7 kg)              We Performed the Following     1st  Administration  [50018]     TDAP VACCINE (ADACEL) [01276.002]        Primary Care Provider Office Phone # Fax #    Deborah Gisela Terrell [phone] [address] [facility] [address] [facility] [address]        Equal Access to Services     [facility] : Hadii aad ku hadasho Soomaali, waaxda luqadaha, qaybta kaalmada adeegyada, rishi shaffer . So [facility] [phone].    ATENCIÓN: Si habla español, tiene a apodaca disposición servicios gratuitos de asistencia lingüística. Llame al [phone].    We comply with applicable federal civil rights laws and [place] laws. We do not discriminate on the basis of race, color, national origin, age, disability, sex, sexual orientation, or gender identity.            Thank you!     Thank you for choosing [facility]  for your care.  Our goal is always to provide you with excellent care. Hearing back from our patients is one way we can continue to improve our services. Please take a few minutes to complete the written survey that you may receive in the mail after your visit with us. Thank you!             Your Updated Medication List - Protect others around you: Learn how to safely use, store and throw away your medicines at www.disposemymeds.org.          This list is accurate as of 7/12/18  2:56 PM.  Always use your most recent med list.                   Brand Name Dispense Instructions for use Diagnosis    ALPRAZolam 0.25 MG tablet    XANAX    30 tablet    Take 1-2 tablets (0.25-0.5 mg) by mouth nightly as needed for sleep or anxiety    Anxiety       cetirizine 5 MG tablet    zyrTEC     Take 5 mg by mouth daily        citalopram 20 MG tablet    celeXA    90 tablet    TAKE 1 TABLET (20 MG) BY MOUTH DAILY    Anxiety       clopidogrel 75 MG tablet    PLAVIX    90 tablet    TAKE 1 TABLET (75 MG) BY MOUTH DAILY    Transient cerebral ischemia, unspecified type       lisinopril-hydrochlorothiazide 20-25 MG per tablet    PRINZIDE/ZESTORETIC    90 tablet    TAKE 1 TABLET EVERY DAY    Hypertension goal BP (blood pressure) < 150/90       Potassium Chloride ER 20 MEQ Tbcr     90 tablet    TAKE 1 TABLET EVERY DAY    Hypertension goal BP (blood pressure) < 150/90       PROTONIX 40 MG EC tablet   Generic drug:  pantoprazole      Take 40 mg by mouth daily        rosuvastatin 5 MG tablet    CRESTOR    90 tablet    TAKE 1 TABLET EVERY DAY    Mixed hyperlipidemia       verapamil HCl  MG Cp24     90 capsule    TAKE 1 CAPSULE EVERY DAY    Hypertension goal BP (blood pressure) < 150/90

## 2018-07-12 NOTE — PROGRESS NOTES
Screening Questionnaire for Adult Immunization    Are you sick today?   No   Do you have allergies to medications, food, a vaccine component or latex?   No   Have you ever had a serious reaction after receiving a vaccination?   No   Do you have a long-term health problem with heart disease, lung disease, asthma, kidney disease, metabolic disease (e.g. diabetes), anemia, or other blood disorder?   No   Do you have cancer, leukemia, HIV/AIDS, or any other immune system problem?   No   In the past 3 months, have you taken medications that affect  your immune system, such as prednisone, other steroids, or anticancer drugs; drugs for the treatment of rheumatoid arthritis, Crohn s disease, or psoriasis; or have you had radiation treatments?   No   Have you had a seizure, or a brain or other nervous system problem?   No   During the past year, have you received a transfusion of blood or blood     products, or been given immune (gamma) globulin or antiviral drug?   No   For women: Are you pregnant or is there a chance you could become        pregnant during the next month?   No   Have you received any vaccinations in the past 4 weeks?   No     Immunization questionnaire answers were all negative.        Per orders of Dr. VILA, injection of TDAP given by Sheree Farris. Patient instructed to remain in clinic for 15 minutes afterwards, and to report any adverse reaction to me immediately.       Screening performed by Sheree Farris on 7/12/2018 at 2:55 PM.

## 2018-07-26 PROBLEM — F41.9 ANXIETY: Chronic | Status: ACTIVE | Noted: 2017-11-14

## 2018-07-26 PROBLEM — Z71.89 ADVANCED DIRECTIVES, COUNSELING/DISCUSSION: Chronic | Status: ACTIVE | Noted: 2017-11-14

## 2018-07-26 PROBLEM — E78.2 MIXED HYPERLIPIDEMIA: Chronic | Status: ACTIVE | Noted: 2017-11-14

## 2018-07-26 PROBLEM — I35.1 NONRHEUMATIC AORTIC VALVE INSUFFICIENCY: Chronic | Status: ACTIVE | Noted: 2017-11-14

## 2018-07-26 PROBLEM — K21.9 GASTROESOPHAGEAL REFLUX DISEASE WITHOUT ESOPHAGITIS: Status: ACTIVE | Noted: 2017-11-14

## 2018-07-26 PROBLEM — I10 HYPERTENSION GOAL BP (BLOOD PRESSURE) < 150/90: Chronic | Status: ACTIVE | Noted: 2017-11-14

## 2018-07-26 PROBLEM — Z71.89 ADVANCED DIRECTIVES, COUNSELING/DISCUSSION: Status: ACTIVE | Noted: 2017-11-14

## 2018-07-31 ENCOUNTER — OFFICE VISIT (OUTPATIENT)
Dept: SLEEP MEDICINE | Facility: CLINIC | Age: 78
End: 2018-07-31
Payer: MEDICARE

## 2018-07-31 VITALS
SYSTOLIC BLOOD PRESSURE: 114 MMHG | BODY MASS INDEX: 29.07 KG/M2 | HEIGHT: 61 IN | WEIGHT: 154 LBS | DIASTOLIC BLOOD PRESSURE: 64 MMHG | OXYGEN SATURATION: 94 % | HEART RATE: 90 BPM

## 2018-07-31 DIAGNOSIS — G47.00 INSOMNIA, UNSPECIFIED TYPE: ICD-10-CM

## 2018-07-31 DIAGNOSIS — G45.3 AMAUROSIS FUGAX: ICD-10-CM

## 2018-07-31 DIAGNOSIS — I10 HYPERTENSION GOAL BP (BLOOD PRESSURE) < 150/90: Chronic | ICD-10-CM

## 2018-07-31 DIAGNOSIS — G47.33 OSA (OBSTRUCTIVE SLEEP APNEA): Primary | ICD-10-CM

## 2018-07-31 PROBLEM — K21.9 GASTROESOPHAGEAL REFLUX DISEASE WITHOUT ESOPHAGITIS: Chronic | Status: ACTIVE | Noted: 2017-11-14

## 2018-07-31 PROCEDURE — 99204 OFFICE O/P NEW MOD 45 MIN: CPT | Performed by: INTERNAL MEDICINE

## 2018-07-31 NOTE — NURSING NOTE
"Chief Complaint   Patient presents with     Sleep Problem     consult- Feel I need to be evaluated for a sleep mask etc as I am getting worse without wearing one.        Initial /64  Pulse 90  Ht 1.537 m (5' 0.5\")  Wt 69.9 kg (154 lb)  SpO2 94%  BMI 29.58 kg/m2 Estimated body mass index is 29.58 kg/(m^2) as calculated from the following:    Height as of this encounter: 1.537 m (5' 0.5\").    Weight as of this encounter: 69.9 kg (154 lb).    Medication Reconciliation: complete    Neck circumference: 14.75 inches / 38 centimeters.        "

## 2018-07-31 NOTE — MR AVS SNAPSHOT
After Visit Summary   7/31/2018    Swati Marinelli    MRN: [id number]           Patient Information     Date Of Birth          1940        Visit Information        Provider Department      7/31/2018 2:40 PM Kannan Medina MD [facility]        Today's Diagnoses     MANNY (obstructive sleep apnea)    -  1    Insomnia, unspecified type        Amaurosis fugax        Hypertension goal BP (blood pressure) < 150/90        Advanced directives, counseling/discussion          Care Instructions      Your BMI is Body mass index is 29.58 kg/(m^2).  Weight management is a personal decision.  If you are interested in exploring weight loss strategies, the following discussion covers the approaches that may be successful. Body mass index (BMI) is one way to tell whether you are at a healthy weight, overweight, or obese. It measures your weight in relation to your height.  A BMI of 18.5 to 24.9 is in the healthy range. A person with a BMI of 25 to 29.9 is considered overweight, and someone with a BMI of 30 or greater is considered obese. More than two-thirds of American adults are considered overweight or obese.  Being overweight or obese increases the risk for further weight gain. Excess weight may lead to heart disease and diabetes.  Creating and following plans for healthy eating and physical activity may help you improve your health.  Weight control is part of healthy lifestyle and includes exercise, emotional health, and healthy eating habits. Careful eating habits lifelong are the mainstay of weight control. Though there are significant health benefits from weight loss, long-term weight loss with diet alone may be very difficult to achieve- studies show long-term success with dietary management in less than 10% of people. Attaining a healthy weight may be especially difficult to achieve in those with severe obesity. In some cases, medications, devices and surgical management might be considered.  What  can you do?  If you are overweight or obese and are interested in methods for weight loss, you should discuss this with your provider.     Consider reducing daily calorie intake by 500 calories.     Keep a food journal.     Avoiding skipping meals, consider cutting portions instead.    Diet combined with exercise helps maintain muscle while optimizing fat loss. Strength training is particularly important for building and maintaining muscle mass. Exercise helps reduce stress, increase energy, and improves fitness. Increasing exercise without diet control, however, may not burn enough calories to loose weight.       Start walking three days a week 10-20 minutes at a time    Work towards walking thirty minutes five days a week     Eventually, increase the speed of your walking for 1-2 minutes at time    In addition, we recommend that you review healthy lifestyles and methods for weight loss available through the National Institutes of Health patient information sites:  http://win.niddk.nih.gov/publications/index.htm    And look into health and wellness programs that may be available through your health insurance provider, employer, local community center, or ivett club.    Weight management plan: Patient was referred to their PCP to discuss a diet and exercise plan.              Follow-ups after your visit        Follow-up notes from your care team     Return in about 2 weeks (around 8/14/2018).      Your next 10 appointments already scheduled     Aug 17, 2018  8:00 PM CDT   PSG Diagnostic with BK BED 1   [facility] ([facility])    [address] [place] [id number] [phone]            Aug 23, 2018 11:00 AM CDT   New Visit with Melquiades Stephens MD   [facility] ([address] [address]   [phone]            Aug 29, 2018  2:20 PM CDT   Return Sleep Patient with Kannan Medina MD    [facility] ([facility])    [address] [address] [id number] [phone]            Jan 14, 2019  9:00 AM CST   [facility]    [address] [facility] [address] [phone]           Bring a current list of meds and any records pertaining to this visit. For Physicals, please bring immunization records and any forms needing to be filled out. Please arrive 10 minutes early to complete paperwork.              Future tests that were ordered for you today     Open Future Orders        Priority Expected Expires Ordered    Comprehensive Sleep Study Routine  1/27/2019 7/31/2018            Who to contact     If you have questions or need follow up information about today's clinic visit or your schedule please contact [facility] directly at [phone].  Normal or non-critical lab and imaging results will be communicated to you by [employer], letter or phone within 4 business days after the clinic has received the results. If you do not hear from us within 7 days, please contact the clinic through [employer] or phone. If you have a critical or abnormal lab result, we will notify you by phone as soon as possible.  Submit refill requests through [employer] or call your pharmacy and they will forward the refill request to us. Please allow 3 business days for your refill to be completed.          Additional Information About Your Visit        [employer] Information     [employer] gives you secure access to your electronic health record. If you see a primary care provider, you can also send messages to your care team and make appointments. If you have questions, please call your primary care clinic.  If you do not have a primary care provider, please call [phone] and they will assist you.        Care EveryWhere ID     This is your Care EveryWhere ID.  "This could be used by other organizations to access your [facility] medical records  [id number]        Your Vitals Were     Pulse Height Pulse Oximetry BMI (Body Mass Index)          90 1.537 m (5' 0.5\") 94% 29.58 kg/m2         Blood Pressure from Last 3 Encounters:   07/31/18 114/64   07/11/18 139/73   04/19/18 144/78    Weight from Last 3 Encounters:   07/31/18 69.9 kg (154 lb)   07/11/18 69.9 kg (154 lb 3.2 oz)   04/19/18 67.1 kg (148 lb)              We Performed the Following     SLEEP EVALUATION & MANAGEMENT REFERRAL - [facility] [place]  [phone] (Age 15 and up)     SLEEP EVALUATION & MANAGEMENT REFERRAL - [facility] [place]  [phone] (Age 15 and up)        Primary Care Provider Office Phone # Fax #    Deborah Terrell [phone] [address] [facility] [address]        Equal Access to Services     [facility]: Hadii aad ku hadasho Soomaali, waaxda luqadaha, qaybta kaalmada adeegyada, rishi shaffer . So [facility] [phone].    ATENCIÓN: Si habla español, tiene a apodaca disposición servicios gratuitos de asistencia lingüística. Llame al [phone].    We comply with applicable federal civil rights laws and [place] laws. We do not discriminate on the basis of race, color, national origin, age, disability, sex, sexual orientation, or gender identity.            Thank you!     Thank you for choosing [facility]  for your care. Our goal is always to provide you with excellent care. Hearing back from our patients is one way we can continue to improve our services. Please take a few minutes to complete the written survey that you may receive in the mail after your visit with us. Thank you!             Your Updated Medication List - Protect others around you: Learn how to safely use, store and throw away your medicines at www.disposemymeds.org.          This list is accurate as of " 7/31/18  3:39 PM.  Always use your most recent med list.                   Brand Name Dispense Instructions for use Diagnosis    ALPRAZolam 0.25 MG tablet    XANAX    30 tablet    Take 1-2 tablets (0.25-0.5 mg) by mouth nightly as needed for sleep or anxiety    Anxiety       cetirizine 5 MG tablet    zyrTEC     Take 5 mg by mouth daily        citalopram 20 MG tablet    celeXA    90 tablet    TAKE 1 TABLET (20 MG) BY MOUTH DAILY    Anxiety       clopidogrel 75 MG tablet    PLAVIX    90 tablet    TAKE 1 TABLET (75 MG) BY MOUTH DAILY    Transient cerebral ischemia, unspecified type       lisinopril-hydrochlorothiazide 20-25 MG per tablet    PRINZIDE/ZESTORETIC    90 tablet    TAKE 1 TABLET EVERY DAY    Hypertension goal BP (blood pressure) < 150/90       Potassium Chloride ER 20 MEQ Tbcr     90 tablet    TAKE 1 TABLET EVERY DAY    Hypertension goal BP (blood pressure) < 150/90       PROTONIX 40 MG EC tablet   Generic drug:  pantoprazole      Take 40 mg by mouth daily        rosuvastatin 5 MG tablet    CRESTOR    90 tablet    TAKE 1 TABLET EVERY DAY    Mixed hyperlipidemia       verapamil HCl  MG Cp24     90 capsule    TAKE 1 CAPSULE EVERY DAY    Hypertension goal BP (blood pressure) < 150/90

## 2018-07-31 NOTE — PROGRESS NOTES
Sleep Consultation:    Date on this visit: 7/31/2018    Swati Marinelli is sent by Deborah Terrell for a sleep consultation regarding insomnia, obstructive sleep apnea .    Primary Physician: Deborah Terrell     Chief Complaint   Patient presents with     Sleep Problem     consult- Feel I need to be evaluated for a sleep mask etc as I am getting worse without wearing one.      She carries a diagnosis of obstructive sleep apnea from a sleep center in [place]. No records that are available definitively support that. Only titration studies are available that I can discern and clinic notes do not discuss sleep study results. The earlest study available was from 2010,She has been off of CPAP for 2 years.     She is concerned about gasping at night. This happens 5-10 times a night. It also happens during the day, but less frequently 1-2/day while at rest. Review of records suggest that she had persistant problems with this when she was on PAP.     She is also tired during the day and could 'sleep anytime'. When on PAP it appears from record that she also had this problems. ESS today was 4, rescored 16. She also has fatigue. She has been sleepy for 5-10 years or more.      Swati goes to bed at 9-10 PM during the week. She gest up at 730-9 AM without an alarm. She usually falls asleep in 10-15 minutes.  Swati has difficulty falling asleep 1/week taking 30-60 minutes. She has does not have troubles turning her brain off, but later states she does think about things. She wakes up 1-2 times a night without difficulty falling back to sleep.  Swati wakes up to go to the bathroom.  On weekends, schedule is similar.  Patient gets an average of >9 hours of sleep per night.     Patient does not use electronics in bed and watch TV in bed.     Swati does snore.  She does have witnessed apneas and gasping. They never sleep separately.  Patient sleeps on her side and stomach. She denies no morning headaches and restless legs.     Swati  has occasional sleep talking and denies any sleep walking, sleep paralysis, cataplexy and hypnogogic/hypnopompic hallucinations.    Patient describes themself as a morning person.      Swati naps 1 times per day for 30-60 minutes in afternoon. She takes frequent inadvertant naps (at least daily).  She denies dozing while driving. She uses 0-1 cups/day of coffee, 0-1 sodas/day.     She takes no xanax. She takes zyrtec daily. She has been on celexa since at least 2014 (as far back as old records go that I can see)    She has lots of aches and pains.    Lab Results   Component Value Date    TSH 2.68 04/19/2018     CBC RESULTS:   Recent Labs   Lab Test  04/19/18   1105   WBC  10.5   RBC  5.05   HGB  15.2   HCT  45.4   MCV  90   MCH  30.1   MCHC  33.5   RDW  14.9   PLT  235     Recent Labs   Lab Test  07/11/18   0944  01/03/18   0841   NA  141  141   POTASSIUM  4.0  3.6   CHLORIDE  103  102   CO2  29  28   ANIONGAP  9  11   GLC  81  88   BUN  20  19   CR  0.95  0.91   YOVANI  9.6  9.4     Liver Function Studies -   Recent Labs   Lab Test  04/19/18   1105   PROTTOTAL  7.1   ALBUMIN  3.7   BILITOTAL  0.4   ALKPHOS  61   AST  16   ALT  25            Allergies:    Allergies   Allergen Reactions     Aleve [Naproxen]      Asa [Aspirin]      Codeine      Doxycycline      Elavil [Amitriptyline]      Etodolac      Gabapentin      Lyrica [Pregabalin]      Mushroom      Oxycodone      Oxycodone-Acetaminophen      Vicodin [Hydrocodone-Acetaminophen]        Medications:    Current Outpatient Prescriptions   Medication Sig Dispense Refill     ALPRAZolam (XANAX) 0.25 MG tablet Take 1-2 tablets (0.25-0.5 mg) by mouth nightly as needed for sleep or anxiety 30 tablet 0     cetirizine (ZYRTEC) 5 MG tablet Take 5 mg by mouth daily       citalopram (CELEXA) 20 MG tablet TAKE 1 TABLET (20 MG) BY MOUTH DAILY 90 tablet 1     clopidogrel (PLAVIX) 75 MG tablet TAKE 1 TABLET (75 MG) BY MOUTH DAILY 90 tablet 1     lisinopril-hydrochlorothiazide  (PRINZIDE/ZESTORETIC) 20-25 MG per tablet TAKE 1 TABLET EVERY DAY 90 tablet 1     pantoprazole (PROTONIX) 40 MG EC tablet Take 40 mg by mouth daily       Potassium Chloride ER 20 MEQ TBCR TAKE 1 TABLET EVERY DAY 90 tablet 1     rosuvastatin (CRESTOR) 5 MG tablet TAKE 1 TABLET EVERY DAY 90 tablet 1     verapamil HCl  MG CP24 TAKE 1 CAPSULE EVERY DAY 90 capsule 1       Problem List:  Patient Active Problem List    Diagnosis Date Noted     Hypertension goal BP (blood pressure) < 150/90 11/14/2017     Priority: Medium     Neurocardiogenic syncope 11/14/2017     Priority: Medium     Mixed hyperlipidemia 11/14/2017     Priority: Medium     MANNY (obstructive sleep apnea) 11/14/2017     Priority: Medium     Nonrheumatic aortic valve insufficiency 11/14/2017     Priority: Medium     Moderate AI by echo 4/16       Anxiety 11/14/2017     Priority: Medium     Advanced directives, counseling/discussion 11/14/2017     Priority: Low     Discussed Advance Directive planning with patient; information given to patient to review       Gastroesophageal reflux disease without esophagitis 11/14/2017     Priority: Low        Past Medical/Surgical History:  Past Medical History:   Diagnosis Date     TIA (transient ischemic attack) 2010    Possible TIA approximately 2010. Occular symptoms in left eye, likely occipital      Past Surgical History:   Procedure Laterality Date     APPENDECTOMY OPEN CHILD  1951     C TOTAL KNEE ARTHROPLASTY Left 2011     C TOTAL KNEE ARTHROPLASTY Left 2013    revision     CARPAL TUNNEL RELEASE RT/LT Right 1995 1990s     CATARACT IOL, RT/LT  2009    right 2/4/09, left 2/21/09     CHOLECYSTECTOMY  06/2012     COLONOSCOPY  2012    normal     MOHS MICROGRAPHIC PROCEDURE  2011    back - 2011, lip and back 5/2012     ROTATOR CUFF REPAIR RT/LT Right 2009     ROTATOR CUFF REPAIR RT/LT Left 04/20/2016     TONSILLECTOMY  1945       Social History:  Social History     Social History     Marital status:       "Spouse name: N/A     Number of children: N/A     Years of education: N/A     Occupational History     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,  Retired     Social History Main Topics     Smoking status: Never Smoker     Smokeless tobacco: Never Used     Alcohol use No     Drug use: No     Sexual activity: No     Other Topics Concern     Not on file     Social History Narrative       Family History:  Family History   Problem Relation Age of Onset     Tuberculosis Mother      Myocardial Infarction Father      Coronary Artery Disease Father      Myocardial Infarction Brother      Coronary Artery Disease Brother      Heart Surgery Daughter      Diabetes No family hx of      Colon Cancer No family hx of      Breast Cancer No family hx of        Review of Systems:  A complete review of systems reviewed by me is negative with the exeption of what has been mentioned in the history of present illness.  CONSTITUTIONAL:  NEGATIVE for  night sweats  EYES:  POSITIVE for changes in vision and blind spots  ENT:  POSITIVE for  ear pain, post-nasal drip and runny nose  CARDIAC:  POSITIVE for  breathlessness when lying flat  NEUROLOGIC: NEGATIVE headaches, weakness or numbness in the arms or legs.  DERMATOLOGIC:  POSITIVE for  rashes  PULMONARY:  POSITIVE for  SOB at rest, SOB with activity, dry cough and wheezing   GASTROINTESTINAL:  POSITIVE for  loose or watery stools and constipation  GENITOURINARY: NEGATIVE for pain during urination, blood in urine, urinating more frequently than usual, irregular menstrual periods.  MUSCULOSKELETAL:  POSITIVE for  muscle pain, bone or joint pain and swollen joints  ENDOCRINE: NEGATIVE for increased thirst or urination, diabetes.  LYMPHATIC: NEGATIVE for swollen lymph nodes, lumps or bumps in the breasts or nipple discharge.    Physical Examination:  Vitals: /64  Pulse 90  Ht 1.537 m (5' 0.5\")  Wt 69.9 kg (154 lb)  SpO2 94%  BMI 29.58 kg/m2  BMI= Body mass index is 29.58 " kg/(m^2).    Neck Cir (cm): 38 cm    Han[place] Total Score 7/31/2018   Total score - [place] 16       CONSTANZA Total Score: 17 (07/31/18 1438)    GENERAL APPEARANCE: healthy, alert and no distress  EYES: Eyes grossly normal to inspection and conjunctivae and sclerae normal  HENT: ear canals and TM's normal, nose and mouth without ulcers or lesions and oropharynx crowded  NECK: no adenopathy, no asymmetry, masses, or scars and thyroid normal to palpation  RESP: lungs clear to auscultation - no rales, rhonchi or wheezes  CV: regular rates and rhythm, normal S1 S2, no S3 or S4 and no murmur, click or rub  ABDOMEN: soft, nontender, without hepatosplenomegaly or masses and bowel sounds normal  MS: extremities normal- no gross deformities noted  NEURO: Normal strength and tone, mentation intact, speech normal and cranial nerves 2-12 intact  PSYCH: mentation appears normal and affect normal/bright  Mallampati Class: IV.  Tonsillar Stage: 0  surgically removed.    Impression/Plan:    Excessive daytime sleepiness, 'gasping' (nocturnal>daytime)  Differential diagnosis: sleep disruption/obstructive sleep apnea, medications (celeca, zyrtec may be implicated), narcolepsy/ idiopathic hypersomnolence    Recommended she discuss with her PCP a taper off of celexa and a trial off of zyrtec.     Recommended all-night diagnostic Polysomnogram (using 4% desaturation/Medicare/2012 AASM 1B scoring rules) for re-evaluation of possible obstructive sleep apnea.      She will follow up with me in approximately two weeks after her sleep study has been competed to review the results and discuss plan of care.         Kannan Medina     CC: Deborah Terrell

## 2018-07-31 NOTE — PATIENT INSTRUCTIONS

## 2018-08-17 ENCOUNTER — THERAPY VISIT (OUTPATIENT)
Dept: SLEEP MEDICINE | Facility: CLINIC | Age: 78
End: 2018-08-17
Payer: MEDICARE

## 2018-08-17 DIAGNOSIS — G47.33 OSA (OBSTRUCTIVE SLEEP APNEA): ICD-10-CM

## 2018-08-17 DIAGNOSIS — G45.3 AMAUROSIS FUGAX: ICD-10-CM

## 2018-08-17 DIAGNOSIS — G47.00 INSOMNIA, UNSPECIFIED TYPE: ICD-10-CM

## 2018-08-17 DIAGNOSIS — I10 HYPERTENSION GOAL BP (BLOOD PRESSURE) < 150/90: Chronic | ICD-10-CM

## 2018-08-17 PROCEDURE — 95810 POLYSOM 6/> YRS 4/> PARAM: CPT | Performed by: INTERNAL MEDICINE

## 2018-08-17 NOTE — MR AVS SNAPSHOT
After Visit Summary   8/17/2018    Swati Marinelli    MRN: [id number]           Patient Information     Date Of Birth          1940        Visit Information        Provider Department      8/17/2018 8:00 PM [facility] BED 1 [facility]        Today's Diagnoses     MANNY (obstructive sleep apnea)        Insomnia, unspecified type        Amaurosis fugax        Hypertension goal BP (blood pressure) < 150/90           Follow-ups after your visit        Your next 10 appointments already scheduled     Aug 23, 2018 11:00 AM CDT   New Visit with Melquiades Stephens MD   [facility]    [address] [place] [id number]   [phone]            Aug 29, 2018  2:20 PM CDT   Return Sleep Patient with Kannan Medina MD   [facility] ([facility])    [address] [place] [id number] [phone]            Jan 14, 2019  9:00 AM CST   [facility] Visit with [facility]rell MD   [facility]    [address] [facility] [address] [phone]           Bring a current list of meds and any records pertaining to this visit. For Physicals, please bring immunization records and any forms needing to be filled out. Please arrive 10 minutes early to complete paperwork.              Who to contact     If you have questions or need follow up information about today's clinic visit or your schedule please contact [facility] directly at [phone].  Normal or non-critical lab and imaging results will be communicated to you by MyChart, letter or phone within 4 business days after the clinic has received the results. If you do not hear from us within 7 days, please contact the clinic through [employer] or phone. If you have a critical or abnormal lab result, we will notify you by phone as soon as possible.  Submit refill requests through [employer] or  call your pharmacy and they will forward the refill request to us. Please allow 3 business days for your refill to be completed.          Additional Information About Your Visit        [employer] Information     [employer] gives you secure access to your electronic health record. If you see a primary care provider, you can also send messages to your care team and make appointments. If you have questions, please call your primary care clinic.  If you do not have a primary care provider, please call [phone] and they will assist you.        Care EveryWhere ID     This is your Care EveryWhere ID. This could be used by other organizations to access your [place] medical records  [id number]         Blood Pressure from Last 3 Encounters:   07/31/18 114/64   07/11/18 139/73   04/19/18 144/78    Weight from Last 3 Encounters:   07/31/18 69.9 kg (154 lb)   07/11/18 69.9 kg (154 lb 3.2 oz)   04/19/18 67.1 kg (148 lb)              We Performed the Following     Comprehensive Sleep Study        Primary Care Provider Office Phone # Fax #    Deborah Terrell [phone] [address] [facility] [address]        Equal Access to Services     ALICAI BALDWIN : Hadii aad ku hadasho Soadry, waaxda luqadaha, qaybta kaalmada ademeredithyada, rishi grady. So [facility] [phone].    ATENCIÓN: Si habla español, tiene a apodaca disposición servicios gratuitos de asistencia lingüística. [facility] [phone].    We comply with applicable federal civil rights laws and [place] laws. We do not discriminate on the basis of race, color, national origin, age, disability, sex, sexual orientation, or gender identity.            Thank you!     Thank you for choosing [facility]  for your care. Our goal is always to provide you with excellent care. Hearing back from our patients is one way we can continue to improve our services. Please take a few minutes to complete the written survey  that you may receive in the mail after your visit with us. Thank you!             Your Updated Medication List - Protect others around you: Learn how to safely use, store and throw away your medicines at www.disposemymeds.org.          This list is accurate as of 8/17/18 11:59 PM.  Always use your most recent med list.                   Brand Name Dispense Instructions for use Diagnosis    ALPRAZolam 0.25 MG tablet    XANAX    30 tablet    Take 1-2 tablets (0.25-0.5 mg) by mouth nightly as needed for sleep or anxiety    Anxiety       cetirizine 5 MG tablet    zyrTEC     Take 5 mg by mouth daily        citalopram 20 MG tablet    celeXA    90 tablet    TAKE 1 TABLET (20 MG) BY MOUTH DAILY    Anxiety       clopidogrel 75 MG tablet    PLAVIX    90 tablet    TAKE 1 TABLET (75 MG) BY MOUTH DAILY    Transient cerebral ischemia, unspecified type       lisinopril-hydrochlorothiazide 20-25 MG per tablet    PRINZIDE/ZESTORETIC    90 tablet    TAKE 1 TABLET EVERY DAY    Hypertension goal BP (blood pressure) < 150/90       Potassium Chloride ER 20 MEQ Tbcr     90 tablet    TAKE 1 TABLET EVERY DAY    Hypertension goal BP (blood pressure) < 150/90       PROTONIX 40 MG EC tablet   Generic drug:  pantoprazole      Take 40 mg by mouth daily        rosuvastatin 5 MG tablet    CRESTOR    90 tablet    TAKE 1 TABLET EVERY DAY    Mixed hyperlipidemia       verapamil HCl  MG Cp24     90 capsule    TAKE 1 CAPSULE EVERY DAY    Hypertension goal BP (blood pressure) < 150/90

## 2018-08-18 NOTE — PROGRESS NOTES
Arrived [facility] sleep      Diagnostic PSG (only - per order)completed per provider order.  Patient did not meet criteria for PAP therapy.

## 2018-08-20 ENCOUNTER — TELEPHONE (OUTPATIENT)
Dept: ORTHOPEDICS | Facility: CLINIC | Age: 78
End: 2018-08-20

## 2018-08-20 PROBLEM — G47.33 OSA (OBSTRUCTIVE SLEEP APNEA): Chronic | Status: ACTIVE | Noted: 2017-11-14

## 2018-08-20 NOTE — PROCEDURES
SLEEP STUDY INTERPRETATION  DIAGNOSTIC POLYSOMNOGRAPHY REPORT      Patient: TRISTEN JUNE  YOB: 1940  Study Date: 8/17/2018  MRN: [id number]  Referring Provider: Ke Ochoa PA-C  Ordering Provider: Dulce Suárez PA-C    Indications for Polysomnography: The patient is a 78 y old Female who is 5' and weighs 154.0 lbs. Her BMI is 29.8, [place] sleepiness scale 16. A diagnostic polysomnogram was performed to evaluate for excessive daytime sleepiness, 'gasping' (nocturnal>daytime)       Polysomnogram Data: A full night polysomnogram recorded the standard physiologic parameters including EEG, EOG, EMG, ECG, nasal and oral airflow. Respiratory parameters of chest and abdominal movements were recorded with respiratory inductance plethysmography. Oxygen saturation was recorded by pulse oximetry. Hypopnea scoring rule used: 1B 4%.        Sleep Architecture:   The total recording time of the polysomnogram was 531.6 minutes. The total sleep time was 368.5 minutes. Sleep latency was increased at 67.3 minutes without the use of a sleep aid. REM latency was 202.5 minutes. Arousal index was increased at 83.7 arousals per hour. Sleep efficiency was decreased at 69.3%. Wake after sleep onset was 77.0 minutes. The patient spent 8.0% of total sleep time in Stage N1, 69.3% in Stage N2, 5.2% in Stage N3, and 17.5% in REM. Time supine was 0 minutes.      Respiration: It was frequently difficult t discern between RERAs and PLMs    Events ? The polysomnogram revealed a presence of 66 obstructive, 3 central, and 0 mixed apneas resulting in an apnea index of 11.2 events per hour. There were 53 obstructive hypopneas and 0 central hypopneas resulting in an obstructive hypopnea index of 8.6 and central hypopnea index of 0 events per hour. The combined apnea/hypopnea index was 19.9 events per hour (central apnea/hypopnea index was 0.5 events per hour). The REM AHI was 74.4 events per hour. The supine AHI was n/a. The RERA index  was 29.1 events per hour.  The RDI was 49.0 events per hour.    Snoring - was reported as mild to very loud.    Respiratory rate and pattern - was notable for normal respiratory rate and pattern.    Sustained Sleep Associated Hypoventilation - Transcutaneous carbon dioxide monitoring was not used, however significant hypoventilation was not suggested by oximetry    Sleep Associated Hypoxemia - (Greater than 5 minutes O2 sat at or below 88%) was present. Baseline oxygen saturation was 91.6%. Lowest oxygen saturation was 72.6%. Time spent less than or equal to 88% was 27.0 minutes. Time spent less than or equal to 89% was 36.1 minutes.    Movement Activity:     Periodic Limb Activity - There were 576 PLMs during the entire study. The PLM index was 93.8 movements per hour. The PLM Arousal Index was 40.7 per hour.    REM EMG Activity - Excessive transient/sustained muscle activity was not present.    Nocturnal Behavior - Abnormal sleep related behaviors were not noted     Bruxism - None apparent.      Cardiac Summary:   The average pulse rate was 76.3 bpm. The minimum pulse rate was 63.1 bpm while the maximum pulse rate was 99.4 bpm.  Arrhythmias were not noted.        Assessment:     Moderate obstructive sleep apnea in the lateral position (severe by RDI), principally REM-related when it associated  with sleep-related hypoxemia    Frequent periodic limb movements of sleep    Recommendations:    Due to difficulty discerning between PLMs and RERAs would recommend repeat polysomnography with full night titration of positive airway pressure therapy for the control of sleep disordered breathing.    Treatment could be empirically initiated with Auto?titrating PAP therapy with a range of 5 to 18 cmH2O. Recommend clinical follow up with sleep management team.    Patient may be a candidate for dental appliance through referral to Sleep Dentistry for the treatment of obstructive sleep apnea and/or socially disruptive  snoring.    If devices are not acceptable or effective, patient may benefit from evaluation of possible surgical options. If she is interested, would recommend referral to specialized ENT-Sleep provider.    Advice regarding the risks of drowsy driving.    Suggest optimizing sleep schedule and avoiding sleep deprivation.    Weight management.    If symptoms persist depite adequate treatment of MANNY could consider a trial of Pharmacologic therapy for periodic limb movements.    Diagnostic Codes:   Obstructive Sleep Apnea G47.33  Sleep Hypoxemia/Hypoventilation G47.36      _____________________________________   Electronically Signed By: Kannan Medina MD 8/20/18           Range(%) Time in range (min)   0.0 - 89.0 36.1   0.0 - 88.0 27.0         Stage Min(mm Hg) Max(mm Hg)   Wake - -   NREM(1+2+3) - -   REM - -       Range(mmHg) Time in range (min)   55.0 - 100.0 -   Excluded data <20.0 & >65.0 532.0

## 2018-08-21 LAB — SLPCOMP: NORMAL

## 2018-08-23 ENCOUNTER — RADIANT APPOINTMENT (OUTPATIENT)
Dept: GENERAL RADIOLOGY | Facility: CLINIC | Age: 78
End: 2018-08-23
Attending: ORTHOPAEDIC SURGERY
Payer: MEDICARE

## 2018-08-23 ENCOUNTER — OFFICE VISIT (OUTPATIENT)
Dept: ORTHOPEDICS | Facility: CLINIC | Age: 78
End: 2018-08-23
Payer: MEDICARE

## 2018-08-23 ENCOUNTER — OFFICE VISIT (OUTPATIENT)
Dept: RHEUMATOLOGY | Facility: CLINIC | Age: 78
End: 2018-08-23
Payer: MEDICARE

## 2018-08-23 VITALS
WEIGHT: 152.2 LBS | RESPIRATION RATE: 16 BRPM | OXYGEN SATURATION: 92 % | HEART RATE: 90 BPM | BODY MASS INDEX: 28.74 KG/M2 | HEIGHT: 61 IN | DIASTOLIC BLOOD PRESSURE: 65 MMHG | SYSTOLIC BLOOD PRESSURE: 111 MMHG

## 2018-08-23 VITALS
DIASTOLIC BLOOD PRESSURE: 64 MMHG | WEIGHT: 150 LBS | HEART RATE: 74 BPM | BODY MASS INDEX: 28.81 KG/M2 | TEMPERATURE: 99.8 F | SYSTOLIC BLOOD PRESSURE: 134 MMHG

## 2018-08-23 DIAGNOSIS — M70.62 TROCHANTERIC BURSITIS OF BOTH HIPS: Primary | ICD-10-CM

## 2018-08-23 DIAGNOSIS — R76.8 ANA POSITIVE: Primary | ICD-10-CM

## 2018-08-23 DIAGNOSIS — M70.61 TROCHANTERIC BURSITIS OF BOTH HIPS: Primary | ICD-10-CM

## 2018-08-23 DIAGNOSIS — M25.551 LATERAL PAIN OF RIGHT HIP: ICD-10-CM

## 2018-08-23 DIAGNOSIS — M35.00 SICCA SYNDROME (H): ICD-10-CM

## 2018-08-23 DIAGNOSIS — M16.0 PRIMARY OSTEOARTHRITIS OF BOTH HIPS: ICD-10-CM

## 2018-08-23 DIAGNOSIS — M15.0 PRIMARY OSTEOARTHRITIS INVOLVING MULTIPLE JOINTS: ICD-10-CM

## 2018-08-23 PROCEDURE — 99213 OFFICE O/P EST LOW 20 MIN: CPT | Mod: 25 | Performed by: ORTHOPAEDIC SURGERY

## 2018-08-23 PROCEDURE — 99204 OFFICE O/P NEW MOD 45 MIN: CPT | Performed by: INTERNAL MEDICINE

## 2018-08-23 PROCEDURE — 73502 X-RAY EXAM HIP UNI 2-3 VIEWS: CPT | Mod: FY

## 2018-08-23 PROCEDURE — 20610 DRAIN/INJ JOINT/BURSA W/O US: CPT | Mod: 50 | Performed by: ORTHOPAEDIC SURGERY

## 2018-08-23 RX ORDER — LIDOCAINE HYDROCHLORIDE 10 MG/ML
2 INJECTION, SOLUTION INFILTRATION; PERINEURAL
Status: DISCONTINUED | OUTPATIENT
Start: 2018-08-23 | End: 2019-07-24

## 2018-08-23 RX ORDER — METHYLPREDNISOLONE ACETATE 80 MG/ML
80 INJECTION, SUSPENSION INTRA-ARTICULAR; INTRALESIONAL; INTRAMUSCULAR; SOFT TISSUE
Status: DISCONTINUED | OUTPATIENT
Start: 2018-08-23 | End: 2019-07-24

## 2018-08-23 RX ADMIN — METHYLPREDNISOLONE ACETATE 80 MG: 80 INJECTION, SUSPENSION INTRA-ARTICULAR; INTRALESIONAL; INTRAMUSCULAR; SOFT TISSUE at 09:42

## 2018-08-23 RX ADMIN — LIDOCAINE HYDROCHLORIDE 2 ML: 10 INJECTION, SOLUTION INFILTRATION; PERINEURAL at 09:42

## 2018-08-23 ASSESSMENT — PAIN SCALES - GENERAL: PAINLEVEL: EXTREME PAIN (8)

## 2018-08-23 NOTE — MR AVS SNAPSHOT
After Visit Summary   8/23/2018    Swati Marinelli    MRN: [id number]           Patient Information     Date Of Birth          1940        Visit Information        Provider Department      8/23/2018 11:00 AM Melquiades Stephens MD [facility]        Care Instructions    Tylenol as needed  Capsaicin cream as needed.    Rheumatology    Dr. Melquiades Hendrickson [facility]   (Monday)  [address]  [place] [address]       [facility]   (Tuesday)  [address] [place] [address]    [facility]   (Wed., Thurs., and Friday)  [address] [place] [address]    Phone number: [phone]  Thank you for choosing [facility].  Radha Olmstead CMA            Follow-ups after your visit        Your next 10 appointments already scheduled     Aug 29, 2018  2:20 PM CDT   Return Sleep Patient with Kannan Medina MD   [facility] [facility] ([facility] [place])    [address] [address] [phone]            Jan 14, 2019  9:00 AM CST   Office Visit with Deborah [facility] MD   [facility]    [address] [facility] [address] [phone]           Bring a current list of meds and any records pertaining to this visit. For Physicals, please bring immunization records and any forms needing to be filled out. Please arrive 10 minutes early to complete paperwork.              Who to contact     If you have questions or need follow up information about today's clinic visit or your schedule please contact [facility] directly at [phone].  Normal or non-critical lab and imaging results will be communicated to you by MyChart, letter or phone within 4 business days after the clinic has received the results. If you do not hear from us within 7 days, please contact the clinic through MyChart or phone. If you have a critical or abnormal lab result,  "we will notify you by phone as soon as possible.  Submit refill requests through [employer] or call your pharmacy and they will forward the refill request to us. Please allow 3 business days for your refill to be completed.          Additional Information About Your Visit        [employer] Information     [employer] gives you secure access to your electronic health record. If you see a primary care provider, you can also send messages to your care team and make appointments. If you have questions, please call your primary care clinic.  If you do not have a primary care provider, please call [phone] and they will assist you.        Care EveryWhere ID     This is your Care EveryWhere ID. This could be used by other organizations to access your [place] medical records  [id number]        Your Vitals Were     Pulse Respirations Height Pulse Oximetry BMI (Body Mass Index)       90 16 1.537 m (5' 0.5\") 92% 29.24 kg/m2        Blood Pressure from Last 3 Encounters:   08/23/18 111/65   08/23/18 134/64   07/31/18 114/64    Weight from Last 3 Encounters:   08/23/18 69 kg (152 lb 3.2 oz)   08/23/18 68 kg (150 lb)   07/31/18 69.9 kg (154 lb)              Today, you had the following     No orders found for display       Primary Care Provider Office Phone # Fax #    Deborah Terrell [phone] [address] [facility] [address]        Equal Access to Services     ALICIA BALDWIN AH: Hadii aad ku hadasho Soomaali, waaxda luqadaha, qaybta kaalmada adeegyada, rishi grady. So [facility] [phone].    ATENCIÓN: Si habla español, tiene a apodaca disposición servicios gratuitos de asistencia lingüística. Llame al [phone].    We comply with applicable federal civil rights laws and [place] laws. We do not discriminate on the basis of race, color, national origin, age, disability, sex, sexual orientation, or gender identity.            Thank you!     Thank you for choosing [facility]  for your care. Our goal is always to provide you with excellent care. Hearing back from our patients is one way we can continue to improve our services. Please take a few minutes to complete the written survey that you may receive in the mail after your visit with us. Thank you!             Your Updated Medication List - Protect others around you: Learn how to safely use, store and throw away your medicines at www.disposemymeds.org.          This list is accurate as of 8/23/18 11:38 AM.  Always use your most recent med list.                   Brand Name Dispense Instructions for use Diagnosis    cetirizine 5 MG tablet    zyrTEC     Take 5 mg by mouth daily        citalopram 20 MG tablet    celeXA    90 tablet    TAKE 1 TABLET (20 MG) BY MOUTH DAILY    Anxiety       clopidogrel 75 MG tablet    PLAVIX    90 tablet    TAKE 1 TABLET (75 MG) BY MOUTH DAILY    Transient cerebral ischemia, unspecified type       lisinopril-hydrochlorothiazide 20-25 MG per tablet    PRINZIDE/ZESTORETIC    90 tablet    TAKE 1 TABLET EVERY DAY    Hypertension goal BP (blood pressure) < 150/90       Potassium Chloride ER 20 MEQ Tbcr     90 tablet    TAKE 1 TABLET EVERY DAY    Hypertension goal BP (blood pressure) < 150/90       PROTONIX 40 MG EC tablet   Generic drug:  pantoprazole      Take 40 mg by mouth daily        rosuvastatin 5 MG tablet    CRESTOR    90 tablet    TAKE 1 TABLET EVERY DAY    Mixed hyperlipidemia       verapamil HCl  MG Cp24     90 capsule    TAKE 1 CAPSULE EVERY DAY    Hypertension goal BP (blood pressure) < 150/90

## 2018-08-23 NOTE — PROGRESS NOTES
Rheumatology Clinic Visit      Swati Marinelli MRN# [id number]   YOB: 1940 Age: 78 year old      Date of visit: 8/23/18   Referring provider: Dr. Lissa Wilson  PCP: Dr. Deborah Terrell    Chief Complaint   Patient presents with:  Consult: patient has pain all over      Assessment and Plan     1. ANH 1:80 speckled, multiple joint pains, sicca syndrome: Joint pains are degenerative in nature; no synovitis on exam and previous imaging studies were not suggestive of an inflammatory etiology.  She does have mild dry eye and dry mouth with a positive ANH that could be Sjogren's syndrome; we discussed the difference between sicca syndrome and Sjogren's syndrome and the option of doing additional labs today; because she is doing well with symptomatic treatment the additional workup would not change her treatment plan at this time she did not want additional testing.  We discussed that if Sjogren's syndrome she would have an increased risk for lymphoma. No other symptoms to suggest another ANH-associated rheumatologic disease.  Symptomatic treatment for her joint pains: Tylenol as needed, limiting to no more than 4000 mg per day; may also try capsaicin cream.  Overall she says that she is doing well and will probably try these but does not want to do more because she is tolerating her symptoms well.  Note that NSAIDs are avoided because of her reported aspirin allergy.     Ms. Marinelli verbalized agreement with and understanding of the rational for the diagnosis and treatment plan.  All questions were answered to best of my ability and the patient's satisfaction. Ms. Marinelli was advised to contact the clinic with any questions that may arise after the clinic visit.      Thank you for involving me in the care of the patient    Return to clinic: No scheduled return appointment in rheumatology needed at this time. Return PRN.       HPI   Swati Marinelli is a 78 year old female with a past medical history significant for  "hypertension, hyperlipidemia, obstructive sleep apnea, aortic valve insufficiency, bilateral rotator cuff repairs, GERD, and anxiety who is seen in consultation at the request of Dr. Lissa Wilson for evaluation of arthralgia and +ANH.    Today, Ms. Marinelli reports that she has some pain and says that it is probably just because she is old but her PCP Dr. Terrell told her that she should see rheumatology to make sure not anything else.  Father had rheumatic fever, and had terrible arthritis.  Diffuse body pain all the time, \"I deal with it\", \"I'm an upbeat person\". Onset of pain over many years, starting about 10 years ago.  No joint swelling. Pain is all day; not specifically worse in the AM or PM.  Walking more to try to help and she thinks that it is.  All pain is worse with more activity. Stiffness in the AM and diffuse pain even when first waking up; morning stiffness for >1 hour.     Denies fevers, chills, nausea, vomiting, constipation, diarrhea. No abdominal pain. No chest pain/pressure, palpitations, or shortness of breath. No LE swelling.  Has had 1 oral sore in the past.  No nasal sores.    No rash.  Dry eyes treated with artificial tears.  Dry mouth treated with sips of water when needed; follows with a dentist and does not get frequent cavities.  No photosensitivity or photophobia. No eye pain or redness. No history of inflammatory eye disease.  No history of DVT, pulmonary embolism, or miscarriage.   No history of serositis.  No history of Raynaud's Phenomenon.      Tobacco: None  EtOH: None  Drugs: None    ROS   GEN: No fevers or chills  SKIN: No itching, rashes, sores  HEENT: See HPI  CV: No chest pain, pressure, palpitations, or dyspnea on exertion.  PULM: No SOB, wheeze, cough.  GI: No nausea, vomiting, constipation, diarrhea. No blood in stool. No abdominal pain.  : No blood in urine.  MSK: See HPI.  NEURO: No numbness or tingling  EXT: No LE swelling  PSYCH: Negative    Active Problem List "     Patient Active Problem List   Diagnosis     Advanced directives, counseling/discussion     Hypertension goal BP (blood pressure) < 150/90     Neurocardiogenic syncope     Mixed hyperlipidemia     MANNY (obstructive sleep apnea)- 'moderate' (AHI 19)     Nonrheumatic aortic valve insufficiency     Anxiety     Gastroesophageal reflux disease without esophagitis     Past Medical History     Past Medical History:   Diagnosis Date     TIA (transient ischemic attack) 2010    Possible TIA approximately 2010. Occular symptoms in left eye, likely occipital      Past Surgical History     Past Surgical History:   Procedure Laterality Date     APPENDECTOMY OPEN CHILD  1951     C TOTAL KNEE ARTHROPLASTY Left 2011     C TOTAL KNEE ARTHROPLASTY Left 2013    revision     CARPAL TUNNEL RELEASE RT/LT Right 1995 1990s     CATARACT IOL, RT/LT  2009    right 2/4/09, left 2/21/09     CHOLECYSTECTOMY  06/2012     COLONOSCOPY  2012    normal     MOHS MICROGRAPHIC PROCEDURE  2011    back - 2011, lip and back 5/2012     ROTATOR CUFF REPAIR RT/LT Right 2009     ROTATOR CUFF REPAIR RT/LT Left 04/20/2016     TONSILLECTOMY  1945     Allergy     Allergies   Allergen Reactions     Aleve [Naproxen]      Asa [Aspirin]      Codeine      Doxycycline      Elavil [Amitriptyline]      Etodolac      Gabapentin      Lyrica [Pregabalin]      Mushroom      Oxycodone      Oxycodone-Acetaminophen      Vicodin [Hydrocodone-Acetaminophen]      Current Medication List     Current Outpatient Prescriptions   Medication Sig     ALPRAZolam (XANAX) 0.25 MG tablet Take 1-2 tablets (0.25-0.5 mg) by mouth nightly as needed for sleep or anxiety     cetirizine (ZYRTEC) 5 MG tablet Take 5 mg by mouth daily     citalopram (CELEXA) 20 MG tablet TAKE 1 TABLET (20 MG) BY MOUTH DAILY     clopidogrel (PLAVIX) 75 MG tablet TAKE 1 TABLET (75 MG) BY MOUTH DAILY     lisinopril-hydrochlorothiazide (PRINZIDE/ZESTORETIC) 20-25 MG per tablet TAKE 1 TABLET EVERY DAY     pantoprazole  "(PROTONIX) 40 MG EC tablet Take 40 mg by mouth daily     Potassium Chloride ER 20 MEQ TBCR TAKE 1 TABLET EVERY DAY     rosuvastatin (CRESTOR) 5 MG tablet TAKE 1 TABLET EVERY DAY     verapamil HCl  MG CP24 TAKE 1 CAPSULE EVERY DAY     No current facility-administered medications for this visit.          Social History   See HPI    Family History     Family History   Problem Relation Age of Onset     Tuberculosis Mother      Myocardial Infarction Father      Coronary Artery Disease Father      Myocardial Infarction Brother      Coronary Artery Disease Brother      Heart Surgery Daughter      Diabetes No family hx of      Colon Cancer No family hx of      Breast Cancer No family hx of        Physical Exam     Temp Readings from Last 3 Encounters:   08/23/18 99.8  F (37.7  C) (Oral)   07/11/18 97.9  F (36.6  C) (Oral)   04/19/18 97.4  F (36.3  C) (Oral)     BP Readings from Last 5 Encounters:   08/23/18 134/64   07/31/18 114/64   07/11/18 139/73   04/19/18 144/78   04/05/18 141/72     Pulse Readings from Last 1 Encounters:   08/23/18 74     Resp Readings from Last 1 Encounters:   07/11/18 18     Estimated body mass index is 28.81 kg/(m^2) as calculated from the following:    Height as of 7/31/18: 1.537 m (5' 0.5\").    Weight as of an earlier encounter on 8/23/18: 68 kg (150 lb).    GEN: NAD  HEENT: Dry mucous membranes. No oral lesions. Anicteric, noninjected sclera.  Eyes appear to have good moisture by gross examination.  CV: S1, S2. RRR. No m/r/g.  PULM: CTA bilaterally. No w/c.  MSK: Heberden's or Pete's nodes present.  No clear synovitis of the hands.  Wrists without synovial swelling or tenderness palpation.  Elbows and shoulders without swelling or tenderness palpation; but some pain with range of motion of the shoulder; range of motion was intact.  Hips mildly tender to direct palpation and with internal and external range of motion.  Knees with medial joint line tenderness and crepitation but no " effusion or increased warmth.  Ankles and MTPs without swelling or tenderness to palpation.  Achilles tendons nontender to palpation.      NEURO: UE and LE strengths 5/5 and equal bilaterally.   SKIN: No rash  EXT: No LE edema  PSYCH: Alert. Appropriate.    Labs / Imaging (select studies)     ANH  Recent Labs   Lab Test  04/19/18   1105   SIMA  Borderline Positive*   ANAP1  SPECKLED   ANAT1  1:80     CBC  Recent Labs   Lab Test  04/19/18   1105   WBC  10.5   RBC  5.05   HGB  15.2   HCT  45.4   MCV  90   RDW  14.9   PLT  235   MCH  30.1   MCHC  33.5     CMP  Recent Labs   Lab Test  07/11/18   0944  04/19/18   1105 01/03/18   0841 05/11/17 09/28/15   NA  141   --   141   --    --    POTASSIUM  4.0   --   3.6  4.0  3.6   CHLORIDE  103   --   102   --    --    CO2  29   --   28   --    --    ANIONGAP  9   --   11   --    --    GLC  81   --   88  81  88   BUN  20   --   19   --    --    CR  0.95   --   0.91  0.9  1.0   GFRESTIMATED  57*   --   60*   --    --    GFRESTBLACK  69   --   73   --    --    YOVANI  9.6   --   9.4   --    --    BILITOTAL   --   0.4   --    --    --    ALBUMIN   --   3.7   --    --    --    PROTTOTAL   --   7.1   --    --    --    ALKPHOS   --   61   --    --    --    AST   --   16   --   18  23   ALT   --   25   --   27  19     Calcium/VitaminD  Recent Labs   Lab Test  07/11/18   0944  04/19/18   1105  01/03/18   0841   YOVANI  9.6   --   9.4   VITDT   --   53   --      CK/Aldolase  Recent Labs   Lab Test  04/19/18 1105   CKT  36     TSH/T4  Recent Labs   Lab Test  04/19/18   1105 04/20/16   TSH  2.68  3.35     8/23/2019 x-ray of the pelvis shows moderate degenerative changes in the bilateral hips    11/15/2017 x-ray of the left foot shows degenerative changes    2/23/2016 MRI of the left shoulder shows full-thickness supraspinatus tendon tear     2/3/2016 MRI of the cervical spine shows multilevel degenerative changes      Immunization History     Immunization History   Administered Date(s)  Administered     Pneumo Conj 13-V (2010&after) 09/25/2015     Pneumococcal 23 valent 09/10/2009     TDAP Vaccine (Adacel) 07/12/2018     Zoster vaccine, live 09/15/2009          Chart documentation done in part with Dragon Voice recognition Software. Although reviewed after completion, some word and grammatical error may remain.    Melquiades Stephens MD

## 2018-08-23 NOTE — PROGRESS NOTES
Large Joint Injection/Arthocentesis  Date/Time: 8/23/2018 9:42 AM  Performed by: AMANDA LIGHT  Authorized by: AMANDA LIGHT     Indications:  Pain and osteoarthritis  Guidance: landmark guided    Location:  Hip  Laterality:  Bilateral  Site:  Bilateral greater trochanteric bursa  Medications (Right):  2 mL lidocaine 1 %; 80 mg methylPREDNISolone acetate 80 MG/ML  Outcome:  Tolerated well, no immediate complications  Procedure discussed: discussed risks, benefits, and alternatives    Consent Given by:  Patient  Timeout: timeout called immediately prior to procedure    Prep: patient was prepped and draped in usual sterile fashion

## 2018-08-23 NOTE — Clinical Note
Joint pains are degenerative in nature and she says that she is tolerating them quite well - not holding her back.  Tylenol PRN.  Capsaicin cream is also going to be tried.  Possible Sjogren's given the sicca symptoms and positive ANH but additional testing would not change the treatment - she is doing well with artificial tears and sips of water. I will see her back as needed. Thanks! Melquiades

## 2018-08-23 NOTE — PATIENT INSTRUCTIONS
Tylenol as needed  Capsaicin cream as needed.    Rheumatology    Dr. Melquiades [place] [facility]   (Monday)  [address]  [place] [address]       [facility]   (Tuesday)  [address] [place] [address]    [facility]   (Wed., Thurs., and Friday)  [address] [place] [address]    Phone number: [phone]  Thank you for choosing [facility].  Radha Olmstead, [facility]

## 2018-08-23 NOTE — LETTER
8/23/2018         RE: Swati Marinelli  [address]  [place] [address]        Dear Colleague,    Thank you for referring your patient, Swati Marinelli, to the [facility]. Please see a copy of my visit note below.    SUBJECTIVE:   Swati Marienlli is here in follow up of bilateral hip greater trochanteric bursitis and possible right hip osteoarthritis.    Last injection(s):   1. The patient had 4-5 previous hip steroid injections receiving them every 3-4 months in [place].  2. Bilateral Hip greater trochanteric bursal steroid injection on 04/05/18. Length of effectiveness: 3 months    Review of Systems:  Constitutional/General: Negative for fever, chills, change in weight  Integumentary/Skin: Negative for worrisome rashes, moles, or lesions  Neuro: Negative for weakness, dizziness, or paresthesias   Psychiatric: negative for changes in mood or affect    OBJECTIVE:  Physical Exam:  /64  Pulse 74  Temp 99.8  F (37.7  C) (Oral)  Wt 68 kg (150 lb)  BMI 28.81 kg/m2  General Appearance: healthy, alert and no distress   Skin: no suspicious lesions or rashes  Neuro: Normal strength and tone, mentation intact and speech normal  Vascular: good pulses, and capillary refill   Lymph: no lymphadenopathy   Psych:  mentation appears normal and affect normal/bright  Resp: no increased work of breathing    Bilateral Hip Exam:   Left Hip Right Hip   ROM: Flexion Full   110* degrees    ROM: Internal Rotation 20 degrees  10* degrees    ROM: External Rotation 40 degrees  45 degrees    ROM: Abduction full 35 degrees   Strength Good flexion and abduction strength Good flexion and abduction strength     Special tests: Trendelenburg's: negative    X-rays:  Obtained today, 08/21/18, of the AP pelvis and lateral right hip, reviewed in the office with the patient by myself today and show moderate hypertrophic changes in both hips, osteophytes on the acetabulum and femoral head, and significant calcification on the  greater trochanters bilaterally.    ASSESSMENT:   1. Greater trochanteric bursitis, bilateral hips  2. Moderate OA, bilateral hips  The patient does say that she has groin pain occasionally in both hips.    PLAN:   We decided to proceed with repeat bilateral greater trochanteric bursal injection. Physical therapy ordered. Discussed the risks of repeat steroid injections to a certain area and I urged the patient to wait more than 6 months between injections, if at all possible.    Procedure Note:  With the patient's consent, bilateral hips injected in the greater trochanteric bursa with 80mg of Depomedrol and 2cc of local anesthetic after sterile prep.    Return to clinic: CHINO Light MD  Dept. Orthopedic Surgery  [facility]     This document serves as a record of the services and decisions personally performed and made by Dr. JAYDEN Light MD. It was created on his behalf by Mike Mccain, a trained medical scribe. The creation of this record is based on the provider's personal observations and the statements of the patient. This document has been checked and approved by the attending provider.   Mike Mccain August 23, 2018 9:37 AM    Large Joint Injection/Arthocentesis  Date/Time: 8/23/2018 9:42 AM  Performed by: AMANDA LIGHT  Authorized by: AMANDA LIGHT     Indications:  Pain and osteoarthritis  Guidance: landmark guided    Location:  Hip  Laterality:  Bilateral  Site:  Bilateral greater trochanteric bursa  Medications (Right):  2 mL lidocaine 1 %; 80 mg methylPREDNISolone acetate 80 MG/ML  Outcome:  Tolerated well, no immediate complications  Procedure discussed: discussed risks, benefits, and alternatives    Consent Given by:  Patient  Timeout: timeout called immediately prior to procedure    Prep: patient was prepped and draped in usual sterile fashion                Again, thank you for allowing me to participate in the care of your patient.         Sincerely,        Castillo Heller MD

## 2018-08-23 NOTE — MR AVS SNAPSHOT
After Visit Summary   8/23/2018    Swati Marinelli    MRN: [id number]           Patient Information     Date Of Birth          1940        Visit Information        Provider Department      8/23/2018 8:30 AM Castillo Heller MD [facility]        Today's Diagnoses     Trochanteric bursitis of both hips    -  1    Lateral pain of right hip        Primary osteoarthritis of both hips           Follow-ups after your visit        Additional Services     [facility] PT, HAND, AND CHIROPRACTIC REFERRAL       Your provider has referred you to: Physical Therapy    Indication/Reason for Referral: Trochanteric bursitis of both hips  (primary encounter diagnosis)  Lateral pain of right hip  Primary osteoarthritis of both hips   Onset of Illness:    Therapy Orders:  Physical Therapy at [facility] or [facility] Evaluate and Treat  Modalities:  As Indicated:   Equipment: As Indicated:   Special Request: None    Additional Comments: # of visits per therapist's discretion                  Your next 10 appointments already scheduled     Aug 29, 2018  2:20 PM CDT   Return Sleep Patient with Kannan Medina MD   [facility] ([facility])    [address] [place] [address] [phone]            Jan 14, 2019  9:00 AM CST   [facility]    [address] [address] [phone]           Bring a current list of meds and any records pertaining to this visit. For Physicals, please bring immunization records and any forms needing to be filled out. Please arrive 10 minutes early to complete paperwork.              Who to contact     If you have questions or need follow up information about today's clinic visit or your schedule please contact [facility] directly at [phone].  Normal or non-critical lab and imaging results will be communicated to  you by MyChart, letter or phone within 4 business days after the clinic has received the results. If you do not hear from us within 7 days, please contact the clinic through [facility] [employer] or phone. If you have a critical or abnormal lab result, we will notify you by phone as soon as possible.  Submit refill requests through [facility] [employer] or call your pharmacy and they will forward the refill request to us. Please allow 3 business days for your refill to be completed.          Additional Information About Your Visit        [email or web address] Information     [employer] [employer] gives you secure access to your electronic health record. If you see a primary care provider, you can also send messages to your care team and make appointments. If you have questions, please call your primary care clinic.  If you do not have a primary care provider, please call [phone] and they will assist you.        Care EveryWhere ID     This is your Care EveryWhere ID. This could be used by other organizations to access your [facility] medical records  [id number]        Your Vitals Were     Pulse Temperature BMI (Body Mass Index)             74 99.8  F (37.7  C) (Oral) 28.81 kg/m2          Blood Pressure from Last 3 Encounters:   08/23/18 111/65   08/23/18 134/64   07/31/18 114/64    Weight from Last 3 Encounters:   08/23/18 69 kg (152 lb 3.2 oz)   08/23/18 68 kg (150 lb)   07/31/18 69.9 kg (154 lb)              We Performed the Following     [facility] PT, HAND, AND CHIROPRACTIC REFERRAL     Large Joint Injection/Arthocentesis        Primary Care Provider Office Phone # Fax #    Deborah Gisela Terrell [phone] [address] [facility] [address]        Equal Access to Services     ALICIA [facility] : Hadii juanita Ramirez, walesiada rosa, qaybta [place]. So [facility] [phone].    ATENCIÓN: Si habla español, tiene a apodaca disposición servicios gratuitos de asistencia lingüística. Llame al  [phone].    We comply with applicable federal civil rights laws and [place] laws. We do not discriminate on the basis of race, color, national origin, age, disability, sex, sexual orientation, or gender identity.            Thank you!     Thank you for choosing [facility]  for your [facility]. Our goal is always to provide you with excellent care. Hearing back from our patients is one way we can continue to improve our services. Please take a few minutes to complete the written survey that you may receive in the mail after your visit with us. Thank you!             Your Updated Medication List - Protect others around you: Learn how to safely use, store and throw away your medicines at www.disposemymeds.org.          This list is accurate as of 8/23/18 12:58 PM.  Always use your most recent med list.                   Brand Name Dispense Instructions for use Diagnosis    cetirizine 5 MG tablet    zyrTEC     Take 5 mg by mouth daily        citalopram 20 MG tablet    celeXA    90 tablet    TAKE 1 TABLET (20 MG) BY MOUTH DAILY    Anxiety       clopidogrel 75 MG tablet    PLAVIX    90 tablet    TAKE 1 TABLET (75 MG) BY MOUTH DAILY    Transient cerebral ischemia, unspecified type       lisinopril-hydrochlorothiazide 20-25 MG per tablet    PRINZIDE/ZESTORETIC    90 tablet    TAKE 1 TABLET EVERY DAY    Hypertension goal BP (blood pressure) < 150/90       Potassium Chloride ER 20 MEQ Tbcr     90 tablet    TAKE 1 TABLET EVERY DAY    Hypertension goal BP (blood pressure) < 150/90       PROTONIX 40 MG EC tablet   Generic drug:  pantoprazole      Take 40 mg by mouth daily        rosuvastatin 5 MG tablet    CRESTOR    90 tablet    TAKE 1 TABLET EVERY DAY    Mixed hyperlipidemia       verapamil HCl  MG Cp24     90 capsule    TAKE 1 CAPSULE EVERY DAY    Hypertension goal BP (blood pressure) < 150/90

## 2018-08-23 NOTE — PROGRESS NOTES
SUBJECTIVE:   Swati Marinelli is here in follow up of bilateral hip greater trochanteric bursitis and possible right hip osteoarthritis.    Last injection(s):   1. The patient had 4-5 previous hip steroid injections receiving them every 3-4 months in [place].  2. Bilateral Hip greater trochanteric bursal steroid injection on 04/05/18. Length of effectiveness: 3 months    Review of Systems:  Constitutional/General: Negative for fever, chills, change in weight  Integumentary/Skin: Negative for worrisome rashes, moles, or lesions  Neuro: Negative for weakness, dizziness, or paresthesias   Psychiatric: negative for changes in mood or affect    OBJECTIVE:  Physical Exam:  /64  Pulse 74  Temp 99.8  F (37.7  C) (Oral)  Wt 68 kg (150 lb)  BMI 28.81 kg/m2  General Appearance: healthy, alert and no distress   Skin: no suspicious lesions or rashes  Neuro: Normal strength and tone, mentation intact and speech normal  Vascular: good pulses, and capillary refill   Lymph: no lymphadenopathy   Psych:  mentation appears normal and affect normal/bright  Resp: no increased work of breathing    Bilateral Hip Exam:   Left Hip Right Hip   ROM: Flexion Full   110* degrees    ROM: Internal Rotation 20 degrees  10* degrees    ROM: External Rotation 40 degrees  45 degrees    ROM: Abduction full 35 degrees   Strength Good flexion and abduction strength Good flexion and abduction strength     Special tests: Trendelenburg's: negative    X-rays:  Obtained today, 08/21/18, of the AP pelvis and lateral right hip, reviewed in the office with the patient by myself today and show moderate hypertrophic changes in both hips, osteophytes on the acetabulum and femoral head, and significant calcification on the greater trochanters bilaterally.    ASSESSMENT:   1. Greater trochanteric bursitis, bilateral hips  2. Moderate OA, bilateral hips  The patient does say that she has groin pain occasionally in both hips.    PLAN:   We decided to proceed with  repeat bilateral greater trochanteric bursal injection. Physical therapy ordered. Discussed the risks of repeat steroid injections to a certain area and I urged the patient to wait more than 6 months between injections, if at all possible.    Procedure Note:  With the patient's consent, bilateral hips injected in the greater trochanteric bursa with 80mg of Depomedrol and 2cc of local anesthetic after sterile prep.    Return to clinic: CHINO Heller MD  Dept. Orthopedic Surgery  [facility]     This document serves as a record of the services and decisions personally performed and made by Dr. JAYDEN Heller MD. It was created on his behalf by Mike Mccain, a trained medical scribe. The creation of this record is based on the provider's personal observations and the statements of the patient. This document has been checked and approved by the attending provider.   Mike Mccain August 23, 2018 9:37 AM

## 2018-08-29 ENCOUNTER — OFFICE VISIT (OUTPATIENT)
Dept: SLEEP MEDICINE | Facility: CLINIC | Age: 78
End: 2018-08-29
Payer: MEDICARE

## 2018-08-29 VITALS
HEIGHT: 61 IN | BODY MASS INDEX: 28.51 KG/M2 | HEART RATE: 98 BPM | DIASTOLIC BLOOD PRESSURE: 72 MMHG | SYSTOLIC BLOOD PRESSURE: 119 MMHG | WEIGHT: 151 LBS | OXYGEN SATURATION: 96 %

## 2018-08-29 DIAGNOSIS — G47.33 OSA (OBSTRUCTIVE SLEEP APNEA): Primary | Chronic | ICD-10-CM

## 2018-08-29 PROCEDURE — 99214 OFFICE O/P EST MOD 30 MIN: CPT | Performed by: INTERNAL MEDICINE

## 2018-08-29 NOTE — PATIENT INSTRUCTIONS
Your BMI is Body mass index is 29 kg/(m^2).  Weight management is a personal decision.  If you are interested in exploring weight loss strategies, the following discussion covers the approaches that may be successful. Body mass index (BMI) is one way to tell whether you are at a healthy weight, overweight, or obese. It measures your weight in relation to your height.  A BMI of 18.5 to 24.9 is in the healthy range. A person with a BMI of 25 to 29.9 is considered overweight, and someone with a BMI of 30 or greater is considered obese. More than two-thirds of American adults are considered overweight or obese.  Being overweight or obese increases the risk for further weight gain. Excess weight may lead to heart disease and diabetes.  Creating and following plans for healthy eating and physical activity may help you improve your health.  Weight control is part of healthy lifestyle and includes exercise, emotional health, and healthy eating habits. Careful eating habits lifelong are the mainstay of weight control. Though there are significant health benefits from weight loss, long-term weight loss with diet alone may be very difficult to achieve- studies show long-term success with dietary management in less than 10% of people. Attaining a healthy weight may be especially difficult to achieve in those with severe obesity. In some cases, medications, devices and surgical management might be considered.  What can you do?  If you are overweight or obese and are interested in methods for weight loss, you should discuss this with your provider.     Consider reducing daily calorie intake by 500 calories.     Keep a food journal.     Avoiding skipping meals, consider cutting portions instead.    Diet combined with exercise helps maintain muscle while optimizing fat loss. Strength training is particularly important for building and maintaining muscle mass. Exercise helps reduce stress, increase energy, and improves fitness.  Increasing exercise without diet control, however, may not burn enough calories to loose weight.       Start walking three days a week 10-20 minutes at a time    Work towards walking thirty minutes five days a week     Eventually, increase the speed of your walking for 1-2 minutes at time    In addition, we recommend that you review healthy lifestyles and methods for weight loss available through the National Institutes of Health patient information sites:  http://win.niddk.nih.gov/publications/index.htm    And look into health and wellness programs that may be available through your health insurance provider, employer, local community center, or [employer] club.    Weight management plan: Patient was referred to their PCP to discuss a diet and exercise plan.    8/17/18

## 2018-08-29 NOTE — MR AVS SNAPSHOT
After Visit Summary   8/29/2018    Swati Marinelli    MRN: [id number]           Patient Information     Date Of Birth          1940        Visit Information        Provider Department      8/29/2018 2:20 PM Kannan Medina MD [facility]        Today's Diagnoses     MANNY (obstructive sleep apnea)- 'moderate' (AHI 19)    -  1      Care Instructions      Your BMI is Body mass index is 29 kg/(m^2).  Weight management is a personal decision.  If you are interested in exploring weight loss strategies, the following discussion covers the approaches that may be successful. Body mass index (BMI) is one way to tell whether you are at a healthy weight, overweight, or obese. It measures your weight in relation to your height.  A BMI of 18.5 to 24.9 is in the healthy range. A person with a BMI of 25 to 29.9 is considered overweight, and someone with a BMI of 30 or greater is considered obese. More than two-thirds of American adults are considered overweight or obese.  Being overweight or obese increases the risk for further weight gain. Excess weight may lead to heart disease and diabetes.  Creating and following plans for healthy eating and physical activity may help you improve your health.  Weight control is part of healthy lifestyle and includes exercise, emotional health, and healthy eating habits. Careful eating habits lifelong are the mainstay of weight control. Though there are significant health benefits from weight loss, long-term weight loss with diet alone may be very difficult to achieve- studies show long-term success with dietary management in less than 10% of people. Attaining a healthy weight may be especially difficult to achieve in those with severe obesity. In some cases, medications, devices and surgical management might be considered.  What can you do?  If you are overweight or obese and are interested in methods for weight loss, you should discuss this with your provider.      Consider reducing daily calorie intake by 500 calories.     Keep a food journal.     Avoiding skipping meals, consider cutting portions instead.    Diet combined with exercise helps maintain muscle while optimizing fat loss. Strength training is particularly important for building and maintaining muscle mass. Exercise helps reduce stress, increase energy, and improves fitness. Increasing exercise without diet control, however, may not burn enough calories to loose weight.       Start walking three days a week 10-20 minutes at a time    Work towards walking thirty minutes five days a week     Eventually, increase the speed of your walking for 1-2 minutes at time    In addition, we recommend that you review healthy lifestyles and methods for weight loss available through the National Institutes of Health patient information sites:  http://win.niddk.nih.gov/publications/index.htm    And look into health and wellness programs that may be available through your health insurance provider, employer, local community center, or [employer] club.    Weight management plan: Patient was referred to their PCP to discuss a diet and exercise plan.    8/17/18          Follow-ups after your visit        Follow-up notes from your care team     Return in about 2 months (around 10/29/2018) for Routine Visit.      Your next 10 appointments already scheduled     Sep 07, 2018  2:50 PM CDT   (Arrive by 2:35 PM)   BRIAN Extremity with Eder Patten PT   [facility] ([facility])    [address]  [facility] [address]   [phone]            Nov 14, 2018  9:20 AM CST   Return Sleep Patient with Kannan Medina MD   [facility] ([facility])    [address]  [place] [address]   [phone]            Jan 14, 2019  9:00 AM CST   [place]   [facility] [place]    [address] [address] [phone]           Bring a current list of meds and any records pertaining to this visit. For Physicals, please bring immunization records and any forms needing to be filled out. Please arrive 10 minutes early to complete paperwork.              Who to contact     If you have questions or need follow up information about today's clinic visit or your schedule please contact [facility] directly at [phone].  Normal or non-critical lab and imaging results will be communicated to you by Verdeecohart, letter or phone within 4 business days after the clinic has received the results. If you do not hear from us within 7 days, please contact the clinic through BizeeBee or phone. If you have a critical or abnormal lab result, we will notify you by phone as soon as possible.  Submit refill requests through BizeeBee or call your pharmacy and they will forward the refill request to us. Please allow 3 business days for your refill to be completed.          Additional Information About Your Visit        BizeeBee Information     BizeeBee gives you secure access to your electronic health record. If you see a primary care provider, you can also send messages to your care team and make appointments. If you have questions, please call your primary care clinic.  If you do not have a primary care provider, please call [phone] and they will assist you.        Care EveryWhere ID     This is your Care EveryWhere ID. This could be used by other organizations to access your [facility] medical records  [id number]        Your Vitals Were     Pulse Height Pulse Oximetry BMI (Body Mass Index)          98 1.537 m (5' 0.5\") 96% 29 kg/m2         Blood Pressure from Last 3 Encounters:   08/29/18 119/72   08/23/18 111/65   08/23/18 134/64    Weight from Last 3 Encounters:   08/29/18 68.5 kg (151 lb)   08/23/18 69 kg (152 lb 3.2 oz)   08/23/18 68 kg (150 lb)         "      We Performed the Following     Comprehensive DME          Today's Medication Changes          These changes are accurate as of 8/29/18  2:54 PM.  If you have any questions, ask your nurse or doctor.               Start taking these medicines.        Dose/Directions    order for DME   Used for:  MANNY (obstructive sleep apnea)        autoCPAP 5-15 cmH20   Quantity:  1 Device   Refills:  0            Where to get your medicines      Information about where to get these medications is not yet available     ! Ask your nurse or doctor about these medications     order for DME                Primary Care Provider Office Phone # Fax #    Deborah Terrell [phone] [address] [facility] [address]        Equal Access to Services     [facility]: Hadii juanita souza hadasho Soadry, waaxda luqadaha, qaybta kaalmada adeegyada, rishi shaffer . [facility] [phone].    ATENCIÓN: Si habla español, tiene a apodaca disposición servicios gratuitos de asistencia lingüística. [facility] [phone].    We comply with applicable federal civil rights laws and [place] laws. We do not discriminate on the basis of race, color, national origin, age, disability, sex, sexual orientation, or gender identity.            Thank you!     Thank you for choosing [facility]  for your care. Our goal is always to provide you with excellent care. Hearing back from our patients is one way we can continue to improve our services. Please take a few minutes to complete the written survey that you may receive in the mail after your visit with us. Thank you!             Your Updated Medication List - Protect others around you: Learn how to safely use, store and throw away your medicines at www.disposemymeds.org.          This list is accurate as of 8/29/18  2:54 PM.  Always use your most recent med list.                   Brand Name Dispense Instructions for use Diagnosis    cetirizine  5 MG tablet    zyrTEC     Take 5 mg by mouth daily        citalopram 20 MG tablet    celeXA    90 tablet    TAKE 1 TABLET (20 MG) BY MOUTH DAILY    Anxiety       clopidogrel 75 MG tablet    PLAVIX    90 tablet    TAKE 1 TABLET (75 MG) BY MOUTH DAILY    Transient cerebral ischemia, unspecified type       lisinopril-hydrochlorothiazide 20-25 MG per tablet    PRINZIDE/ZESTORETIC    90 tablet    TAKE 1 TABLET EVERY DAY    Hypertension goal BP (blood pressure) < 150/90       order for DME     1 Device    autoCPAP 5-15 cmH20    MANNY (obstructive sleep apnea)       Potassium Chloride ER 20 MEQ Tbcr     90 tablet    TAKE 1 TABLET EVERY DAY    Hypertension goal BP (blood pressure) < 150/90       PROTONIX 40 MG EC tablet   Generic drug:  pantoprazole      Take 40 mg by mouth daily        rosuvastatin 5 MG tablet    CRESTOR    90 tablet    TAKE 1 TABLET EVERY DAY    Mixed hyperlipidemia       verapamil HCl  MG Cp24     90 capsule    TAKE 1 CAPSULE EVERY DAY    Hypertension goal BP (blood pressure) < 150/90

## 2018-08-29 NOTE — PROGRESS NOTES
Sleep Study Follow-Up Visit:    Date on this visit: 8/29/2018    Swati Marinelli comes in today for follow-up of her sleep study done on 8/17/18 at the [facility] for history of obstructive sleep apnea unknown severity by sleep studies in [place] in 2000s. She had been off CPAP since about 2016 due to not liking large 'smothering interface', with symptoms of nocturnal gasping, excessive daytime sleepiness (ESS 16) 1. Comorbid hypertension, aortic insufficiency.     Study Date: 8/17/2018 (154.0 lbs)      Sleep Architecture:   The total recording time of the polysomnogram was 531.6 minutes. The total sleep time was 368.5 minutes. Sleep latency was increased at 67.3 minutes without the use of a sleep aid. REM latency was 202.5 minutes. Arousal index was increased at 83.7 arousals per hour. Sleep efficiency was decreased at 69.3%. Wake after sleep onset was 77.0 minutes. The patient spent 8.0% of total sleep time in Stage N1, 69.3% in Stage N2, 5.2% in Stage N3, and 17.5% in REM. Time supine was 0 minutes.        Respiration: It was frequently difficult t discern between RERAs and PLMs    Events ? The polysomnogram revealed a presence of 66 obstructive, 3 central, and 0 mixed apneas resulting in an apnea index of 11.2 events per hour. There were 53 obstructive hypopneas and 0 central hypopneas resulting in an obstructive hypopnea index of 8.6 and central hypopnea index of 0 events per hour. The combined apnea/hypopnea index was 19.9 events per hour (central apnea/hypopnea index was 0.5 events per hour). The REM AHI was 74.4 events per hour. The supine AHI was n/a. The RERA index was 29.1 events per hour.  The RDI was 49.0 events per hour.    Snoring - was reported as mild to very loud.    Respiratory rate and pattern - was notable for normal respiratory rate and pattern.    Sustained Sleep Associated Hypoventilation - Transcutaneous carbon dioxide monitoring was not used, however significant  hypoventilation was not suggested by oximetry    Sleep Associated Hypoxemia - (Greater than 5 minutes O2 sat at or below 88%) was present. Baseline oxygen saturation was 91.6%. Lowest oxygen saturation was 72.6%. Time spent less than or equal to 88% was 27.0 minutes. Time spent less than or equal to 89% was 36.1 minutes.     Movement Activity:     Periodic Limb Activity - There were 576 PLMs during the entire study. The PLM index was 93.8 movements per hour. The PLM Arousal Index was 40.7 per hour.    REM EMG Activity - Excessive transient/sustained muscle activity was not present.    Nocturnal Behavior - Abnormal sleep related behaviors were not noted     Bruxism - None apparent.        Cardiac Summary:   The average pulse rate was 76.3 bpm. The minimum pulse rate was 63.1 bpm while the maximum pulse rate was 99.4 bpm.  Arrhythmias were not noted.    These findings were reviewed with patient.     Past medical/surgical history, family history, social history, medications and allergies were reviewed.      Problem List:  Patient Active Problem List    Diagnosis Date Noted     Sicca syndrome (H) 08/23/2018     Priority: Medium     ANH positive 08/23/2018     Priority: Medium     Primary osteoarthritis involving multiple joints 08/23/2018     Priority: Medium     Advanced directives, counseling/discussion 11/14/2017     Priority: Medium     Discussed Advance Directive planning with patient; information given to patient to review       Hypertension goal BP (blood pressure) < 150/90 11/14/2017     Priority: Medium     Neurocardiogenic syncope 11/14/2017     Priority: Medium     Mixed hyperlipidemia 11/14/2017     Priority: Medium     MANNY (obstructive sleep apnea)- 'moderate' (AHI 19) 11/14/2017     Priority: Medium     Initial diagnosis ?[address] [place] [address].   Study Date: 8/17/2018- (154.0 lbs) It was frequently difficult to discern between RERAs and PLMs. The combined apnea/hypopnea index was 19.9 events per hour (central  apnea/hypopnea index was 0.5 events per hour). The REM AHI was 74.4 events per hour. The supine AHI was n/a.  RDI was 49.0 events per hour. Baseline oxygen saturation was 91.6%. Lowest oxygen saturation was 72.6%. Time spent less than or equal to 88% was 27.0 minutes. Time spent less than or equal to 89% was 36.1 minutes. The PLM index was 93.8 movements per hour. The PLM Arousal Index was 40.7 per hour.       Nonrheumatic aortic valve insufficiency 11/14/2017     Priority: Medium     Moderate AI by echo 4/16       Anxiety 11/14/2017     Priority: Medium     Gastroesophageal reflux disease without esophagitis 11/14/2017     Priority: Medium        Impression/Plan:    Moderate obstructive sleep apnea.   Options discussed. Elect autocpap 5-15   She will need follow-up oximetry at some point    Periodic limb movements.   Follow for now.  If Excessive daytime sleepiness persists despite adequate treatment of  sleep disordered breathing, could consider a trial of treatment for theses.     She will follow up with me in about 2 month(s).     Twenty-five minutes spent with patient, all of which were spent face-to-face counseling, consulting, coordinating plan of care.      Kannan Medina

## 2018-08-29 NOTE — NURSING NOTE
"Chief Complaint   Patient presents with     Study Results       Initial /72  Pulse 98  Ht 1.537 m (5' 0.5\")  Wt 68.5 kg (151 lb)  SpO2 96%  BMI 29 kg/m2 Estimated body mass index is 29 kg/(m^2) as calculated from the following:    Height as of this encounter: 1.537 m (5' 0.5\").    Weight as of this encounter: 68.5 kg (151 lb).    Medication Reconciliation: complete      "

## 2018-09-11 ENCOUNTER — DOCUMENTATION ONLY (OUTPATIENT)
Dept: SLEEP MEDICINE | Facility: CLINIC | Age: 78
End: 2018-09-11
Payer: MEDICARE

## 2018-09-11 DIAGNOSIS — G47.33 OSA (OBSTRUCTIVE SLEEP APNEA): ICD-10-CM

## 2018-09-11 NOTE — PROGRESS NOTES
Patient was offered choice of vendor and chose [facility].  Swati Marinelli was set up at [place] on September 11, 2018 Patient received a Resmed AirSense 10 Auto. Pressures were set at 5-15 cm H2O.   Patient s ramp is 5 cm H2O for Auto and FLEX/EPR is EPR, 2.  Patient received a [employer] Mask name: Brevida Pillow mask Size X-Small/Small, heated tubing and heated humidifier.  Patient is enrolled in the STM Program and does need to meet compliance. Patient has a follow up on 11/14/18 with Dr. Medina.    Priti Morrow

## 2018-09-14 ENCOUNTER — DOCUMENTATION ONLY (OUTPATIENT)
Dept: SLEEP MEDICINE | Facility: CLINIC | Age: 78
End: 2018-09-14

## 2018-09-14 DIAGNOSIS — G47.33 OSA (OBSTRUCTIVE SLEEP APNEA): ICD-10-CM

## 2018-09-14 NOTE — PROGRESS NOTES
3 DAY STM VISIT    Diagnostic AHI: 19.9 PSG    Patient contacted for 3 day STM visit  Subjective measures:  Pt states things are going well.  Her mask headgear is sliding around a lot. She's going to try to adjust it and if if continues, she will let us know.  Pt is benefiting from therapy.     Device type: Auto-CPAP  PAP settings from order::  CPAP min 5 cm  H20       CPAP max 15 cm  H20  Mask type:    Nasal Pillows     Device settings from machine      Min CPAP 5.0            Max CPAP 15.0      Assessment: Nightly usage over four hours.  Action plan: Pt to have f/u 14 day STM visit.  Patient has a follow up visit scheduled:   yes within 31-90 days of set up.

## 2018-09-18 ENCOUNTER — RADIANT APPOINTMENT (OUTPATIENT)
Dept: MAMMOGRAPHY | Facility: CLINIC | Age: 78
End: 2018-09-18
Payer: MEDICARE

## 2018-09-18 DIAGNOSIS — Z12.31 VISIT FOR SCREENING MAMMOGRAM: ICD-10-CM

## 2018-09-18 PROCEDURE — 77067 SCR MAMMO BI INCL CAD: CPT | Mod: TC

## 2018-09-18 PROCEDURE — 77063 BREAST TOMOSYNTHESIS BI: CPT | Mod: TC

## 2018-09-19 ENCOUNTER — TELEPHONE (OUTPATIENT)
Dept: FAMILY MEDICINE | Facility: CLINIC | Age: 78
End: 2018-09-19

## 2018-09-19 NOTE — TELEPHONE ENCOUNTER
"\"the person you called does not have a mailbox set up yet, please try your call again\".  Jada Humphrey RN     "

## 2018-09-19 NOTE — TELEPHONE ENCOUNTER
Patient calling, she has been experiencing dizziness and wondering if Dr. Terrell could call something in to the pharmacy to help.

## 2018-09-19 NOTE — TELEPHONE ENCOUNTER
"\"The person you called does not have a mailbox set up yet, please try your call again later\".   Jada Humphrey RN     "

## 2018-09-20 ENCOUNTER — OFFICE VISIT (OUTPATIENT)
Dept: FAMILY MEDICINE | Facility: CLINIC | Age: 78
End: 2018-09-20
Payer: MEDICARE

## 2018-09-20 VITALS
TEMPERATURE: 97.9 F | OXYGEN SATURATION: 97 % | HEART RATE: 79 BPM | BODY MASS INDEX: 29 KG/M2 | DIASTOLIC BLOOD PRESSURE: 69 MMHG | SYSTOLIC BLOOD PRESSURE: 129 MMHG | RESPIRATION RATE: 16 BRPM | WEIGHT: 151 LBS

## 2018-09-20 DIAGNOSIS — R42 DIZZINESS: ICD-10-CM

## 2018-09-20 DIAGNOSIS — C44.300 MALIGNANT NEOPLASM OF SKIN OF FACE: Chronic | ICD-10-CM

## 2018-09-20 DIAGNOSIS — H93.13 TINNITUS, BILATERAL: ICD-10-CM

## 2018-09-20 DIAGNOSIS — H91.93 BILATERAL HEARING LOSS, UNSPECIFIED HEARING LOSS TYPE: Primary | ICD-10-CM

## 2018-09-20 PROCEDURE — 99214 OFFICE O/P EST MOD 30 MIN: CPT | Performed by: FAMILY MEDICINE

## 2018-09-20 RX ORDER — MECLIZINE HYDROCHLORIDE 25 MG/1
25 TABLET ORAL EVERY 6 HOURS PRN
Qty: 30 TABLET | Refills: 1 | Status: SHIPPED | OUTPATIENT
Start: 2018-09-20 | End: 2020-01-29

## 2018-09-20 ASSESSMENT — PAIN SCALES - GENERAL: PAINLEVEL: NO PAIN (0)

## 2018-09-20 NOTE — PROGRESS NOTES
SUBJECTIVE:   Swati Marinelli is a 78 year old female who presents to clinic today for the following health issues:      Dizziness      Duration: 2 days    Description   Feeling faint:  no   Feeling like the surroundings are moving: YES  Loss of consciousness or falls: no     Intensity:  moderate    Accompanying signs and symptoms:   Nausea/vomitting: no   Palpitations: no   Weakness in arms or legs: no   Vision or speech changes: no   Ringing in ears (Tinnitus): Yes  Hearing loss related to dizziness: no      dad  of a heart attack in her 40's.  Has had vertigo in the past    Worse with sudden head movement  Mild currently    Has had this in the past and has had therapy for it in [place].   However was told by her friend that a medicine might help so that is why she is here.     Patient has ringing in the ears constant  This is chronic -sounds like bees sometimes ringing.     Chest pain or palpitation: No  Syncope or presyncope: No  Motor,sensory weakness, or slurring of speech: No  Headache: No  Blurring of vision : No  Nausea: YES  Vomiting: No  Abdominal pain: No  Recent trauma: No    No thoughts of harming self or others     Tried supportive treatment  At home no relief  Additional Information: see above    Problem list and histories reviewed & adjusted, as indicated.  Additional history: as documented    Problem list, Medication list, Allergies, and Medical/Social/Surgical histories [facility] in [facility] and updated as appropriate.    ROS:  Constitutional, HEENT, cardiovascular, pulmonary, gi and gu systems are negative, except as otherwise noted.    OBJECTIVE:                                                    /69  Pulse 79  Temp 97.9  F (36.6  C) (Oral)  Resp 16  Wt 151 lb (68.5 kg)  SpO2 97%  Breastfeeding? No  BMI 29 kg/m2  Body mass index is 29 kg/(m^2).  GENERAL: healthy, alert and no distress  EYES: Eyes grossly normal to inspection, PERRL and conjunctivae and sclerae normal  HENT: ear canals and  TM's normal, nose and mouth without ulcers or lesions  NECK: no adenopathy, no asymmetry, masses, or scars and thyroid normal to palpation  RESP: lungs clear to auscultation - no rales, rhonchi or wheezes  CV: regular rate and rhythm, normal S1 S2, no S3 or S4, no murmur, click or rub, no peripheral edema and peripheral pulses strong  ABDOMEN: soft, nontender, no hepatosplenomegaly, no masses and bowel sounds normal  MS: no gross musculoskeletal defects noted, no edema  SKIN: no suspicious lesions or rashes  NEURO: Normal strength and tone, mentation intact and speech normal  PSYCH: mentation appears normal, affect normal/bright    Diagnostic Test Results:  none      ASSESSMENT/PLAN:                                                        ICD-10-CM    1. Bilateral hearing loss, unspecified hearing loss type H91.93 OTOLARYNGOLOGY REFERRAL     meclizine (ANTIVERT) 25 MG tablet   2. Dizziness R42 OTOLARYNGOLOGY REFERRAL   3. Tinnitus, bilateral H93.13 OTOLARYNGOLOGY REFERRAL   4. Malignant neoplasm of skin of face C44.300          Dizziness is likely peripheral based on exam    See patient instructions.  Signs and symptoms of worsening dizziness discussed. Alarm symptoms of possible CVA or cardiac events discussed. If with any of these advised to go to ER.    Prescribed with meclizine. Warned sedating  Sedating medications given. Aware not to drive or operate machinery while on these medications. Caution with .     Patient with hearing loss and tinnitus. These are chronic but hasn't had ENT evaluation.  Also has malignant neoplasm skin encroaching oral mucosa- per patient she was already referred to ENT for evaluation of this and has an appt. A referral was also placed by me through [employer] system so she has this available through us if she needs it. Recommend ENT evaluation for these reasons stated. Especially if dizziness persist     No driving and avoid being on any unprotected heights until dizziness is  resolved.    Alarm signs or symptoms discussed, if present recommend go to ER     Recommend follow up with primary care provider if no relief in 3 days, sooner if worse  Adverse reactions of medications discussed.  Over the counter medications discussed.   Aware to come back in if with worsening symptoms or if no relief despite treatment plan  Patient voiced understanding and had no further questions.     MD Shabnam June MD  [facility]

## 2018-09-20 NOTE — TELEPHONE ENCOUNTER
Call to patient, she is in clinic now for an appointment for lightheadedness.   Did advise patient she needs to keep appointment as needs to be seen as has not seen Dr. Deborah Terrell for this recently.  Patient verbalized understanding and will keep it    Rachel ROMERON, RN, CPN

## 2018-09-20 NOTE — MR AVS SNAPSHOT
After Visit Summary   9/20/2018    Swati Marinelli    MRN: [id number]           Patient Information     Date Of Birth          1940        Visit Information        Provider Department      9/20/2018 12:00 PM Shabnam Blanton MD [facility]        Today's Diagnoses     Bilateral hearing loss, unspecified hearing loss type    -  1    Dizziness        Tinnitus, bilateral        Malignant neoplasm of skin of face           Follow-ups after your visit        Additional Services     OTOLARYNGOLOGY REFERRAL       Your provider has referred you to: [facility]: [facility] [phone]  [email or web address]    Please be aware that coverage of these services is subject to the terms and limitations of your health insurance plan.  Call member services at your health plan with any benefit or coverage questions.      Please bring the following with you to your appointment:    (1) Any X-Rays, CTs or MRIs which have been performed.  Contact the facility where they were done to arrange for  prior to your scheduled appointment.   (2) List of current medications  (3) This referral request   (4) Any documents/labs given to you for this referral                  Your next 10 appointments already scheduled     Nov 14, 2018  9:20 AM CST   Return Sleep Patient with Kannan Medina MD   [facility] ([facility])    [address] [place] [id number] [phone]            Jan 14, 2019  9:00 AM CST   Office Visit with Deborah Terrell MD   [facility] ([facility])    [address] [facility] [address] [phone]           Bring a current list of meds and any records pertaining to this visit. For Physicals, please bring immunization records and any forms needing to be filled out. Please arrive 10 minutes early to complete paperwork.              Who to  contact     If you have questions or need follow up information about today's clinic visit or your schedule please contact [facility] directly at [phone].  Normal or non-critical lab and imaging results will be communicated to you by MyChart, letter or phone within 4 business days after the clinic has received the results. If you do not hear from us within 7 days, please contact the clinic through [email or web address] or phone. If you have a critical or abnormal lab result, we will notify you by phone as soon as possible.  Submit refill requests through [facility] [employer] [facility] or call your pharmacy and they will forward the refill request to us. Please allow 3 business days for your refill to be completed.          Additional Information About Your Visit        [email or web address] Information     [employer] gives you secure access to your electronic health record. If you see a primary care provider, you can also send messages to your care team and make appointments. If you have questions, please call your primary care clinic.  If you do not have a primary care provider, please call [phone] and they will assist you.        Care EveryWhere ID     This is your Care EveryWhere ID. This could be used by other organizations to access your [place] medical records  [id number]        Your Vitals Were     Pulse Temperature Respirations Pulse Oximetry Breastfeeding? BMI (Body Mass Index)    79 97.9  F (36.6  C) (Oral) 16 97% No 29 kg/m2       Blood Pressure from Last 3 Encounters:   09/20/18 129/69   08/29/18 119/72   08/23/18 111/65    Weight from Last 3 Encounters:   09/20/18 151 lb (68.5 kg)   08/29/18 151 lb (68.5 kg)   08/23/18 152 lb 3.2 oz (69 kg)              We Performed the Following     OTOLARYNGOLOGY REFERRAL          Today's Medication Changes          These changes are accurate as of 9/20/18  2:01 PM.  If you have any questions, ask your nurse or doctor.               Start taking these medicines.        Dose/Directions     meclizine 25 MG tablet   Commonly known as:  ANTIVERT   Used for:  Bilateral hearing loss, unspecified hearing loss type   Started by:  Shabnam Blanton MD        Dose:  25 mg   Take 1 tablet (25 mg) by mouth every 6 hours as needed for dizziness   Quantity:  30 tablet   Refills:  1            Where to get your medicines      These medications were sent to [facility] - [place], [place] - [address]  [address] [address]     Phone:  [phone]     meclizine 25 MG tablet                Primary Care Provider Office Phone # Fax #    Deborah Gisela Terrell [phone] [address] [facility] [address]        Equal Access to Services     SHALINI BALDWIN : Hadii juanita souza hadasho Soomaali, waaxda luqadaha, qaybta kaalmada adeegyada, rishi jaimesin niharika shaffer . So [facility] [phone].    ATENCIÓN: Si habla español, tiene a apodaca disposición servicios gratuitos de asistencia lingüística. [facility] [phone].    We comply with applicable federal civil rights laws and [place] laws. We do not discriminate on the basis of race, color, national origin, age, disability, sex, sexual orientation, or gender identity.            Thank you!     Thank you for choosing [facility]  for your care. Our goal is always to provide you with excellent care. Hearing back from our patients is one way we can continue to improve our services. Please take a few minutes to complete the written survey that you may receive in the mail after your visit with us. Thank you!             Your Updated Medication List - Protect others around you: Learn how to safely use, store and throw away your medicines at www.disposemymeds.org.          This list is accurate as of 9/20/18  2:01 PM.  Always use your most recent med list.                   Brand Name Dispense Instructions for use Diagnosis    cetirizine 5 MG tablet    zyrTEC     Take 5 mg by mouth daily         citalopram 20 MG tablet    celeXA    90 tablet    TAKE 1 TABLET (20 MG) BY MOUTH DAILY    Anxiety       clopidogrel 75 MG tablet    PLAVIX    90 tablet    TAKE 1 TABLET (75 MG) BY MOUTH DAILY    Transient cerebral ischemia, unspecified type       lisinopril-hydrochlorothiazide 20-25 MG per tablet    PRINZIDE/ZESTORETIC    90 tablet    TAKE 1 TABLET EVERY DAY    Hypertension goal BP (blood pressure) < 150/90       meclizine 25 MG tablet    ANTIVERT    30 tablet    Take 1 tablet (25 mg) by mouth every 6 hours as needed for dizziness    Bilateral hearing loss, unspecified hearing loss type       order for DME     1 Device    autoCPAP 5-15 cmH20    MANNY (obstructive sleep apnea)       Potassium Chloride ER 20 MEQ Tbcr     90 tablet    TAKE 1 TABLET EVERY DAY    Hypertension goal BP (blood pressure) < 150/90       PROTONIX 40 MG EC tablet   Generic drug:  pantoprazole      Take 40 mg by mouth daily        rosuvastatin 5 MG tablet    CRESTOR    90 tablet    TAKE 1 TABLET EVERY DAY    Mixed hyperlipidemia       verapamil HCl  MG Cp24     90 capsule    TAKE 1 CAPSULE EVERY DAY    Hypertension goal BP (blood pressure) < 150/90

## 2018-09-20 NOTE — TELEPHONE ENCOUNTER
Patient calling realized she had her ringer off on her phone and missed calls from clinic. Would like a call back today please will be by phone with trupti on she states.

## 2018-09-26 ENCOUNTER — DOCUMENTATION ONLY (OUTPATIENT)
Dept: SLEEP MEDICINE | Facility: CLINIC | Age: 78
End: 2018-09-26

## 2018-09-26 DIAGNOSIS — G47.33 OSA (OBSTRUCTIVE SLEEP APNEA): ICD-10-CM

## 2018-09-27 ENCOUNTER — DOCUMENTATION ONLY (OUTPATIENT)
Dept: SLEEP MEDICINE | Facility: CLINIC | Age: 78
End: 2018-09-27
Payer: MEDICARE

## 2018-09-27 DIAGNOSIS — G47.33 OSA (OBSTRUCTIVE SLEEP APNEA): ICD-10-CM

## 2018-09-27 NOTE — PROGRESS NOTES
Pt came in today to the  sleep clinic to get fitted with a chinstrap because she is opening her mouth during the night and it's waking her up.  I showed her how to put on the chinstrap and how to adjust her mask.  I also went over how to clean her supplies.  She will call if she continues to have issues.

## 2018-10-11 DIAGNOSIS — I10 HTN (HYPERTENSION): Primary | ICD-10-CM

## 2018-10-12 ENCOUNTER — DOCUMENTATION ONLY (OUTPATIENT)
Dept: SLEEP MEDICINE | Facility: CLINIC | Age: 78
End: 2018-10-12
Payer: MEDICARE

## 2018-10-12 DIAGNOSIS — G47.33 OSA (OBSTRUCTIVE SLEEP APNEA): ICD-10-CM

## 2018-10-12 RX ORDER — POTASSIUM CHLORIDE 1500 MG/1
TABLET, EXTENDED RELEASE ORAL
Qty: 90 TABLET | Refills: 2 | Status: SHIPPED | OUTPATIENT
Start: 2018-10-12 | End: 2019-01-14

## 2018-10-12 NOTE — PROGRESS NOTES
30 DAY STM VISIT    Diagnostic AHI: 19.9 PSG    Subjective measures:   Pt states that she hasn't used it the last few nights.  She just hasn't wanted to. She knows she needs to use it but is just having a hard time with getting herself to use it.  She did get the pole to hold the tubing so she's going to try using it again tonight.     Assessment: Pt not meeting objective benchmarks for compliance  Patient meeting subjective benchmarks.   Action plan: 2 week STM recheck appt scheduled  Patient has scheduled a follow up visit with Dr. Medina on 11/14/18.   Device type: Auto-CPAP  PAP settings: CPAP min 5.0 cm  H20     CPAP max 15.0 cm  H20    95th% pressure 10.6 cm  H20   Mask type:  Nasal Pillows  Objective measures: 14 day rolling measures      Compliance  57 %      Leak  26.86 lpm  last  upload      AHI 1.15   last  upload      Average number of minutes 317      Objective measure goal  Compliance   Goal >70%  Leak   Goal < 24 lpm  AHI  Goal < 5  Usage  Goal >240

## 2018-10-24 ENCOUNTER — DOCUMENTATION ONLY (OUTPATIENT)
Dept: SLEEP MEDICINE | Facility: CLINIC | Age: 78
End: 2018-10-24

## 2018-10-24 DIAGNOSIS — G47.33 OSA (OBSTRUCTIVE SLEEP APNEA): ICD-10-CM

## 2018-10-24 NOTE — PROGRESS NOTES
[facility] Recheck Visit     Data only recheck     Assessment: Pt not meeting objective benchmarks for compliance. Pt's use has increased and she met 30 day compliance  Action plan: Patient has a follow up visit with Dr. Medina on 11/14/18.   Device type: Auto-CPAP  PAP settings: CPAP min 5 cm  H20     CPAP max 15 cm  H20    95th% pressure 12.6 cm  H20   Objective measures: 14 day rolling measures      Compliance  42 %      Leak  29.83 lpm  last  upload      AHI 2.74   last  upload      Average number of minutes 215    Diagnostic AHI: 19.9     Objective measure goal  Compliance   Goal >70%  Leak   Goal < 10%  AHI  Goal < 5  Usage  Goal >240

## 2018-11-26 DIAGNOSIS — F41.9 ANXIETY: ICD-10-CM

## 2018-11-26 DIAGNOSIS — I10 HYPERTENSION GOAL BP (BLOOD PRESSURE) < 150/90: ICD-10-CM

## 2018-11-26 DIAGNOSIS — G45.9 TRANSIENT CEREBRAL ISCHEMIA, UNSPECIFIED TYPE: ICD-10-CM

## 2018-11-26 DIAGNOSIS — E78.2 MIXED HYPERLIPIDEMIA: ICD-10-CM

## 2018-11-28 RX ORDER — CLOPIDOGREL BISULFATE 75 MG/1
TABLET ORAL
Qty: 90 TABLET | Refills: 1 | OUTPATIENT
Start: 2018-11-28

## 2018-11-28 RX ORDER — LISINOPRIL AND HYDROCHLOROTHIAZIDE 20; 25 MG/1; MG/1
TABLET ORAL
Qty: 90 TABLET | Refills: 1 | OUTPATIENT
Start: 2018-11-28

## 2018-11-28 RX ORDER — VERAPAMIL HYDROCHLORIDE 360 MG/1
CAPSULE, DELAYED RELEASE PELLETS ORAL
Qty: 90 CAPSULE | Refills: 1 | OUTPATIENT
Start: 2018-11-28

## 2018-11-28 RX ORDER — CITALOPRAM HYDROBROMIDE 20 MG/1
TABLET ORAL
Qty: 90 TABLET | Refills: 1 | OUTPATIENT
Start: 2018-11-28

## 2018-11-28 RX ORDER — ROSUVASTATIN CALCIUM 5 MG/1
TABLET, COATED ORAL
Qty: 90 TABLET | Refills: 1 | OUTPATIENT
Start: 2018-11-28

## 2018-12-13 ENCOUNTER — DOCUMENTATION ONLY (OUTPATIENT)
Dept: SLEEP MEDICINE | Facility: CLINIC | Age: 78
End: 2018-12-13

## 2018-12-13 DIAGNOSIS — G47.33 OSA (OBSTRUCTIVE SLEEP APNEA): ICD-10-CM

## 2018-12-13 NOTE — PROGRESS NOTES
[facility] has tried to contact patient regarding her compliance requirements. Compliance follow up visit notes are needed. On 11/28/18, Parag tried contacting patient and was unable to leave a voicemail. On 12/13/18, Saritha tried contacting patient and was unable to leave a voicemail.

## 2018-12-13 NOTE — Clinical Note
Kai Medina,We are unable to reach patient on her phone to request that she re-schedule her compliance follow up for her CPAP. Patient's voicemail is not setup. Per insurance guidelines, patient is required to meet compliance for usage and follow up. Patient has met compliance with usage on CPAP. Two staff members have tried to reach out to patient and voicemail is not setup. Patient was not responsive to phone call by Parag LOMAS on 11/28/18 and Saritha PILLAI on 12/13/18. They were unable to leave a voicemail for patient to return the call. Patient never re-scheduled her compliance follow up. Is there any way you could try to reach out to patient to schedule her compliance follow up?Thank you,Rachel AMANDA

## 2018-12-27 ENCOUNTER — TELEPHONE (OUTPATIENT)
Dept: FAMILY MEDICINE | Facility: CLINIC | Age: 78
End: 2018-12-27

## 2018-12-27 DIAGNOSIS — R42 VERTIGO: Primary | ICD-10-CM

## 2018-12-27 NOTE — TELEPHONE ENCOUNTER
Per Dr Deborah Terrell's last office notes in July, patient to return around 1/11/19 for a physical.  This referral can be discussed at that time.     TC, please assist patient with appointment for physical, fasting lipids and BMP.    HEATHER IbarraN RN

## 2018-12-27 NOTE — TELEPHONE ENCOUNTER
Pt is wondering if she has to be seen again in order for a another Refferal to the [facility] or if Dr. Terrell can just put another refferal though, Cause the [facility] is saying they cant see her without another refferal from Dr. Terrell.Please call Swati and let her know .

## 2018-12-28 NOTE — TELEPHONE ENCOUNTER
Patient calling, states she is really struggling with the dizziness and would like to get into the [facility] asap. She was hoping to get in to seem them today. She is wondering if there is another provider who can do this for her in Dr. Terrell's absence.

## 2018-12-28 NOTE — TELEPHONE ENCOUNTER
"RN returned call to pt.  Pt received referral to [facility] on 5/21/18. [facility] \"and they really helped me and got me over it.\"  Pt states she was told she needs a new referral every 6 months as needed to have the visits covered by insurance.   Pt states the dizziness is off and on. She states sometimes just doing the recommended exercises resolves the issue, other times it takes longer.  Pt states she knows not to bend over or walk too fast to help minimize the progression of the sx.    Pt requesting an updated referral to [facility]. Please sign pending order and route back to team to fax and notify pt.    Kendra Arrington RN    "

## 2018-12-28 NOTE — TELEPHONE ENCOUNTER
Patient is scheduled to have Fasting Labs and OV with Provider in January, but does not want to wait to get referral-see message below from Patient. Please advise.  INES Meza

## 2019-01-04 ENCOUNTER — DOCUMENTATION ONLY (OUTPATIENT)
Dept: LAB | Facility: CLINIC | Age: 79
End: 2019-01-04

## 2019-01-04 DIAGNOSIS — I10 HYPERTENSION GOAL BP (BLOOD PRESSURE) < 150/90: Primary | Chronic | ICD-10-CM

## 2019-01-04 DIAGNOSIS — E78.2 MIXED HYPERLIPIDEMIA: Chronic | ICD-10-CM

## 2019-01-04 NOTE — PROGRESS NOTES
Please review, associate diagnosis and sign pending laboratory future orders for patient's  upcoming lab appointment on 01/08/19.    Thank you,   Leticia Rodriguez MLT

## 2019-01-04 NOTE — PROGRESS NOTES
Please review and sign Pending Pre-visit Labs in Epic.Labs 01/08/19 and BP/Chol check 01/14/19   Ivone CHACON

## 2019-01-08 ENCOUNTER — TRANSFERRED RECORDS (OUTPATIENT)
Dept: HEALTH INFORMATION MANAGEMENT | Facility: CLINIC | Age: 79
End: 2019-01-08

## 2019-01-08 NOTE — PROGRESS NOTES
SUBJECTIVE:   Swati Marinelli is a 78 year old female who presents to clinic today for the following health issues:      Hyperlipidemia Follow-Up      Rate your low fat/cholesterol diet?: Low fat, just got new book with diet plan and recipes    Taking statin?  Yes- rosuvastatin 5 mg    Side effects: No    Other lipid medications/supplements?:  None    Hypertension Follow-up      Outpatient blood pressures are being checked at home.  Results are normal. Getting 120s/70s at home    Low Salt Diet: monitoring salt  Hypertension ROS: taking medications as instructed, no medication side effects noted, no TIA's, no chest pain on exertion, no dyspnea on exertion, no swelling of ankles.  No headache or visual changes.      Depression and Anxiety Follow-Up    Status since last visit: Varies, family stressors with spouse    Other associated symptoms: None    Complicating factors:     Significant life event: No     Current substance abuse: No     Having issues with word choice, takes her longer to find the right word and some short term memory problems.    -Taking citalopram 20 mg, doing okay with current dose    PHQ 11/14/2017 7/11/2018   PHQ-9 Total Score 7 17   Q9: Suicide Ideation Not at all Not at all     OMA-7 SCORE 11/14/2017 7/11/2018   Total Score 9 7       PHQ-9  English  PHQ-9   Any Language  OMA-7  Suicide Assessment Five-step Evaluation and Treatment (SAFE-T)    Amount of exercise or physical activity:     Problems taking medications regularly: No    Medication side effects: none    Diet: Stated above    Also c/o bitemporal pain/tenderness daily for several months, more bothersome lately.  Has not had any vision or hearing changes, has not tried any tylenol/motrin for pain.  Has been wearing CPAP mask and strap applies to same areas.  She states it is very uncomfortable to wear.    Problem list and histories reviewed & adjusted, as indicated.  Additional history: as documented    Patient Active Problem List   Diagnosis      Advanced directives, counseling/discussion     Hypertension goal BP (blood pressure) < 150/90     Neurocardiogenic syncope     Mixed hyperlipidemia     MANNY (obstructive sleep apnea)- 'moderate' (AHI 19)     Nonrheumatic aortic valve insufficiency     Anxiety     Gastroesophageal reflux disease without esophagitis     Sicca syndrome (H)     ANH positive     Primary osteoarthritis involving multiple joints     Malignant neoplasm of skin of face     Past Surgical History:   Procedure Laterality Date     APPENDECTOMY OPEN CHILD  1951     C TOTAL KNEE ARTHROPLASTY Left 2011     C TOTAL KNEE ARTHROPLASTY Left 2013    revision     CARPAL TUNNEL RELEASE RT/LT Right 1995 1990s     CATARACT IOL, RT/LT  2009    right 2/4/09, left 2/21/09     CHOLECYSTECTOMY  06/2012     COLONOSCOPY  2012    normal     MOHS MICROGRAPHIC PROCEDURE  2011    back - 2011, lip and back 5/2012     ROTATOR CUFF REPAIR RT/LT Right 2009     ROTATOR CUFF REPAIR RT/LT Left 04/20/2016     TONSILLECTOMY  1945       Social History     Tobacco Use     Smoking status: Never Smoker     Smokeless tobacco: Never Used   Substance Use Topics     Alcohol use: No     Family History   Problem Relation Age of Onset     Tuberculosis Mother      Myocardial Infarction Father      Coronary Artery Disease Father      Myocardial Infarction Brother      Coronary Artery Disease Brother      Heart Surgery Daughter      Diabetes No family hx of      Colon Cancer No family hx of      Breast Cancer No family hx of          Current Outpatient Medications   Medication Sig Dispense Refill     cetirizine (ZYRTEC) 5 MG tablet Take 5 mg by mouth daily       citalopram (CELEXA) 20 MG tablet TAKE 1 TABLET (20 MG) BY MOUTH DAILY 90 tablet 1     clopidogrel (PLAVIX) 75 MG tablet TAKE 1 TABLET (75 MG) BY MOUTH DAILY 90 tablet 1     KLOR-CON 20 MEQ CR tablet TAKE 1 TABLET EVERY DAY 90 tablet 2     lisinopril-hydrochlorothiazide (PRINZIDE/ZESTORETIC) 20-25 MG per tablet TAKE 1 TABLET  EVERY DAY 90 tablet 1     order for DME autoCPAP 5-15 cmH20 1 Device 0     pantoprazole (PROTONIX) 40 MG EC tablet Take 40 mg by mouth daily       rosuvastatin (CRESTOR) 5 MG tablet TAKE 1 TABLET EVERY DAY 90 tablet 1     verapamil HCl  MG CP24 TAKE 1 CAPSULE EVERY DAY 90 capsule 1     meclizine (ANTIVERT) 25 MG tablet Take 1 tablet (25 mg) by mouth every 6 hours as needed for dizziness (Patient not taking: Reported on 1/14/2019) 30 tablet 1     BP Readings from Last 3 Encounters:   01/14/19 122/75   09/20/18 129/69   08/29/18 119/72    Wt Readings from Last 3 Encounters:   01/14/19 68 kg (150 lb)   09/20/18 68.5 kg (151 lb)   08/29/18 68.5 kg (151 lb)                  Labs reviewed in EPIC    Reviewed and updated as needed this visit by clinical staff  Tobacco  Allergies  Meds  Med Hx  Surg Hx  Fam Hx  Soc Hx      Reviewed and updated as needed this visit by Provider         ROS:  Constitutional, HEENT, cardiovascular, pulmonary, gi and gu systems are negative, except as otherwise noted.    OBJECTIVE:     /75   Pulse 82   Temp 99  F (37.2  C) (Oral)   Resp 16   Wt 68 kg (150 lb)   SpO2 96%   BMI 28.81 kg/m    Body mass index is 28.81 kg/m .  GENERAL: healthy, alert and no distress  EYES: Eyes grossly normal to inspection, PERRL and conjunctivae and sclerae normal  HEAD: temporal areas tender to palpation bilaterally  RESP: lungs clear to auscultation - no rales, rhonchi or wheezes  CV: regular rate and rhythm, normal S1 S2, no S3 or S4, no murmur, click or rub, no peripheral edema and peripheral pulses strong  MS: no gross musculoskeletal defects noted, no edema  SKIN: no suspicious lesions or rashes  PSYCH: mentation appears normal, affect normal/bright    Diagnostic Test Results:  BMP, lipid panel- results pending    ASSESSMENT/PLAN:     (I10) Hypertension goal BP (blood pressure) < 150/90  (primary encounter diagnosis)  Comment: Stable, at goal  Plan: BASIC METABOLIC PANEL, potassium  chloride ER (KLOR-CON) 20 MEQ CR tablet, lisinopril-hydrochlorothiazide (PRINZIDE/ZESTORETIC) 20-25 MG tablet, verapamil ER (VERELAN) 360 MG 24 hr capsule         -Patient doing well overall with no concerns        -Updated BMP ordered today, will contact with results        -Continue medications at current dose, f/u 6 months for OV and labs wellness exam   Refill x 6 months           (E78.2) Mixed hyperlipidemia  Comment: Doing well  Plan:Lipid panel reflex to direct LDL Fasting, rosuvastatin (CRESTOR) 5 MG tablet        -Previous lipid panel reviewed from 1/3/2018        -Updated labs ordered today, will contact with results        -Continue Rosuvastatin at current dose    (F41.9) Anxiety  Comment: Stable  Plan: citalopram (CELEXA) 20 MG tablet         -Continue medication at current dose        -Updated prescription sent today    (G45.9) Transient cerebral ischemia, unspecified type   Comment: Medication refill needed  Plan: clopidogrel (PLAVIX) 75 MG tablet          -Continue medication at current dose    (J30.9) Allergic rhinitis, unspecified seasonality, unspecified trigger   Comment: Worsening congestion  Plan:loratadine (CLARITIN) 10 MG tablet         -Recommend she discontinue Zyrtec, as it is no longer providing adequate relief and try Claritin instead        -Prescription sent today    (R51) Temporal pain   Comment: Initial encounter, present for several months  Plan: ESR: Erythrocyte sedimentation rate, CRP, inflammation         -GCA vs TMJ vs irritation from glasses or CPAP mask considered for etiology        -Labs ordered today for further evaluation, we will contact her with the results and proceed accordingly        -Recommend she see sleep specialist to be refit for her CPAP mask, as she  feels it causes irritation        -Patient will also consider getting new glasses to see if this helps        -Tylenol advised for symptomatic relief as needed      (R41.3) Poor short term memory   Comment: Patient  reports memory concerns  Plan: TSH with free T4 reflex         -TSH ordered today to evaluate for thyroid etiology        -Recommend she practice mindfulness         -Also advised patient to consider occupational therapy for assessment and treatment of memory problems     Follow-up: 6 months for wellness exam and non-fasting labs    Deborah Terrell MD  [facility]

## 2019-01-14 ENCOUNTER — OFFICE VISIT (OUTPATIENT)
Dept: FAMILY MEDICINE | Facility: CLINIC | Age: 79
End: 2019-01-14
Payer: MEDICARE

## 2019-01-14 VITALS
DIASTOLIC BLOOD PRESSURE: 75 MMHG | RESPIRATION RATE: 16 BRPM | HEART RATE: 82 BPM | TEMPERATURE: 99 F | OXYGEN SATURATION: 96 % | SYSTOLIC BLOOD PRESSURE: 122 MMHG | BODY MASS INDEX: 28.81 KG/M2 | WEIGHT: 150 LBS

## 2019-01-14 DIAGNOSIS — G45.9 TRANSIENT CEREBRAL ISCHEMIA, UNSPECIFIED TYPE: ICD-10-CM

## 2019-01-14 DIAGNOSIS — E78.2 MIXED HYPERLIPIDEMIA: ICD-10-CM

## 2019-01-14 DIAGNOSIS — J30.9 ALLERGIC RHINITIS, UNSPECIFIED SEASONALITY, UNSPECIFIED TRIGGER: ICD-10-CM

## 2019-01-14 DIAGNOSIS — I10 HYPERTENSION GOAL BP (BLOOD PRESSURE) < 150/90: Primary | ICD-10-CM

## 2019-01-14 DIAGNOSIS — F41.9 ANXIETY: ICD-10-CM

## 2019-01-14 DIAGNOSIS — R41.3 POOR SHORT TERM MEMORY: ICD-10-CM

## 2019-01-14 DIAGNOSIS — R51.9 TEMPORAL PAIN: ICD-10-CM

## 2019-01-14 LAB
ANION GAP SERPL CALCULATED.3IONS-SCNC: 7 MMOL/L (ref 3–14)
BUN SERPL-MCNC: 18 MG/DL (ref 7–30)
CALCIUM SERPL-MCNC: 9.4 MG/DL (ref 8.5–10.1)
CHLORIDE SERPL-SCNC: 104 MMOL/L (ref 94–109)
CHOLEST SERPL-MCNC: 176 MG/DL
CO2 SERPL-SCNC: 29 MMOL/L (ref 20–32)
CREAT SERPL-MCNC: 1.05 MG/DL (ref 0.52–1.04)
CRP SERPL-MCNC: <2.9 MG/L (ref 0–8)
ERYTHROCYTE [SEDIMENTATION RATE] IN BLOOD BY WESTERGREN METHOD: 8 MM/H (ref 0–30)
GFR SERPL CREATININE-BSD FRML MDRD: 50 ML/MIN/{1.73_M2}
GLUCOSE SERPL-MCNC: 84 MG/DL (ref 70–99)
HDLC SERPL-MCNC: 36 MG/DL
LDLC SERPL CALC-MCNC: 103 MG/DL
NONHDLC SERPL-MCNC: 140 MG/DL
POTASSIUM SERPL-SCNC: 4.2 MMOL/L (ref 3.4–5.3)
SODIUM SERPL-SCNC: 140 MMOL/L (ref 133–144)
TRIGL SERPL-MCNC: 187 MG/DL
TSH SERPL DL<=0.005 MIU/L-ACNC: 3.59 MU/L (ref 0.4–4)

## 2019-01-14 PROCEDURE — 85652 RBC SED RATE AUTOMATED: CPT | Performed by: FAMILY MEDICINE

## 2019-01-14 PROCEDURE — 99214 OFFICE O/P EST MOD 30 MIN: CPT | Performed by: FAMILY MEDICINE

## 2019-01-14 PROCEDURE — 80048 BASIC METABOLIC PNL TOTAL CA: CPT | Performed by: FAMILY MEDICINE

## 2019-01-14 PROCEDURE — 84443 ASSAY THYROID STIM HORMONE: CPT | Performed by: FAMILY MEDICINE

## 2019-01-14 PROCEDURE — 36415 COLL VENOUS BLD VENIPUNCTURE: CPT | Performed by: FAMILY MEDICINE

## 2019-01-14 PROCEDURE — 86140 C-REACTIVE PROTEIN: CPT | Performed by: FAMILY MEDICINE

## 2019-01-14 PROCEDURE — 80061 LIPID PANEL: CPT | Performed by: FAMILY MEDICINE

## 2019-01-14 RX ORDER — LORATADINE 10 MG/1
10 TABLET ORAL DAILY
Qty: 90 TABLET | Refills: 3 | Status: SHIPPED | OUTPATIENT
Start: 2019-01-14 | End: 2019-07-24

## 2019-01-14 RX ORDER — POTASSIUM CHLORIDE 1500 MG/1
TABLET, EXTENDED RELEASE ORAL
Qty: 90 TABLET | Refills: 1 | Status: SHIPPED | OUTPATIENT
Start: 2019-01-14 | End: 2019-07-24

## 2019-01-14 RX ORDER — ROSUVASTATIN CALCIUM 5 MG/1
TABLET, COATED ORAL
Qty: 90 TABLET | Refills: 1 | Status: SHIPPED | OUTPATIENT
Start: 2019-01-14 | End: 2019-07-24

## 2019-01-14 RX ORDER — VERAPAMIL HYDROCHLORIDE 360 MG/1
CAPSULE, DELAYED RELEASE PELLETS ORAL
Qty: 90 CAPSULE | Refills: 1 | Status: SHIPPED | OUTPATIENT
Start: 2019-01-14 | End: 2019-05-29 | Stop reason: DRUGHIGH

## 2019-01-14 RX ORDER — LISINOPRIL AND HYDROCHLOROTHIAZIDE 20; 25 MG/1; MG/1
TABLET ORAL
Qty: 90 TABLET | Refills: 1 | Status: SHIPPED | OUTPATIENT
Start: 2019-01-14 | End: 2019-07-24

## 2019-01-14 RX ORDER — CLOPIDOGREL BISULFATE 75 MG/1
TABLET ORAL
Qty: 90 TABLET | Refills: 1 | Status: SHIPPED | OUTPATIENT
Start: 2019-01-14 | End: 2019-07-24

## 2019-01-14 RX ORDER — CITALOPRAM HYDROBROMIDE 20 MG/1
TABLET ORAL
Qty: 90 TABLET | Refills: 1 | Status: SHIPPED | OUTPATIENT
Start: 2019-01-14 | End: 2019-12-31

## 2019-01-14 ASSESSMENT — PAIN SCALES - GENERAL: PAINLEVEL: NO PAIN (0)

## 2019-01-14 NOTE — PATIENT INSTRUCTIONS
Try tylenol for your head pain.        Consider occupational therapy assessment for memory problems.  Work to be more mindful and present in the moment.      Call sleep clinic about your CPAP to see if you can be re-fit for your mask as that may be contributing to your head pain.

## 2019-01-22 ENCOUNTER — TELEPHONE (OUTPATIENT)
Dept: FAMILY MEDICINE | Facility: CLINIC | Age: 79
End: 2019-01-22

## 2019-01-22 NOTE — TELEPHONE ENCOUNTER
"\"The person you have dialed is not available, please try your call again later\".  Jada Humphrey RN     "

## 2019-01-22 NOTE — TELEPHONE ENCOUNTER
Patient wanted to let provider know that genetic medication are okay for her.   Please call to advise  Thank you

## 2019-01-23 NOTE — TELEPHONE ENCOUNTER
Patient is reassured that all medications were submitted 1/14/19 for generic medication.  Patient has a $400 deductible.  Advise patient to contact care team if having issues with affording medication.  Patient/parent verbalized understanding of instructions provided and agreed with the plan of care  Jada Humphrey RN

## 2019-02-25 ENCOUNTER — TELEPHONE (OUTPATIENT)
Dept: FAMILY MEDICINE | Facility: CLINIC | Age: 79
End: 2019-02-25

## 2019-02-25 NOTE — TELEPHONE ENCOUNTER
Swati is here now. She has a question about taking a supplement. It's liquid Tumeric ( a spice). She wants to know your thoughts on any possible interactions she takes with her medications. I did advise to go to our pharmacy and ask them as well. I printed off her med list so she could take it to show them. Please call her when you can to discuss.       Thank you

## 2019-02-25 NOTE — TELEPHONE ENCOUNTER
Information below is reviewed with  Dr Deborah Terrell, Verbal Orders with her limited knowledge of herbal supplements, however still recommends speaking with the pharmacist.  Patient states [facility] pharmacist was reluctant to give out information due to he is also not knowledgeable in this area.  Advise to speak with pharmacy that patient works with.  Verbalized good understanding.     Per protocol, will route encounter to be cosigned by provider for Verbal Orders.  Jada Humphrey RN

## 2019-03-12 ENCOUNTER — DOCUMENTATION ONLY (OUTPATIENT)
Dept: SLEEP MEDICINE | Facility: CLINIC | Age: 79
End: 2019-03-12

## 2019-03-12 DIAGNOSIS — G47.33 OSA (OBSTRUCTIVE SLEEP APNEA): ICD-10-CM

## 2019-03-12 NOTE — PROGRESS NOTES
6 month [facility] Recheck Visit     Diagnostic AHI: 19.9    PSG    Data only recheck     Assessment: Pt meeting objective benchmarks.   Mask leak is only slightly elevated.   Action plan:   pt to follow up per provider request       Device type: Auto-CPAP  PAP settings: CPAP min 5.0 cm  H20     CPAP max 15.0 cm  H20        95th% pressure 12.4 cm  H20   Objective measures: 14 day rolling measures      Compliance  92 %      Leak  27.17 lpm  last  upload      AHI 3.59   last  upload      Average number of minutes 404      Objective measure goal  Compliance   Goal >70%  Leak   Goal < 24 lpm  AHI  Goal < 5  Usage  Goal >240

## 2019-03-20 ENCOUNTER — OFFICE VISIT (OUTPATIENT)
Dept: SLEEP MEDICINE | Facility: CLINIC | Age: 79
End: 2019-03-20
Payer: MEDICARE

## 2019-03-20 VITALS
BODY MASS INDEX: 29.25 KG/M2 | OXYGEN SATURATION: 96 % | WEIGHT: 149 LBS | DIASTOLIC BLOOD PRESSURE: 78 MMHG | SYSTOLIC BLOOD PRESSURE: 130 MMHG | HEIGHT: 60 IN | HEART RATE: 77 BPM

## 2019-03-20 DIAGNOSIS — G47.33 OSA (OBSTRUCTIVE SLEEP APNEA): ICD-10-CM

## 2019-03-20 DIAGNOSIS — G47.33 OSA (OBSTRUCTIVE SLEEP APNEA): Chronic | ICD-10-CM

## 2019-03-20 PROCEDURE — 99214 OFFICE O/P EST MOD 30 MIN: CPT | Performed by: INTERNAL MEDICINE

## 2019-03-20 ASSESSMENT — MIFFLIN-ST. JEOR: SCORE: 1072.36

## 2019-03-20 NOTE — NURSING NOTE
Pt was given an overnight oximetry and instructed on how to use it.    Анна Gore MA on 3/20/2019 at 9:30 AM

## 2019-03-20 NOTE — PROGRESS NOTES
Pt was given an overnight oximetry and instructed on how to use it.    Анна Gore MA on 3/20/2019 at 9:31 AM

## 2019-03-20 NOTE — PATIENT INSTRUCTIONS
Your BMI is Body mass index is 29.1 kg/m .  Weight management is a personal decision.  If you are interested in exploring weight loss strategies, the following discussion covers the approaches that may be successful. Body mass index (BMI) is one way to tell whether you are at a healthy weight, overweight, or obese. It measures your weight in relation to your height.  A BMI of 18.5 to 24.9 is in the healthy range. A person with a BMI of 25 to 29.9 is considered overweight, and someone with a BMI of 30 or greater is considered obese. More than two-thirds of American adults are considered overweight or obese.  Being overweight or obese increases the risk for further weight gain. Excess weight may lead to heart disease and diabetes.  Creating and following plans for healthy eating and physical activity may help you improve your health.  Weight control is part of healthy lifestyle and includes exercise, emotional health, and healthy eating habits. Careful eating habits lifelong are the mainstay of weight control. Though there are significant health benefits from weight loss, long-term weight loss with diet alone may be very difficult to achieve- studies show long-term success with dietary management in less than 10% of people. Attaining a healthy weight may be especially difficult to achieve in those with severe obesity. In some cases, medications, devices and surgical management might be considered.  What can you do?  If you are overweight or obese and are interested in methods for weight loss, you should discuss this with your provider.     Consider reducing daily calorie intake by 500 calories.     Keep a food journal.     Avoiding skipping meals, consider cutting portions instead.    Diet combined with exercise helps maintain muscle while optimizing fat loss. Strength training is particularly important for building and maintaining muscle mass. Exercise helps reduce stress, increase energy, and improves fitness.  Increasing exercise without diet control, however, may not burn enough calories to loose weight.       Start walking three days a week 10-20 minutes at a time    Work towards walking thirty minutes five days a week     Eventually, increase the speed of your walking for 1-2 minutes at time    In addition, we recommend that you review healthy lifestyles and methods for weight loss available through the National Institutes of Health patient information sites:  http://win.niddk.nih.gov/publications/index.htm    And look into health and wellness programs that may be available through your health insurance provider, employer, local community center, or [employer]ett club.    Weight management plan: Patient was referred to their PCP to discuss a diet and exercise plan.

## 2019-03-20 NOTE — NURSING NOTE
Chief Complaint   Patient presents with     CPAP Follow Up       Initial /78   Pulse 77   Ht 1.524 m (5')   Wt 67.6 kg (149 lb)   SpO2 96%   BMI 29.10 kg/m   Estimated body mass index is 29.1 kg/m  as calculated from the following:    Height as of this encounter: 1.524 m (5').    Weight as of this encounter: 67.6 kg (149 lb).    Medication Reconciliation: complete

## 2019-03-20 NOTE — PROGRESS NOTES
"Obstructive Sleep Apnea - PAP Follow-Up Visit:    Chief Complaint   Patient presents with     CPAP Follow Up       Swati Marinelli comes in today for follow-up of their moderate sleep apnea, managed with CPAP.     Patient was initially seen at [facility] 7/2018 with a history of obstructive sleep apnea unknown severity by sleep studies in [place] in 2000s. She had been off CPAP since about 2016 due to not liking large 'smothering interface', with symptoms of nocturnal gasping, excessive daytime sleepiness (ESS 16) 1. Comorbid hypertension, aortic insufficiency.      Study Date: 8/17/2018 (154.0 lbs)   - It was frequently difficult to discern between RERAs and PLMs  -The combined apnea/hypopnea index was 19.9 events per hour (central apnea/hypopnea index was 0.5 events per hour). The REM AHI was 74.4 events per hour. The supine AHI was n/a. The RERA index was 29.1 events per hour.  The RDI was 49.0 events per hour.  -Lowest oxygen saturation was 72.6%. Time spent less than or equal to 88% was 27.0 minutes. Time spent less than or equal to 89% was 36.1 minutes.  -The PLM index was 93.8 movements per hour. The PLM Arousal Index was 40.7 per hour.    Elected autocpap 5-15   She will need follow-up oximetry at some point    Overall, she rates the experience with PAP as 8 (0 poor, 10 great). The mask is not comfortable.  The mask is uncomfortable because of \"mouth open cant swallow\".  The mask is leaking at her nose pillow come out easily.  The mask is leaking 7 nights per week.  She is not snoring with the mask on. She is having gasp arousals.  She is having significant oral/nasal dryness. The pressure is comfortable.     Her PAP interface is Nasal Pillows.    Bedtime is typically 2200. Usually it takes about 15 min minutes to fall asleep with the mask on. Wake time is typically 0700.  Patient is using PAP therapy 5 hours per night. The patient is usually getting 8 hours of sleep per night.    She does feel rested " in the morning. She sleeps on her side.     Total score - [place]: 5 (3/20/2019  8:00 AM)  CONSTANZA Total Score: 13      ResMed   Auto-PAP 5.0 - 15.0 cmH2O 30 day usage data:    96% of days with > 4 hours of use. 0/30 days with no use.   Average use 415 minutes per day.   95%ile Leak 25.84 L/min.   CPAP 95% pressure 11.7 cm.   AHI 2.49 events per hour.         Past medical/surgical history, family history, social history, medications and allergies were reviewed.      Problem List:  Patient Active Problem List    Diagnosis Date Noted     Malignant neoplasm of skin of face 09/20/2018     Priority: Medium     Sicca syndrome (H) 08/23/2018     Priority: Medium     ANH positive 08/23/2018     Priority: Medium     Primary osteoarthritis involving multiple joints 08/23/2018     Priority: Medium     Hypertension goal BP (blood pressure) < 150/90 11/14/2017     Priority: Medium     Neurocardiogenic syncope 11/14/2017     Priority: Medium     Mixed hyperlipidemia 11/14/2017     Priority: Medium     MANNY (obstructive sleep apnea)- 'moderate' (AHI 19) 11/14/2017     Priority: Medium     Initial diagnosis ?[address] [place] [address].   Study Date: 8/17/2018- (154.0 lbs) It was frequently difficult to discern between RERAs and PLMs. The combined apnea/hypopnea index was 19.9 events per hour (central apnea/hypopnea index was 0.5 events per hour). The REM AHI was 74.4 events per hour. The supine AHI was n/a.  RDI was 49.0 events per hour. Baseline oxygen saturation was 91.6%. Lowest oxygen saturation was 72.6%. Time spent less than or equal to 88% was 27.0 minutes. Time spent less than or equal to 89% was 36.1 minutes. The PLM index was 93.8 movements per hour. The PLM Arousal Index was 40.7 per hour.       Nonrheumatic aortic valve insufficiency 11/14/2017     Priority: Medium     Moderate AI by echo 4/16       Anxiety 11/14/2017     Priority: Medium     Advanced directives, counseling/discussion 11/14/2017     Priority: Low     Discussed Advance  Directive planning with patient; information given to patient to review       Gastroesophageal reflux disease without esophagitis 11/14/2017     Priority: Low        /78   Pulse 77   Ht 1.524 m (5')   Wt 67.6 kg (149 lb)   SpO2 96%   BMI 29.10 kg/m      Impression/Plan:    Moderate Sleep apnea.  Tolerating PAP well. Daytime symptoms are improved.   Mouth coming open and having oral dryness, gasping  - Narrow pressures to 7-13 cmH20  - See DME about how to adjust humidity  - See  DME about chin strap vs full-face mask  - May need to have re-education with saritater present  - Check overnight oximetry    June Yves will follow up in about 2 year(s).     Twenty-five minutes spent with patient, all of which were spent face-to-face counseling, consulting, coordinating plan of care.

## 2019-03-21 ENCOUNTER — DOCUMENTATION ONLY (OUTPATIENT)
Dept: SLEEP MEDICINE | Facility: CLINIC | Age: 79
End: 2019-03-21
Payer: MEDICARE

## 2019-03-21 DIAGNOSIS — G47.33 OSA (OBSTRUCTIVE SLEEP APNEA): ICD-10-CM

## 2019-03-26 NOTE — RESULT ENCOUNTER NOTE
Overnight oximetery (3/20/19) looks bad  KEN 3.8. Lowest O2 72 %, Sao2 <88% for 122 minutes   Some of the low O2 appears artifactual, and some of the artifact may have occurred if she was OFF PAP at the end of the night  Recommend repeat oximetry or polysomnogram with all night titration

## 2019-04-24 DIAGNOSIS — K21.9 GASTROESOPHAGEAL REFLUX DISEASE WITHOUT ESOPHAGITIS: Primary | Chronic | ICD-10-CM

## 2019-04-24 RX ORDER — PANTOPRAZOLE SODIUM 40 MG/1
40 TABLET, DELAYED RELEASE ORAL DAILY
Qty: 90 TABLET | Refills: 0 | Status: SHIPPED | OUTPATIENT
Start: 2019-04-24 | End: 2019-07-24

## 2019-04-24 NOTE — TELEPHONE ENCOUNTER
Routing refill request to provider for review/approval because:  Medication is reported/historical  Solange Aguiar BSN, RN

## 2019-05-15 ENCOUNTER — TRANSFERRED RECORDS (OUTPATIENT)
Dept: HEALTH INFORMATION MANAGEMENT | Facility: CLINIC | Age: 79
End: 2019-05-15

## 2019-05-15 ENCOUNTER — TELEPHONE (OUTPATIENT)
Dept: FAMILY MEDICINE | Facility: CLINIC | Age: 79
End: 2019-05-15

## 2019-05-15 DIAGNOSIS — I10 HYPERTENSION GOAL BP (BLOOD PRESSURE) < 150/90: ICD-10-CM

## 2019-05-15 RX ORDER — VERAPAMIL HYDROCHLORIDE 180 MG/1
180 TABLET, EXTENDED RELEASE ORAL 2 TIMES DAILY
Qty: 60 TABLET | Refills: 0 | Status: SHIPPED | OUTPATIENT
Start: 2019-05-15 | End: 2019-05-29

## 2019-05-15 NOTE — TELEPHONE ENCOUNTER
Drug name: Verapamil ER (SR) 180mg/12hr tab. Message: Pt req we contact you regarding possible alt. This change will save pt up to $1583.24 per yr! Pt is req lower cost alt to the Verapamil ER 360mg Cap- est copay is $403.81. Verapamil ER 180mg 12HR/tab = 1BID $8.00-please send new rx.

## 2019-05-24 ENCOUNTER — TELEPHONE (OUTPATIENT)
Dept: FAMILY MEDICINE | Facility: CLINIC | Age: 79
End: 2019-05-24

## 2019-05-24 DIAGNOSIS — I10 HYPERTENSION GOAL BP (BLOOD PRESSURE) < 150/90: ICD-10-CM

## 2019-05-24 NOTE — TELEPHONE ENCOUNTER
Letter reviewed.   Letter from [facility] [phone], states pt is on Verapamil ER 360mg/24 hr cap, and that Verapamil ER (SR) 180mg/12hr tab may be cheaper.    This was already addressed via 5/15/19 telephone encounter. Pt was changed to the 180mg dose twice daily instead of the 360mg once daily product.  See 5/15/19 telephone encounter.    Attempted to reach pt. There was no answer. No voicemail available to leave a message. Will attempt to reach pt again next week.    Kendra Arrington, BSN, RN

## 2019-05-24 NOTE — TELEPHONE ENCOUNTER
Patient came in with a letter from [facility]. She was taking  Prescription but the price went up, so she wants to switch to a generic one. [facility] needs verification so they can charge her for it.     Verapamil ER (SR) 180MG/12HR    Can be faxed [phone]  Or called in [phone]    Please call patient with any questions or refer to the letter.     Thank you!

## 2019-05-29 ENCOUNTER — ALLIED HEALTH/NURSE VISIT (OUTPATIENT)
Dept: FAMILY MEDICINE | Facility: CLINIC | Age: 79
End: 2019-05-29
Payer: MEDICARE

## 2019-05-29 VITALS — DIASTOLIC BLOOD PRESSURE: 58 MMHG | HEART RATE: 63 BPM | SYSTOLIC BLOOD PRESSURE: 126 MMHG

## 2019-05-29 DIAGNOSIS — Z01.30 BLOOD PRESSURE CHECK: Primary | ICD-10-CM

## 2019-05-29 PROCEDURE — 99207 ZZC NO CHARGE NURSE ONLY: CPT | Performed by: FAMILY MEDICINE

## 2019-05-29 RX ORDER — VERAPAMIL HYDROCHLORIDE 180 MG/1
180 TABLET, EXTENDED RELEASE ORAL 2 TIMES DAILY
Qty: 180 TABLET | Refills: 0 | Status: SHIPPED | OUTPATIENT
Start: 2019-05-29 | End: 2019-07-24

## 2019-05-29 NOTE — TELEPHONE ENCOUNTER
Prescription sent to local pharmacy 2 weeks ago.  Patient needs follow up blood pressure check prior to refilling new prescription dose with mail order.    Patient will come to clinic today to have blood pressure checked at pharmacy and then can address refills.  Patient/parent verbalized understanding of instructions provided and agreed with the plan of care    Jada Humphrey RN

## 2019-05-29 NOTE — TELEPHONE ENCOUNTER
"BP Readings from Last 6 Encounters:   05/29/19 126/58   03/20/19 130/78   01/14/19 122/75   09/20/18 129/69   08/29/18 119/72   08/23/18 111/65     Patient has history of Vertigo, so unable to say if she is having any dizziness or lightheadedness.  No change since starting medication.  Already is conscientious about changing position, walking etc.  Patient reports may not be drinking water/fluid enough.  \"I don't feel thirsty so I don't drink\".   Good hydration is indicated when your urine it is clear like water or light straw color.   Patient states she will work on this.    Verbalized good understanding.       Patient is requesting a refill of the new dose of  Verapamil be sent to mail order pharmacy for 90 days.  To Provider pool to please address in providers absence.     Jada Humphrey RN     "

## 2019-07-21 NOTE — PATIENT INSTRUCTIONS
Citalopram (Celexa) 20 mg daily is a medication you are currently taking for anxiety.  If you are wanting to stop this medication, I recommend you take 1/2 tablet daily for the next 2 weeks and then try stopping the medication completely.  If you find you feel better taking the medication, please call the office and we will send it to your pharmacy.

## 2019-07-24 ENCOUNTER — OFFICE VISIT (OUTPATIENT)
Dept: FAMILY MEDICINE | Facility: CLINIC | Age: 79
End: 2019-07-24
Payer: MEDICARE

## 2019-07-24 VITALS
HEART RATE: 79 BPM | HEIGHT: 60 IN | TEMPERATURE: 99.6 F | SYSTOLIC BLOOD PRESSURE: 109 MMHG | DIASTOLIC BLOOD PRESSURE: 63 MMHG | WEIGHT: 152.8 LBS | BODY MASS INDEX: 30 KG/M2

## 2019-07-24 DIAGNOSIS — I10 HYPERTENSION GOAL BP (BLOOD PRESSURE) < 150/90: Primary | Chronic | ICD-10-CM

## 2019-07-24 DIAGNOSIS — J30.9 ALLERGIC RHINITIS, UNSPECIFIED SEASONALITY, UNSPECIFIED TRIGGER: ICD-10-CM

## 2019-07-24 DIAGNOSIS — K21.9 GASTROESOPHAGEAL REFLUX DISEASE WITHOUT ESOPHAGITIS: Chronic | ICD-10-CM

## 2019-07-24 DIAGNOSIS — E78.2 MIXED HYPERLIPIDEMIA: Chronic | ICD-10-CM

## 2019-07-24 DIAGNOSIS — F41.9 ANXIETY: Chronic | ICD-10-CM

## 2019-07-24 DIAGNOSIS — G45.9 TRANSIENT CEREBRAL ISCHEMIA, UNSPECIFIED TYPE: ICD-10-CM

## 2019-07-24 DIAGNOSIS — N18.30 CKD (CHRONIC KIDNEY DISEASE) STAGE 3, GFR 30-59 ML/MIN (H): ICD-10-CM

## 2019-07-24 DIAGNOSIS — G47.33 OSA (OBSTRUCTIVE SLEEP APNEA): Chronic | ICD-10-CM

## 2019-07-24 LAB
ANION GAP SERPL CALCULATED.3IONS-SCNC: 7 MMOL/L (ref 3–14)
BUN SERPL-MCNC: 23 MG/DL (ref 7–30)
CALCIUM SERPL-MCNC: 9.6 MG/DL (ref 8.5–10.1)
CHLORIDE SERPL-SCNC: 103 MMOL/L (ref 94–109)
CO2 SERPL-SCNC: 28 MMOL/L (ref 20–32)
CREAT SERPL-MCNC: 1.02 MG/DL (ref 0.52–1.04)
GFR SERPL CREATININE-BSD FRML MDRD: 52 ML/MIN/{1.73_M2}
GLUCOSE SERPL-MCNC: 84 MG/DL (ref 70–99)
POTASSIUM SERPL-SCNC: 4.4 MMOL/L (ref 3.4–5.3)
SODIUM SERPL-SCNC: 138 MMOL/L (ref 133–144)

## 2019-07-24 PROCEDURE — 99214 OFFICE O/P EST MOD 30 MIN: CPT | Performed by: NURSE PRACTITIONER

## 2019-07-24 PROCEDURE — 36415 COLL VENOUS BLD VENIPUNCTURE: CPT | Performed by: NURSE PRACTITIONER

## 2019-07-24 PROCEDURE — 80048 BASIC METABOLIC PNL TOTAL CA: CPT | Performed by: NURSE PRACTITIONER

## 2019-07-24 RX ORDER — PANTOPRAZOLE SODIUM 40 MG/1
40 TABLET, DELAYED RELEASE ORAL DAILY
Qty: 90 TABLET | Refills: 1 | Status: SHIPPED | OUTPATIENT
Start: 2019-07-24 | End: 2020-01-29

## 2019-07-24 RX ORDER — POTASSIUM CHLORIDE 1500 MG/1
TABLET, EXTENDED RELEASE ORAL
Qty: 90 TABLET | Refills: 1 | Status: SHIPPED | OUTPATIENT
Start: 2019-07-24 | End: 2020-01-29

## 2019-07-24 RX ORDER — LISINOPRIL AND HYDROCHLOROTHIAZIDE 20; 25 MG/1; MG/1
TABLET ORAL
Qty: 90 TABLET | Refills: 3 | Status: SHIPPED | OUTPATIENT
Start: 2019-07-24 | End: 2020-01-29

## 2019-07-24 RX ORDER — ROSUVASTATIN CALCIUM 5 MG/1
TABLET, COATED ORAL
Qty: 90 TABLET | Refills: 1 | Status: SHIPPED | OUTPATIENT
Start: 2019-07-24 | End: 2020-01-02

## 2019-07-24 RX ORDER — CLOPIDOGREL BISULFATE 75 MG/1
TABLET ORAL
Qty: 90 TABLET | Refills: 3 | Status: SHIPPED | OUTPATIENT
Start: 2019-07-24 | End: 2020-01-29

## 2019-07-24 RX ORDER — LORATADINE 10 MG/1
10 TABLET ORAL DAILY
Qty: 90 TABLET | Refills: 3 | Status: SHIPPED | OUTPATIENT
Start: 2019-07-24 | End: 2020-01-29

## 2019-07-24 RX ORDER — VERAPAMIL HYDROCHLORIDE 180 MG/1
180 TABLET, EXTENDED RELEASE ORAL 2 TIMES DAILY
Qty: 180 TABLET | Refills: 3 | Status: SHIPPED | OUTPATIENT
Start: 2019-07-24 | End: 2020-01-29

## 2019-07-24 ASSESSMENT — ENCOUNTER SYMPTOMS
PARESTHESIAS: 0
JOINT SWELLING: 0
DYSURIA: 0
HEADACHES: 0
BREAST MASS: 0
HEARTBURN: 0
AGITATION: 0
APPETITE CHANGE: 0
ARTHRALGIAS: 1
NAUSEA: 0
CONSTIPATION: 0
EYE PAIN: 0
SHORTNESS OF BREATH: 0
COUGH: 0
WEAKNESS: 0
SORE THROAT: 0
ABDOMINAL PAIN: 0
FREQUENCY: 0
FEVER: 0
HEMATOCHEZIA: 0
CHILLS: 0
DIZZINESS: 1
DIARRHEA: 0
PALPITATIONS: 0
NERVOUS/ANXIOUS: 0
MYALGIAS: 0

## 2019-07-24 ASSESSMENT — PATIENT HEALTH QUESTIONNAIRE - PHQ9: SUM OF ALL RESPONSES TO PHQ QUESTIONS 1-9: 3

## 2019-07-24 ASSESSMENT — MIFFLIN-ST. JEOR: SCORE: 1088.57

## 2019-07-24 NOTE — LETTER
July 29, 2019 June Yves  [address] [facility]   [place] [address]            Dear June,    Your lab work is stable.  Kidney function is unchanged and electrolytes are normal.    If you have any questions or concerns, please call myself or my nurse at [phone].    Sincerely,    Leeanne Cunha NP/[facility]    Results for orders placed or performed in visit on 07/24/19   BASIC METABOLIC PANEL   Result Value Ref Range    Sodium 138 133 - 144 mmol/L    Potassium 4.4 3.4 - 5.3 mmol/L    Chloride 103 94 - 109 mmol/L    Carbon Dioxide 28 20 - 32 mmol/L    Anion Gap 7 3 - 14 mmol/L    Glucose 84 70 - 99 mg/dL    Urea Nitrogen 23 7 - 30 mg/dL    Creatinine 1.02 0.52 - 1.04 mg/dL    GFR Estimate 52 (L) >60 mL/min/[1.73_m2]    GFR Estimate If Black 60 (L) >60 mL/min/[1.73_m2]    Calcium 9.6 8.5 - 10.1 mg/dL

## 2019-07-24 NOTE — PROGRESS NOTES
Subjective     Swati Marinelli is a 79 year old female who presents to clinic today for the following health issues:    HPI   Hyperlipidemia Follow-Up      Are you having any of the following symptoms? (Select all that apply)  No complaints of shortness of breath, chest pain or pressure.  No increased sweating or nausea with activity.  No left-sided neck or arm pain.  No complaints of pain in calves when walking 1-2 blocks.    Are you regularly taking any medication or supplement to lower your cholesterol?   Yes- Rosuvastatin 5 mg daily    Are you having muscle aches or other side effects that you think could be caused by your cholesterol lowering medication?  No      Hypertension Follow-up      Do you check your blood pressure regularly outside of the clinic? Yes Once weekly at home.  Does not keep a log of readings.     Are your blood pressures ever more than 140 on the top number (systolic) OR more   than 90 on the bottom number (diastolic), for example 140/90? No   - She does note some dizziness which she has been battling for over 2 years and sees a Vestibular Therapist for.      Anxiety Follow-Up    How are you doing with your anxiety since your last visit? Improved.  States she feels her mood and anxiety is very well controlled and is wanting to try stopping the Celexa, which she has taken for years.      Are you having other symptoms that might be associated with anxiety? No    Have you had a significant life event? OTHER: Moved to [place] 1.5 years ago but states overall she feels she is doing well.      Are you feeling depressed? No    Do you have any concerns with your use of alcohol or other drugs? No    Social History     Tobacco Use     Smoking status: Never Smoker     Smokeless tobacco: Never Used   Substance Use Topics     Alcohol use: No     Drug use: No     PHQ9 (7/24/19): Score 3    Chronic Kidney Disease Follow-up      Current NSAID use?  No    Amount of exercise or physical activity: 2-3 days/week for an  average of 30-45 minutes    Problems taking medications regularly: No    Medication side effects: none    Diet: regular (no restrictions)    Transient Cerebral Ischemia- Compliant with Plavix daily.  Denies any episodes of bleeding or significant bruising.    MANNY- compliant with CPAP use- will see Sleep [facility] this Friday. Having issues with mask.     GERD-- reports stable with Protonix.  States occasionally has trouble with swallowing food, denies choking episodes.  States she often eats too fast.  Denies difficulty swallowing medications.     Elevated Creatinine 1.05 (1/2019).  Cr 0.95 (7/2018).  Due for recheck today.     Patient Active Problem List   Diagnosis     Advanced directives, counseling/discussion     Hypertension goal BP (blood pressure) < 150/90     Neurocardiogenic syncope     Mixed hyperlipidemia     MANNY (obstructive sleep apnea)- 'moderate' (AHI 19)     Nonrheumatic aortic valve insufficiency     Anxiety     Gastroesophageal reflux disease without esophagitis     Sicca syndrome (H)     ANH positive     Primary osteoarthritis involving multiple joints     Malignant neoplasm of skin of face     CKD (chronic kidney disease) stage 3, GFR 30-59 ml/min (H)     Past Surgical History:   Procedure Laterality Date     APPENDECTOMY OPEN CHILD  1951     C TOTAL KNEE ARTHROPLASTY Left 2011     C TOTAL KNEE ARTHROPLASTY Left 2013    revision     CARPAL TUNNEL RELEASE RT/LT Right 1995 1990s     CATARACT IOL, RT/LT  2009    right 2/4/09, left 2/21/09     CHOLECYSTECTOMY  06/2012     COLONOSCOPY  2012    normal     MOHS MICROGRAPHIC PROCEDURE  2011    back - 2011, lip and back 5/2012     ROTATOR CUFF REPAIR RT/LT Right 2009     ROTATOR CUFF REPAIR RT/LT Left 04/20/2016     TONSILLECTOMY  1945       Social History     Tobacco Use     Smoking status: Never Smoker     Smokeless tobacco: Never Used   Substance Use Topics     Alcohol use: No     Family History   Problem Relation Age of Onset     Tuberculosis Mother       Myocardial Infarction Father      Coronary Artery Disease Father      Myocardial Infarction Brother      Coronary Artery Disease Brother      Heart Surgery Daughter      Diabetes No family hx of      Colon Cancer No family hx of      Breast Cancer No family hx of          Current Outpatient Medications   Medication Sig Dispense Refill     citalopram (CELEXA) 20 MG tablet TAKE 1 TABLET (20 MG) BY MOUTH DAILY (Patient taking differently: 10 mg TAKE 1 TABLET (20 MG) BY MOUTH DAILY) 90 tablet 1     clopidogrel (PLAVIX) 75 MG tablet TAKE 1 TABLET (75 MG) BY MOUTH DAILY 90 tablet 3     lisinopril-hydrochlorothiazide (PRINZIDE/ZESTORETIC) 20-25 MG tablet TAKE 1 TABLET EVERY DAY 90 tablet 3     loratadine (CLARITIN) 10 MG tablet Take 1 tablet (10 mg) by mouth daily 90 tablet 3     meclizine (ANTIVERT) 25 MG tablet Take 1 tablet (25 mg) by mouth every 6 hours as needed for dizziness (Patient not taking: Reported on 8/1/2019) 30 tablet 1     order for [facility] autoCPAP 7-13 cmH20 1 Device 0     pantoprazole (PROTONIX) 40 MG EC tablet Take 1 tablet (40 mg) by mouth daily 90 tablet 1     potassium chloride ER (KLOR-CON) 20 MEQ CR tablet TAKE 1 TABLET EVERY DAY 90 tablet 1     rosuvastatin (CRESTOR) 5 MG tablet TAKE 1 TABLET EVERY DAY 90 tablet 1     verapamil ER (CALAN-SR) 180 MG CR tablet Take 1 tablet (180 mg) by mouth 2 times daily 180 tablet 3     Allergies   Allergen Reactions     Aleve [Naproxen]      Asa [Aspirin]      Codeine      Doxycycline      Elavil [Amitriptyline]      Etodolac      Gabapentin      Lyrica [Pregabalin]      Mushroom      Oxycodone      Oxycodone-Acetaminophen      Vicodin [Hydrocodone-Acetaminophen]        Reviewed and updated as needed this visit by Provider  Tobacco  Allergies  Meds  Problems  Med Hx  Surg Hx  Fam Hx         Review of Systems   Constitutional: Negative for appetite change, chills and fever.   HENT: Positive for hearing loss (wears hearing aids). Negative for congestion, ear  "pain and sore throat.    Eyes: Negative for pain and visual disturbance.   Respiratory: Negative for cough and shortness of breath.    Cardiovascular: Negative for chest pain, palpitations and peripheral edema.   Gastrointestinal: Negative for abdominal pain, constipation, diarrhea, heartburn, hematochezia and nausea.   Endocrine: Polyuria: followed by Vestibular Therapy for 2 years.   Breasts:  Negative for tenderness, breast mass and discharge.   Genitourinary: Negative for dysuria, frequency and pelvic pain.   Musculoskeletal: Positive for arthralgias. Negative for joint swelling and myalgias.   Skin: Negative for rash.   Neurological: Positive for dizziness. Negative for weakness, headaches and paresthesias.   Psychiatric/Behavioral: Negative for agitation and mood changes. The patient is not nervous/anxious.             Objective    /63   Pulse 79   Temp 99.6  F (37.6  C) (Oral)   Ht 1.53 m (5' 0.25\")   Wt 69.3 kg (152 lb 12.8 oz)   BMI 29.59 kg/m    Body mass index is 29.59 kg/m .  Physical Exam   Constitutional: She is oriented to person, place, and time. Vital signs are normal. She appears well-developed and well-nourished. No distress.   HENT:   Head: Normocephalic and atraumatic.   Right Ear: Tympanic membrane normal.   Left Ear: Tympanic membrane normal.   Nose: Nose normal.   Mouth/Throat: Uvula is midline, oropharynx is clear and moist and mucous membranes are normal.   Eyes: Pupils are equal, round, and reactive to light. Conjunctivae and lids are normal.   Neck: Normal range of motion. Neck supple. No tracheal deviation present. No thyromegaly present.   Cardiovascular: Normal rate and regular rhythm.   Pulmonary/Chest: Effort normal and breath sounds normal.   Abdominal: Soft. Bowel sounds are normal. She exhibits no distension. There is no tenderness.   Musculoskeletal: Normal range of motion.   Neurological: She is alert and oriented to person, place, and time.   Skin: Skin is warm and " dry.   Psychiatric: She has a normal mood and affect. Her speech is normal and behavior is normal. Judgment and thought content normal.   Appropriate in conversation with good eye contact.  Smiling throughout exam.         Diagnostic Test Results:  Labs reviewed in Epic        Assessment & Plan     1. Hypertension goal BP (blood pressure) < 150/90  - BASIC METABOLIC PANEL  - lisinopril-hydrochlorothiazide (PRINZIDE/ZESTORETIC) 20-25 MG tablet; TAKE 1 TABLET EVERY DAY  Dispense: 90 tablet; Refill: 3  - potassium chloride ER (KLOR-CON) 20 MEQ CR tablet; TAKE 1 TABLET EVERY DAY  Dispense: 90 tablet; Refill: 1  - verapamil ER (CALAN-SR) 180 MG CR tablet; Take 1 tablet (180 mg) by mouth 2 times daily  Dispense: 180 tablet; Refill: 3  - Encouraged to continue to monitor BP at home. Keep log.     2. Mixed hyperlipidemia  - rosuvastatin (CRESTOR) 5 MG tablet; TAKE 1 TABLET EVERY DAY  Dispense: 90 tablet; Refill: 1    3. Anxiety  - Currently taking Celexa 20 mg daily.  She has requested to try and come off the Celexa.  I recommend she take Celexa 10 mg daily x 2 weeks and then try stopping the Celexa completely.  Advised if she feels her anxiety returning or felt better when taking the medication, then call the office and we can send in a prescription.      4. Gastroesophageal reflux disease without esophagitis  - stable  - pantoprazole (PROTONIX) 40 MG EC tablet; Take 1 tablet (40 mg) by mouth daily  Dispense: 90 tablet; Refill: 1    5. MANNY (obstructive sleep apnea)- 'moderate' (AHI 19)  - compliant with CPAP use.     6. Transient cerebral ischemia, unspecified type  - clopidogrel (PLAVIX) 75 MG tablet; TAKE 1 TABLET (75 MG) BY MOUTH DAILY  Dispense: 90 tablet; Refill: 3    7. CKD (chronic kidney disease) stage 3, GFR 30-59 ml/min (H)  - She avoids all NSAIDs due to allergy.  Encouraged adequate water intake.   - BASIC METABOLIC PANEL    8. Allergic rhinitis, unspecified seasonality, unspecified trigger  - stable  -  "loratadine (CLARITIN) 10 MG tablet; Take 1 tablet (10 mg) by mouth daily  Dispense: 90 tablet; Refill: 3     BMI:   Estimated body mass index is 29.59 kg/m  as calculated from the following:    Height as of this encounter: 1.53 m (5' 0.25\").    Weight as of this encounter: 69.3 kg (152 lb 12.8 oz).   Weight management plan: Discussed healthy diet and exercise guidelines  - continue Exercise 3 x per week. Encouraged walking daily.       Return in about 3 months (around 10/24/2019) for Medicare Annual Wellness Visit.    Leeanne Cunha NP  [facility]    "

## 2019-07-26 ENCOUNTER — DOCUMENTATION ONLY (OUTPATIENT)
Dept: SLEEP MEDICINE | Facility: CLINIC | Age: 79
End: 2019-07-26
Payer: MEDICARE

## 2019-07-26 DIAGNOSIS — G47.33 OSA (OBSTRUCTIVE SLEEP APNEA): Primary | ICD-10-CM

## 2019-07-26 NOTE — Clinical Note
Patient switched to Full Face mask due to mouth breathing and Pillow mask not staying in place (and not liking chin strap) - Please sign order.  Thank you!

## 2019-07-26 NOTE — PROGRESS NOTES
Patient came to [facility] for mask fitting appointment on July 26, 2019. Patient requested to switch masks because oral breathing and Pillow mask not staying in place.  Patient tried on the following Full Face masks: [employer] Simplus (Small), Saritha RespirMind Candys Dreamwear (Small/Small).  Patient selected a [employer] Mask name: Simplus Full Face mask size Small.  Patient also received heated tubing, filters and water chamber.

## 2019-07-31 NOTE — PROGRESS NOTES
SUBJECTIVE:   Swati Marinelli is here in follow up of bilateral hip greater trochanteric bursitis and right hip osteoarthritis.    Last injection(s):   1. The patient had 4-5 previous hip steroid injections receiving them every 3-4 months in [place].  2. Bilateral Hip greater trochanteric bursal steroid injection on 04/05/18. Length of effectiveness: 3 months, and 8/23/18. Length of effectiveness: 3-4 months, but wanted to wait as long as possible.  Physical therapy ordered-- didn't do that, but does exercise classes.    Review of Systems:  Constitutional/General: Negative for fever, chills, change in weight  Integumentary/Skin: Negative for worrisome rashes, moles, or lesions  Neuro: Negative for weakness, dizziness, or paresthesias   Psychiatric: negative for changes in mood or affect    OBJECTIVE:  Physical Exam:  /73 (BP Location: Right arm, Patient Position: Sitting, Cuff Size: Adult Regular)   Pulse 91   Wt 69 kg (152 lb 3.2 oz)   SpO2 96%   BMI 29.48 kg/m    General Appearance: healthy, alert and no distress   Skin: no suspicious lesions or rashes  Neuro: Normal strength and tone, mentation intact and speech normal  Vascular: good pulses, and capillary refill   Lymph: no lymphadenopathy   Psych:  mentation appears normal and affect normal/bright  Resp: no increased work of breathing    Bilateral Hip Exam:    Left Hip Right Hip   ROM: Flexion Full   110* degrees    ROM: Internal Rotation 20 degrees  10* degrees    ROM: External Rotation 40 degrees  45 degrees    ROM: Abduction full 35 degrees   Strength Good flexion and abduction strength Good flexion and abduction strength     Special tests: Trendelenburg's: negative    X-rays:  From 08/21/18,  moderate hypertrophic changes in both hips, osteophytes on the acetabulum and femoral head, and significant calcification on the greater trochanters bilaterally.    ASSESSMENT:   1. Greater trochanteric bursitis, bilateral hips  2. Moderate OA, bilateral hips  The  patient does say that she has groin pain occasionally in both hips.    PLAN:   We decided to proceed with repeat bilateral greater trochanteric bursal injection.  Discussed the risks of repeat steroid injections to a certain area and I urged the patient to wait more than 6 months between injections, if at all possible.    Doesn't want to do physical therapy.  Has a bad taste in her mouth with physical therapy.    Procedure Note:  With the patient's consent, bilateral hips injected in the greater trochanteric bursa with 80mg of Depomedrol and 2cc of local anesthetic after sterile prep.    Return to clinic: CHINO Heller MD  Dept. Orthopedic Surgery  [facility]

## 2019-08-01 ENCOUNTER — OFFICE VISIT (OUTPATIENT)
Dept: ORTHOPEDICS | Facility: CLINIC | Age: 79
End: 2019-08-01
Payer: MEDICARE

## 2019-08-01 VITALS
SYSTOLIC BLOOD PRESSURE: 119 MMHG | OXYGEN SATURATION: 96 % | HEART RATE: 91 BPM | WEIGHT: 152.2 LBS | DIASTOLIC BLOOD PRESSURE: 73 MMHG | BODY MASS INDEX: 29.48 KG/M2

## 2019-08-01 DIAGNOSIS — M70.61 TROCHANTERIC BURSITIS OF BOTH HIPS: Primary | ICD-10-CM

## 2019-08-01 DIAGNOSIS — M16.0 PRIMARY OSTEOARTHRITIS OF BOTH HIPS: ICD-10-CM

## 2019-08-01 DIAGNOSIS — M70.62 TROCHANTERIC BURSITIS OF BOTH HIPS: Primary | ICD-10-CM

## 2019-08-01 PROCEDURE — 99213 OFFICE O/P EST LOW 20 MIN: CPT | Mod: 25 | Performed by: ORTHOPAEDIC SURGERY

## 2019-08-01 PROCEDURE — 20610 DRAIN/INJ JOINT/BURSA W/O US: CPT | Mod: 50 | Performed by: ORTHOPAEDIC SURGERY

## 2019-08-01 RX ORDER — LIDOCAINE HYDROCHLORIDE 10 MG/ML
1 INJECTION, SOLUTION INFILTRATION; PERINEURAL
Status: SHIPPED | OUTPATIENT
Start: 2019-08-01

## 2019-08-01 RX ORDER — METHYLPREDNISOLONE ACETATE 80 MG/ML
80 INJECTION, SUSPENSION INTRA-ARTICULAR; INTRALESIONAL; INTRAMUSCULAR; SOFT TISSUE
Status: SHIPPED | OUTPATIENT
Start: 2019-08-01

## 2019-08-01 RX ADMIN — LIDOCAINE HYDROCHLORIDE 1 ML: 10 INJECTION, SOLUTION INFILTRATION; PERINEURAL at 11:17

## 2019-08-01 RX ADMIN — METHYLPREDNISOLONE ACETATE 80 MG: 80 INJECTION, SUSPENSION INTRA-ARTICULAR; INTRALESIONAL; INTRAMUSCULAR; SOFT TISSUE at 11:17

## 2019-08-01 ASSESSMENT — PAIN SCALES - GENERAL: PAINLEVEL: SEVERE PAIN (7)

## 2019-08-01 NOTE — PROGRESS NOTES
Large Joint Injection/Arthocentesis: bilateral greater trochanteric bursa  Date/Time: 8/1/2019 11:17 AM  Performed by: Castillo Heller MD  Authorized by: Castillo Heller MD     Indications:  Pain  Needle Size:  22 G  Guidance: landmark guided    Approach:  Medial  Location:  Hip  Laterality:  Bilateral      Site:  Bilateral greater trochanteric bursa  Medications (Right):  80 mg methylPREDNISolone 80 MG/ML; 1 mL lidocaine 1 %  Medications (Left):  80 mg methylPREDNISolone 80 MG/ML; 1 mL lidocaine 1 %  Consent Given by:  Patient  Prep: patient was prepped and draped in usual sterile fashion

## 2019-08-01 NOTE — LETTER
8/1/2019         RE: Swati Marinelli  [id number]  [place] [address]        Dear Colleague,    Thank you for referring your patient, Swati Marinelli, to the [facility]. Please see a copy of my visit note below.    SUBJECTIVE:   Swati Marinelli is here in follow up of bilateral hip greater trochanteric bursitis and right hip osteoarthritis.    Last injection(s):   1. The patient had 4-5 previous hip steroid injections receiving them every 3-4 months in [place].  2. Bilateral Hip greater trochanteric bursal steroid injection on 04/05/18. Length of effectiveness: 3 months, and 8/23/18. Length of effectiveness: 3-4 months, but wanted to wait as long as possible.  Physical therapy ordered-- didn't do that, but does exercise classes.    Review of Systems:  Constitutional/General: Negative for fever, chills, change in weight  Integumentary/Skin: Negative for worrisome rashes, moles, or lesions  Neuro: Negative for weakness, dizziness, or paresthesias   Psychiatric: negative for changes in mood or affect    OBJECTIVE:  Physical Exam:  /73 (BP Location: Right arm, Patient Position: Sitting, Cuff Size: Adult Regular)   Pulse 91   Wt 69 kg (152 lb 3.2 oz)   SpO2 96%   BMI 29.48 kg/m     General Appearance: healthy, alert and no distress   Skin: no suspicious lesions or rashes  Neuro: Normal strength and tone, mentation intact and speech normal  Vascular: good pulses, and capillary refill   Lymph: no lymphadenopathy   Psych:  mentation appears normal and affect normal/bright  Resp: no increased work of breathing    Bilateral Hip Exam:    Left Hip Right Hip   ROM: Flexion Full   110* degrees    ROM: Internal Rotation 20 degrees  10* degrees    ROM: External Rotation 40 degrees  45 degrees    ROM: Abduction full 35 degrees   Strength Good flexion and abduction strength Good flexion and abduction strength     Special tests: Trendelenburg's: negative    X-rays:  From 08/21/18,  moderate hypertrophic  changes in both hips, osteophytes on the acetabulum and femoral head, and significant calcification on the greater trochanters bilaterally.    ASSESSMENT:   1. Greater trochanteric bursitis, bilateral hips  2. Moderate OA, bilateral hips  The patient does say that she has groin pain occasionally in both hips.    PLAN:   We decided to proceed with repeat bilateral greater trochanteric bursal injection.  Discussed the risks of repeat steroid injections to a certain area and I urged the patient to wait more than 6 months between injections, if at all possible.    Doesn't want to do physical therapy.  Has a bad taste in her mouth with physical therapy.    Procedure Note:  With the patient's consent, bilateral hips injected in the greater trochanteric bursa with 80mg of Depomedrol and 2cc of local anesthetic after sterile prep.    Return to clinic: CHINO Heller MD  Dept. Orthopedic Surgery  [facility]       Large Joint Injection/Arthocentesis: bilateral greater trochanteric bursa  Date/Time: 8/1/2019 11:17 AM  Performed by: Castillo Heller MD  Authorized by: Castillo Heller MD     Indications:  Pain  Needle Size:  22 G  Guidance: landmark guided    Approach:  Medial  Location:  Hip  Laterality:  Bilateral      Site:  Bilateral greater trochanteric bursa  Medications (Right):  80 mg methylPREDNISolone 80 MG/ML; 1 mL lidocaine 1 %  Medications (Left):  80 mg methylPREDNISolone 80 MG/ML; 1 mL lidocaine 1 %  Consent Given by:  Patient  Prep: patient was prepped and draped in usual sterile fashion            Again, thank you for allowing me to participate in the care of your patient.        Sincerely,        Castillo Heller MD

## 2019-08-05 ENCOUNTER — TELEPHONE (OUTPATIENT)
Dept: FAMILY MEDICINE | Facility: CLINIC | Age: 79
End: 2019-08-05

## 2019-08-05 NOTE — TELEPHONE ENCOUNTER
Patient was notified of the lab results and the recommendations below.  She states that she has increased her water consumption to 3 x 24 oz bottles of water daily.  She has lost 4 pounds since last week and is exercising.  I praised her for her efforts and asked she keep up the good work.  Patient verbalizes good understanding, agrees with plan and states she needs no further support. Leticia Nvaa R.N.

## 2019-08-05 NOTE — TELEPHONE ENCOUNTER
Please let Swati know that GFR is a test that measures kidney function.  Based on her numbers her kidney function is stable when compared to previous labs.  It did improve by 2 points from her previous lab.  Her GFR indicates she has mild kidney disease- this is commonly seen as her kidneys are 80 years old.  To keep her kidneys healthy, I recommend good blood pressure control, which she has and avoiding NSAIDs, which she does due to allergy. Staying well hydrated is important- no need to drink water excessively, just be mindful of staying hydrated.  We will continue to monitor her lab work moving forward.      KARLEE Sinha

## 2019-08-05 NOTE — TELEPHONE ENCOUNTER
Patient states that she is wanting a better explanation of what the GFR result means in relationship to her own kidney status.  She says she's been drinking 9 eight ounce glasses of water daily for past 2 weeks because she hadn't been drinking much water at all before.  Ok to wait for pcp for Wednesday.  Irlanda Christian RN

## 2019-08-06 ENCOUNTER — TELEPHONE (OUTPATIENT)
Dept: FAMILY MEDICINE | Facility: CLINIC | Age: 79
End: 2019-08-06

## 2019-08-06 NOTE — TELEPHONE ENCOUNTER
Patient is calling back to speak with provider about the diet plan she has started, patient is requesting a letter stating patient shouldn't be on the diet plan due to medications/condition.  Patient has signed a contract for [facility]       Patient would be able to pick this letter up at .       Please call to advice  Thank you

## 2019-08-07 NOTE — TELEPHONE ENCOUNTER
Pt stopped in to check on the progress of the letter. She would like to speak to someone but does not want to make the appointment. Please call to discuss    Thank you

## 2019-08-07 NOTE — TELEPHONE ENCOUNTER
I recommend she make an appt to discuss or send a my-chart message with details of her current diet plan.  I cannot say for certain that the diet plan is unhealthy for her to follow if I do not know what all she is consuming.  I also cannot guarantee that a letter would get her out of the [facility] contract.

## 2019-08-08 NOTE — TELEPHONE ENCOUNTER
Checking on status of letter stating patient should not be on Medifast due to other meds she is on. Ok to leave a detailed message. Please call to notify. Ok to leave a detailed message.

## 2019-08-09 ENCOUNTER — OFFICE VISIT (OUTPATIENT)
Dept: FAMILY MEDICINE | Facility: CLINIC | Age: 79
End: 2019-08-09
Payer: MEDICARE

## 2019-08-09 VITALS
SYSTOLIC BLOOD PRESSURE: 127 MMHG | WEIGHT: 147.2 LBS | BODY MASS INDEX: 28.51 KG/M2 | TEMPERATURE: 99 F | HEART RATE: 92 BPM | DIASTOLIC BLOOD PRESSURE: 75 MMHG

## 2019-08-09 DIAGNOSIS — Z71.9 ENCOUNTER FOR CONSULTATION: Primary | ICD-10-CM

## 2019-08-09 PROCEDURE — 99212 OFFICE O/P EST SF 10 MIN: CPT | Performed by: NURSE PRACTITIONER

## 2019-08-09 ASSESSMENT — ENCOUNTER SYMPTOMS
SHORTNESS OF BREATH: 0
FEVER: 0
CHILLS: 0
ABDOMINAL PAIN: 0
COUGH: 0
DIARRHEA: 1

## 2019-08-09 NOTE — LETTER
August 9, 2019        To Whom It May Concern:    It is my understanding that Mrs. Swati Marinelli has recently started the [facility] Program.  Based on her chronic medical conditions and recent side effects it is advised the she stop this program.      If you have any questions, please contact the clinic at [phone].       Sincerely,        Leeanne Cunha NP

## 2019-08-09 NOTE — PROGRESS NOTES
Subjective     Swati Marinelli is a 79 year old female who presents to clinic today for the following health issues:    HPI   Patient is here to discuss [employer] letter.    Swati started the [employer] Program/Diet 2 weeks ago.  She reports this program consists of dietary supplements, meals, and shakes to promote weight loss.  She has been experiencing diarrhea since starting the program.  She reports the past few days she stopped the program recommendations and her bowel habits have returned to normal.  Her  had a very serious hypoglycemic episode a few days ago where she found him unconscious and this has been very traumatic for her.  She is wanting to stop the program d/t the side effects they are both experiencing.        Patient Active Problem List   Diagnosis     Advanced directives, counseling/discussion     Hypertension goal BP (blood pressure) < 150/90     Neurocardiogenic syncope     Mixed hyperlipidemia     MANNY (obstructive sleep apnea)- 'moderate' (AHI 19)     Nonrheumatic aortic valve insufficiency     Anxiety     Gastroesophageal reflux disease without esophagitis     Sicca syndrome (H)     ANH positive     Primary osteoarthritis involving multiple joints     Malignant neoplasm of skin of face     CKD (chronic kidney disease) stage 3, GFR 30-59 ml/min (H)     Past Surgical History:   Procedure Laterality Date     APPENDECTOMY OPEN CHILD  1951     C TOTAL KNEE ARTHROPLASTY Left 2011     C TOTAL KNEE ARTHROPLASTY Left 2013    revision     CARPAL TUNNEL RELEASE RT/LT Right 1995 1990s     CATARACT IOL, RT/LT  2009    right 2/4/09, left 2/21/09     CHOLECYSTECTOMY  06/2012     COLONOSCOPY  2012    normal     MOHS MICROGRAPHIC PROCEDURE  2011    back - 2011, lip and back 5/2012     ROTATOR CUFF REPAIR RT/LT Right 2009     ROTATOR CUFF REPAIR RT/LT Left 04/20/2016     TONSILLECTOMY  1945       Social History     Tobacco Use     Smoking status: Never Smoker     Smokeless tobacco: Never Used   Substance Use  Topics     Alcohol use: No     Family History   Problem Relation Age of Onset     Tuberculosis Mother      Myocardial Infarction Father      Coronary Artery Disease Father      Myocardial Infarction Brother      Coronary Artery Disease Brother      Heart Surgery Daughter      Diabetes No family hx of      Colon Cancer No family hx of      Breast Cancer No family hx of          Current Outpatient Medications   Medication Sig Dispense Refill     citalopram (CELEXA) 20 MG tablet TAKE 1 TABLET (20 MG) BY MOUTH DAILY (Patient taking differently: 10 mg TAKE 1 TABLET (20 MG) BY MOUTH DAILY) 90 tablet 1     clopidogrel (PLAVIX) 75 MG tablet TAKE 1 TABLET (75 MG) BY MOUTH DAILY 90 tablet 3     lisinopril-hydrochlorothiazide (PRINZIDE/ZESTORETIC) 20-25 MG tablet TAKE 1 TABLET EVERY DAY 90 tablet 3     loratadine (CLARITIN) 10 MG tablet Take 1 tablet (10 mg) by mouth daily 90 tablet 3     meclizine (ANTIVERT) 25 MG tablet Take 1 tablet (25 mg) by mouth every 6 hours as needed for dizziness 30 tablet 1     order for DME autoCPAP 7-13 cmH20 1 Device 0     pantoprazole (PROTONIX) 40 MG EC tablet Take 1 tablet (40 mg) by mouth daily 90 tablet 1     potassium chloride ER (KLOR-CON) 20 MEQ CR tablet TAKE 1 TABLET EVERY DAY 90 tablet 1     rosuvastatin (CRESTOR) 5 MG tablet TAKE 1 TABLET EVERY DAY 90 tablet 1     verapamil ER (CALAN-SR) 180 MG CR tablet Take 1 tablet (180 mg) by mouth 2 times daily 180 tablet 3     Allergies   Allergen Reactions     Aleve [Naproxen]      Asa [Aspirin]      Codeine      Doxycycline      Elavil [Amitriptyline]      Etodolac      Gabapentin      Lyrica [Pregabalin]      Mushroom      Oxycodone      Oxycodone-Acetaminophen      Vicodin [Hydrocodone-Acetaminophen]        Reviewed and updated as needed this visit by Provider         Review of Systems   Constitutional: Negative for chills and fever.   Respiratory: Negative for cough and shortness of breath.    Cardiovascular: Negative for chest pain.  "  Gastrointestinal: Positive for diarrhea. Negative for abdominal pain.            Objective    /75   Pulse 92   Temp 99  F (37.2  C) (Oral)   Wt 66.8 kg (147 lb 3.2 oz)   BMI 28.51 kg/m    Body mass index is 28.51 kg/m .  Physical Exam   Constitutional: Vital signs are normal. She appears well-developed and well-nourished.   Cardiovascular: Normal rate, regular rhythm and normal heart sounds.   Pulmonary/Chest: Effort normal and breath sounds normal.   Abdominal: Soft. There is no tenderness.   Skin: Skin is warm and dry.   Vitals reviewed.             Assessment & Plan     1. Encounter for consultation  - I have written/given Mrs. Marinelli a letter stating I recommend [facility] be [facility].  Advised to notify office if she needs any further documentation.        BMI:   Estimated body mass index is 28.51 kg/m  as calculated from the following:    Height as of 7/24/19: 1.53 m (5' 0.25\").    Weight as of this encounter: 66.8 kg (147 lb 3.2 oz).   Weight management plan: Discussed healthy diet and exercise guidelines          Return for Keep same future f/u on 10/30/19.    Leeanne Cunha NP  [facility]    "

## 2019-09-03 ENCOUNTER — DOCUMENTATION ONLY (OUTPATIENT)
Dept: SLEEP MEDICINE | Facility: CLINIC | Age: 79
End: 2019-09-03
Payer: MEDICARE

## 2019-09-03 DIAGNOSIS — G47.33 OSA (OBSTRUCTIVE SLEEP APNEA): ICD-10-CM

## 2019-09-03 NOTE — PROGRESS NOTES
Swati Marinelli came to [place] on September 3, 2019 for a troubleshoot of her mask/tubing.  The tubing had gotten stretched out at the end and has been leaking.  Patient had her prior tubing, so she will use that until she is eligible for new tubing next month (10/26/19).  DME to ship new mask, tubing and filters to patient.

## 2019-09-12 ENCOUNTER — OFFICE VISIT (OUTPATIENT)
Dept: FAMILY MEDICINE | Facility: CLINIC | Age: 79
End: 2019-09-12
Payer: MEDICARE

## 2019-09-12 VITALS
TEMPERATURE: 98.1 F | DIASTOLIC BLOOD PRESSURE: 73 MMHG | HEART RATE: 75 BPM | WEIGHT: 149.8 LBS | BODY MASS INDEX: 29.01 KG/M2 | SYSTOLIC BLOOD PRESSURE: 124 MMHG
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
## 2019-09-12 DIAGNOSIS — Z23 NEED FOR PROPHYLACTIC VACCINATION AND INOCULATION AGAINST INFLUENZA: ICD-10-CM

## 2019-09-12 DIAGNOSIS — S09.90XD CLOSED HEAD INJURY, SUBSEQUENT ENCOUNTER: ICD-10-CM

## 2019-09-12 DIAGNOSIS — S80.02XD CONTUSION OF LEFT KNEE, SUBSEQUENT ENCOUNTER: ICD-10-CM

## 2019-09-12 DIAGNOSIS — S60.222D CONTUSION OF LEFT HAND, SUBSEQUENT ENCOUNTER: ICD-10-CM

## 2019-09-12 PROCEDURE — G0008 ADMIN INFLUENZA VIRUS VAC: HCPCS | Performed by: NURSE PRACTITIONER

## 2019-09-12 PROCEDURE — 90662 IIV NO PRSV INCREASED AG IM: CPT | Performed by: NURSE PRACTITIONER

## 2019-09-12 PROCEDURE — 99213 OFFICE O/P EST LOW 20 MIN: CPT | Mod: 25 | Performed by: NURSE PRACTITIONER

## 2019-09-12 ASSESSMENT — ENCOUNTER SYMPTOMS
NERVOUS/ANXIOUS: 0
FATIGUE: 0
WOUND: 0
DIZZINESS: 1
NAUSEA: 0
LIGHT-HEADEDNESS: 0
FEVER: 0
HEADACHES: 1
PARESTHESIAS: 0
SLEEP DISTURBANCE: 0
VOMITING: 0
ARTHRALGIAS: 1
ABDOMINAL PAIN: 0
CHILLS: 0
MYALGIAS: 1
PALPITATIONS: 0
COUGH: 0
APPETITE CHANGE: 0
WEAKNESS: 0
NUMBNESS: 0
SHORTNESS OF BREATH: 0
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
## 2019-09-26 NOTE — PROGRESS NOTES
Subjective     Swati Marinelli is a 79 year old female who presents to clinic today for the following health issues:    HPI   Recheck shoulder injury. Last seen 9/12/2019    Swati presents with c/o ongoing bilateral shoulder and arm pain (right greater than left) since fall on 9/5/19.  She was evaluated at the ER after the fall and had negative xray of left wrist and knee.  Reports at that time she was not experiencing the shoulder pain- it presented the following few days after fall.  Denies numbness or tingling in arms.  Denies weakness of .  She has not taken anything for pain- applying heat does help and seeing Chiropractor.  Reports h/o bilateral rotator cuff surgery years ago.          Patient Active Problem List   Diagnosis     Advanced directives, counseling/discussion     Hypertension goal BP (blood pressure) < 150/90     Neurocardiogenic syncope     Mixed hyperlipidemia     MANNY (obstructive sleep apnea)- 'moderate' (AHI 19)     Nonrheumatic aortic valve insufficiency     Anxiety     Gastroesophageal reflux disease without esophagitis     Sicca syndrome (H)     ANH positive     Primary osteoarthritis involving multiple joints     Malignant neoplasm of skin of face     CKD (chronic kidney disease) stage 3, GFR 30-59 ml/min (H)     Past Surgical History:   Procedure Laterality Date     APPENDECTOMY OPEN CHILD  1951     C TOTAL KNEE ARTHROPLASTY Left 2011     C TOTAL KNEE ARTHROPLASTY Left 2013    revision     CARPAL TUNNEL RELEASE RT/LT Right 1995 1990s     CATARACT IOL, RT/LT  2009    right 2/4/09, left 2/21/09     CHOLECYSTECTOMY  06/2012     COLONOSCOPY  2012    normal     MOHS MICROGRAPHIC PROCEDURE  2011    back - 2011, lip and back 5/2012     ROTATOR CUFF REPAIR RT/LT Right 2009     ROTATOR CUFF REPAIR RT/LT Left 04/20/2016     TONSILLECTOMY  1945       Social History     Tobacco Use     Smoking status: Never Smoker     Smokeless tobacco: Never Used   Substance Use Topics     Alcohol use: No     Family  History   Problem Relation Age of Onset     Tuberculosis Mother      Myocardial Infarction Father      Coronary Artery Disease Father      Myocardial Infarction Brother      Coronary Artery Disease Brother      Heart Surgery Daughter      Diabetes No family hx of      Colon Cancer No family hx of      Breast Cancer No family hx of          Current Outpatient Medications   Medication Sig Dispense Refill     citalopram (CELEXA) 20 MG tablet TAKE 1 TABLET (20 MG) BY MOUTH DAILY (Patient taking differently: 20 mg TAKE 1 TABLET (20 MG) BY MOUTH DAILY) 90 tablet 1     clopidogrel (PLAVIX) 75 MG tablet TAKE 1 TABLET (75 MG) BY MOUTH DAILY 90 tablet 3     lisinopril-hydrochlorothiazide (PRINZIDE/ZESTORETIC) 20-25 MG tablet TAKE 1 TABLET EVERY DAY (Patient taking differently: Take 0.5 tablets by mouth daily 9/12/2019 patient states she takes 1/2 tablet daily.TAKE 1 TABLET EVERY DAY) 90 tablet 3     loratadine (CLARITIN) 10 MG tablet Take 1 tablet (10 mg) by mouth daily 90 tablet 3     meclizine (ANTIVERT) 25 MG tablet Take 1 tablet (25 mg) by mouth every 6 hours as needed for dizziness 30 tablet 1     order for DME autoCPAP 7-13 cmH20 1 Device 0     pantoprazole (PROTONIX) 40 MG EC tablet Take 1 tablet (40 mg) by mouth daily 90 tablet 1     potassium chloride ER (KLOR-CON) 20 MEQ CR tablet TAKE 1 TABLET EVERY DAY 90 tablet 1     rosuvastatin (CRESTOR) 5 MG tablet TAKE 1 TABLET EVERY DAY 90 tablet 1     verapamil ER (CALAN-SR) 180 MG CR tablet Take 1 tablet (180 mg) by mouth 2 times daily 180 tablet 3     Allergies   Allergen Reactions     Aleve [Naproxen]      Asa [Aspirin]      Codeine      Doxycycline      Elavil [Amitriptyline]      Etodolac      Gabapentin      Lyrica [Pregabalin]      Mushroom      Oxycodone      Oxycodone-Acetaminophen      Vicodin [Hydrocodone-Acetaminophen]        Reviewed and updated as needed this visit by Provider         Review of Systems   Constitutional: Negative for chills and fever.    Respiratory: Negative for shortness of breath.    Cardiovascular: Negative for chest pain.   Musculoskeletal: Positive for arthralgias.   Neurological: Negative for weakness, numbness and paresthesias.            Objective    /66   Pulse 72   Temp 99.3  F (37.4  C) (Oral)   Wt 68.2 kg (150 lb 6.4 oz)   BMI 29.13 kg/m    Body mass index is 29.13 kg/m .  Physical Exam  Vitals signs reviewed.   Constitutional:       Appearance: She is well-developed.   HENT:      Head: Normocephalic.   Cardiovascular:      Rate and Rhythm: Normal rate and regular rhythm.   Pulmonary:      Effort: Pulmonary effort is normal.      Breath sounds: Normal breath sounds.   Musculoskeletal:         General: Tenderness present.      Right shoulder: She exhibits decreased range of motion and tenderness.      Left shoulder: She exhibits decreased range of motion and tenderness.      Comments: Limited ROM of right shoulder in all fields.   equal bilaterally.    Neurological:      Mental Status: She is alert.                Assessment & Plan     1. Acute pain of right shoulder  - XR Shoulder Right 2 Views  - PHYSICAL THERAPY REFERRAL; Future    2. Acute pain of left shoulder  - PHYSICAL THERAPY REFERRAL; Future    3. History of recent fall    May continue heating pad if this provides relief.         Return in about 4 weeks (around 10/25/2019) for worsening symptoms or failure to improve.    Leeanne Cunha NP  [facility]

## 2019-09-27 ENCOUNTER — OFFICE VISIT (OUTPATIENT)
Dept: FAMILY MEDICINE | Facility: CLINIC | Age: 79
End: 2019-09-27
Payer: MEDICARE

## 2019-09-27 ENCOUNTER — ANCILLARY PROCEDURE (OUTPATIENT)
Dept: GENERAL RADIOLOGY | Facility: CLINIC | Age: 79
End: 2019-09-27
Attending: NURSE PRACTITIONER
Payer: MEDICARE

## 2019-09-27 VITALS
HEART RATE: 72 BPM | SYSTOLIC BLOOD PRESSURE: 122 MMHG | DIASTOLIC BLOOD PRESSURE: 66 MMHG | WEIGHT: 150.4 LBS | TEMPERATURE: 99.3 F | BODY MASS INDEX: 29.13 KG/M2

## 2019-09-27 DIAGNOSIS — M25.512 ACUTE PAIN OF LEFT SHOULDER: ICD-10-CM

## 2019-09-27 DIAGNOSIS — Z91.81 HISTORY OF RECENT FALL: ICD-10-CM

## 2019-09-27 DIAGNOSIS — M25.511 ACUTE PAIN OF RIGHT SHOULDER: Primary | ICD-10-CM

## 2019-09-27 PROCEDURE — 73030 X-RAY EXAM OF SHOULDER: CPT | Mod: RT

## 2019-09-27 PROCEDURE — 99213 OFFICE O/P EST LOW 20 MIN: CPT | Performed by: NURSE PRACTITIONER

## 2019-09-27 ASSESSMENT — ENCOUNTER SYMPTOMS
PARESTHESIAS: 0
SHORTNESS OF BREATH: 0
WEAKNESS: 0
FEVER: 0
NUMBNESS: 0
CHILLS: 0
ARTHRALGIAS: 1

## 2019-09-30 ENCOUNTER — THERAPY VISIT (OUTPATIENT)
Dept: PHYSICAL THERAPY | Facility: CLINIC | Age: 79
End: 2019-09-30
Payer: MEDICARE

## 2019-09-30 DIAGNOSIS — M25.511 ACUTE PAIN OF BOTH SHOULDERS: ICD-10-CM

## 2019-09-30 DIAGNOSIS — M25.512 ACUTE PAIN OF BOTH SHOULDERS: ICD-10-CM

## 2019-09-30 PROCEDURE — 97140 MANUAL THERAPY 1/> REGIONS: CPT | Mod: GP | Performed by: PHYSICAL THERAPIST

## 2019-09-30 PROCEDURE — 97162 PT EVAL MOD COMPLEX 30 MIN: CPT | Mod: GP | Performed by: PHYSICAL THERAPIST

## 2019-09-30 PROCEDURE — 97110 THERAPEUTIC EXERCISES: CPT | Mod: GP | Performed by: PHYSICAL THERAPIST

## 2019-09-30 NOTE — LETTER
DEPARTMENT OF HEALTH AND HUMAN SERVICES  CENTERS FOR MEDICARE & MEDICAID SERVICES    PLAN/UPDATED PLAN OF PROGRESS FOR OUTPATIENT REHABILITATION    PATIENTS NAME:  Swati Marinelli   : 1940  PROVIDER NUMBER:    [id number]  HICN: [id number]  PROVIDER NAME: BRIAN CHOUDHURY  MEDICAL RECORD NUMBER: [id number]   START OF CARE DATE:  SOC Date: 19   TYPE:  PT  PRIMARY/TREATMENT DIAGNOSIS: (Pertinent Medical Diagnosis)  Acute pain of both shoulders  VISITS FROM START OF CARE:  Rxs Used: 1     [facility] Initial Evaluation  Subjective:  The history is provided by the patient. No  was used.   Swati Marinelli being seen for bilateral shoulder pain.   Problem began 2019. Where condition occurred: in the community.Problem occurred: a slip/fall - landed on right side with arm above head  and reported as 10/10 (7/10 right, 5/10 left at rest) on pain scale. General health as reported by patient is good. Pertinent medical history includes:  Cancer, high blood pressure and overweight. Other medical history details: skin cancer.  Surgeries include:  Orthopedic surgery. Other surgery history details: bilateral rotator cuff repairs about 4 years ago - was doing well before the fall; left knee .  Current medications: see chart.   Primary job tasks include:  Repetitive tasks and pushing/pulling.  Pain is described as sharp and is constant.  Since onset symptoms are unchanged (slight improvement in left shoulder mobility). Special tests:  X-ray (R shoulder - no fracture).  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   Barriers include:  None as reported by patient.  Red flags:  None as reported by patient.  Type of problem:  Bilateral shoulders (right more than left)   Condition occurred with:  A fall. This is a new condition    Patient reports pain:  Other (right upper trap, scapula, under arm; left superior shoulder).  " Associated symptoms:  Loss of motion/stiffness and loss of strength. Symptoms are exacerbated by lying on extremity, using arm at shoulder level, using arm behind back, using arm overhead and other (reaching into cupboards, laying on arm, \"any movement\") and relieved by heat, rest and other (tylenol).  Patient's goal is to feel better at rest, be able to sleep in bed, work in the kitchen independently and get back to exercise program 3 X week.                   Objective:  Standing Alignment:    Shoulder/UE:  Rounded shoulders (sits with right shoulder elevated, right arm in front of body)       Shoulder Evaluation:  ROM:  AROM:    Flexion:  Left:  145    Right:  65 with UT substitution and UT pain  Abduction:  Left: 145   Right:  45 with UT substitution and UT pain    PATIENTS NAME:  Yves    : 1940    PROM:    Flexion:  Left:  160 with ERP left shoulder    Right: 75 with pain in UT and scapula    Abduction:  Left:  160 with pain on descent    Right:  55 with pain  Internal Rotation:  Left:  WNL    Right:  WNL  External Rotation:  Left:  45    Right:  45  Strength:  : not formally tested d/t pain, patient able to move arm actively in all directions but formal grading confounded by pain.  Special Tests:    Left shoulder negative for the following special tests:  Rotator cuff tear  Right shoulder positive for the following special tests:Rotator cuff tear (drop arm)    Assessment/Plan:    Patient is a 79 year old female with bilateral shoulder complaints.    Patient has the following significant findings with corresponding treatment plan.                Diagnosis 1: bilateral shoulder pain after a fall, suspicion of rotator cuff tear on the right    Pain -  hot/cold therapy, manual therapy, education and home program  Decreased ROM/flexibility - manual therapy, therapeutic exercise and home program  Decreased joint mobility - manual therapy, therapeutic exercise and home program  Decreased strength - " therapeutic exercise, therapeutic activities and home program  Impaired muscle performance - neuro re-education and home program  Impaired posture - neuro re-education and home program    Therapy Evaluation Codes:   1) History comprised of:   Personal factors that impact the plan of care:      Age, Overall behavior pattern, Past/current experiences and Time since onset of symptoms.    Comorbidity factors that impact the plan of care are:      Cancer and High blood pressure.     Medications impacting care: Pain.  2) Examination of Body Systems comprised of:   Body structures and functions that impact the plan of care:      Shoulder.   Activity limitations that impact the plan of care are:      Bathing, Cooking, Dressing, Lifting, Sleeping and Laying down.  3) Clinical presentation characteristics are:   Evolving/Changing.  4) Decision-Making    Moderate complexity using standardized patient assessment instrument and/or measureable assessment of functional outcome.  Cumulative Therapy Evaluation is: Moderate complexity.  Previous and current functional limitations:  (See Goal Flow Sheet for this information)    Short term and Long term goals: (See Goal Flow Sheet for this information)   Communication ability:  Patient appears to be able to clearly communicate and understand verbal and written communication and follow directions correctly.  Treatment Explanation - The following has been discussed with the patient:   RX ordered/plan of care  Anticipated outcomes  Possible risks and side effects  This patient would benefit from PT intervention to resume normal activities.   Rehab potential is good.  Frequency:  1 X week, once daily  Duration:  for 8 weeks - plan to refer back to referring NP or on to Orthopedist if no progress in the first 3 visits, given suspicion for rotator cuff tear  Discharge Plan:  Achieve all LTG.  Independent in home treatment program.  Reach maximal therapeutic benefit.  Please refer to the  "daily flowsheet for treatment today, total treatment time and time spent performing 1:1 timed codes.         Caregiver Signature/Credentials _____________________________ Date ________      Treating Provider: Conrad Rojas DPT   I have reviewed and certified the need for these services and plan of treatment while under my care.        PHYSICIAN'S SIGNATURE:   _________________________________________  Date___________   Leeanne Cunha NP  Certification period:  Beginning of Cert date period: 09/30/19 to  End of Cert period date: 11/28/19     Functional Level Progress Report: Please see attached \"Goal Flow sheet for Functional level.\"    ____X____ Continue Services or       ________ DC Services                Service dates: From  SOC Date: 09/30/19 date to present                         "

## 2019-09-30 NOTE — PROGRESS NOTES
"[facility] Initial Evaluation  Subjective:  The history is provided by the patient. No  was used.   Swati Marinelli being seen for bilateral shoulder pain.   Problem began 9/5/2019. Where condition occurred: in the community.Problem occurred: a slip/fall - landed on right side with arm above head  and reported as 10/10 (7/10 right, 5/10 left at rest) on pain scale. General health as reported by patient is good. Pertinent medical history includes:  Cancer, high blood pressure and overweight. Other medical history details: skin cancer.    Surgeries include:  Orthopedic surgery. Other surgery history details: bilateral rotator cuff repairs about 4 years ago - was doing well before the fall; left knee .  Current medications: see chart.   Primary job tasks include:  Repetitive tasks and pushing/pulling.  Pain is described as sharp and is constant.  Since onset symptoms are unchanged (slight improvement in left shoulder mobility). Special tests:  X-ray (R shoulder - no fracture).     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   Barriers include:  None as reported by patient.  Red flags:  None as reported by patient.  Type of problem:  Bilateral shoulders (right more than left)   Condition occurred with:  A fall. This is a new condition    Patient reports pain:  Other (right upper trap, scapula, under arm; left superior shoulder).  Associated symptoms:  Loss of motion/stiffness and loss of strength. Symptoms are exacerbated by lying on extremity, using arm at shoulder level, using arm behind back, using arm overhead and other (reaching into cupboards, laying on arm, \"any movement\") and relieved by heat, rest and other (tylenol).    Patient's goal is to feel better at rest, be able to sleep in bed, work in the kitchen independently and get back to exercise program 3 X week.                     Objective:  Standing " Alignment:      Shoulder/UE:  Rounded shoulders (sits with right shoulder elevated, right arm in front of body)                                       Shoulder Evaluation:  ROM:  AROM:    Flexion:  Left:  145    Right:  65 with UT substitution and UT pain    Abduction:  Left: 145   Right:  45 with UT substitution and UT pain                      PROM:    Flexion:  Left:  160 with ERP left shoulder    Right: 75 with pain in UT and scapula      Abduction:  Left:  160 with pain on descent    Right:  55 with pain    Internal Rotation:  Left:  WNL    Right:  WNL  External Rotation:  Left:  45    Right:  45                    Strength:  : not formally tested d/t pain, patient able to move arm actively in all directions but formal grading confounded by pain.                        Special Tests:      Left shoulder negative for the following special tests:  Rotator cuff tear  Right shoulder positive for the following special tests:Rotator cuff tear (drop arm)                                       General     ROS    Assessment/Plan:    Patient is a 79 year old female with bilateral shoulder complaints.    Patient has the following significant findings with corresponding treatment plan.                Diagnosis 1: bilateral shoulder pain after a fall, suspicion of rotator cuff tear on the right    Pain -  hot/cold therapy, manual therapy, education and home program  Decreased ROM/flexibility - manual therapy, therapeutic exercise and home program  Decreased joint mobility - manual therapy, therapeutic exercise and home program  Decreased strength - therapeutic exercise, therapeutic activities and home program  Impaired muscle performance - neuro re-education and home program  Impaired posture - neuro re-education and home program    Therapy Evaluation Codes:   1) History comprised of:   Personal factors that impact the plan of care:      Age, Overall behavior pattern, Past/current experiences and Time since onset of symptoms.     Comorbidity factors that impact the plan of care are:      Cancer and High blood pressure.     Medications impacting care: Pain.  2) Examination of Body Systems comprised of:   Body structures and functions that impact the plan of care:      Shoulder.   Activity limitations that impact the plan of care are:      Bathing, Cooking, Dressing, Lifting, Sleeping and Laying down.  3) Clinical presentation characteristics are:   Evolving/Changing.  4) Decision-Making    Moderate complexity using standardized patient assessment instrument and/or measureable assessment of functional outcome.  Cumulative Therapy Evaluation is: Moderate complexity.    Previous and current functional limitations:  (See Goal Flow Sheet for this information)    Short term and Long term goals: (See Goal Flow Sheet for this information)     Communication ability:  Patient appears to be able to clearly communicate and understand verbal and written communication and follow directions correctly.  Treatment Explanation - The following has been discussed with the patient:   RX ordered/plan of care  Anticipated outcomes  Possible risks and side effects  This patient would benefit from PT intervention to resume normal activities.   Rehab potential is good.    Frequency:  1 X week, once daily  Duration:  for 8 weeks - plan to refer back to referring NP or on to Orthopedist if no progress in the first 3 visits, given suspicion for rotator cuff tear  Discharge Plan:  Achieve all LTG.  Independent in home treatment program.  Reach maximal therapeutic benefit.    Please refer to the daily flowsheet for treatment today, total treatment time and time spent performing 1:1 timed codes.

## 2019-10-09 ENCOUNTER — THERAPY VISIT (OUTPATIENT)
Dept: PHYSICAL THERAPY | Facility: CLINIC | Age: 79
End: 2019-10-09
Payer: MEDICARE

## 2019-10-09 DIAGNOSIS — M25.511 ACUTE PAIN OF BOTH SHOULDERS: ICD-10-CM

## 2019-10-09 DIAGNOSIS — M25.512 ACUTE PAIN OF BOTH SHOULDERS: ICD-10-CM

## 2019-10-09 PROCEDURE — 97140 MANUAL THERAPY 1/> REGIONS: CPT | Mod: GP | Performed by: PHYSICAL THERAPIST

## 2019-10-09 PROCEDURE — 97110 THERAPEUTIC EXERCISES: CPT | Mod: GP | Performed by: PHYSICAL THERAPIST

## 2019-10-17 ENCOUNTER — THERAPY VISIT (OUTPATIENT)
Dept: PHYSICAL THERAPY | Facility: CLINIC | Age: 79
End: 2019-10-17
Payer: MEDICARE

## 2019-10-17 DIAGNOSIS — M25.512 ACUTE PAIN OF BOTH SHOULDERS: ICD-10-CM

## 2019-10-17 DIAGNOSIS — M25.511 ACUTE PAIN OF BOTH SHOULDERS: ICD-10-CM

## 2019-10-17 PROCEDURE — 97110 THERAPEUTIC EXERCISES: CPT | Mod: GP | Performed by: PHYSICAL THERAPIST

## 2019-10-17 PROCEDURE — 97140 MANUAL THERAPY 1/> REGIONS: CPT | Mod: GP | Performed by: PHYSICAL THERAPIST

## 2019-10-17 NOTE — PROGRESS NOTES
PROGRESS  REPORT    Progress reporting period is from 9/30/19 to 10/17/19.       SUBJECTIVE  Subjective changes noted by patient: Patient reports that her pain in both shoulders is about the same since onset of therapy. She continues to have difficulty using her pulleys at home due to pain, despite no pain while doing it in clinic.    Current pain level is at rest 4/10 R; 2/10 L; with mvmt up to 7/10 R.     Previous pain level was 10/10.   Changes in function:  None  Adverse reaction to treatment or activity: None    OBJECTIVE  Changes noted in objective findings: AROM R shoulder flexion 110, abd 80 - both limited by pain. AROM L shoulder flexion and abd 160. R shoulder RC testing: drop arm positive, lift off positive, belly press positive.     ASSESSMENT/PLAN  Updated problem list and treatment plan: bilateral shoulder pain after a fall, suspicion of rotator cuff tear on the right    Pain -  hot/cold therapy, manual therapy, education and home program  Decreased ROM/flexibility - manual therapy, therapeutic exercise and home program  Decreased joint mobility - manual therapy, therapeutic exercise and home program  Decreased strength - therapeutic exercise, therapeutic activities and home program  Impaired muscle performance - neuro re-education and home program  Impaired posture - neuro re-education and home program   STG/LTGs have been met or progress has been made towards goals:  Yes, slight improvement in AROM R shoulder - this has not translated to meaningful change for patient  Assessment of Progress: The patient's condition is unchanged.  Self Management Plans:  Patient has been instructed in a home treatment program.  I have re-evaluated this patient and find that the nature, scope, duration and intensity of the therapy is appropriate for the medical condition of the patient.  Swati continues to require the following intervention to meet STG and LTG's:  PT and see below    Recommendations:  This patient would  benefit from further evaluation.    Please refer to the daily flowsheet for treatment today, total treatment time and time spent performing 1:1 timed codes.

## 2019-10-18 ENCOUNTER — TELEPHONE (OUTPATIENT)
Dept: FAMILY MEDICINE | Facility: CLINIC | Age: 79
End: 2019-10-18

## 2019-10-18 ENCOUNTER — MEDICAL CORRESPONDENCE (OUTPATIENT)
Dept: FAMILY MEDICINE | Facility: CLINIC | Age: 79
End: 2019-10-18

## 2019-10-18 DIAGNOSIS — M25.511 ACUTE PAIN OF RIGHT SHOULDER: Primary | ICD-10-CM

## 2019-10-18 DIAGNOSIS — M25.512 ACUTE PAIN OF LEFT SHOULDER: ICD-10-CM

## 2019-10-18 NOTE — TELEPHONE ENCOUNTER
Please call Swati and let her know I have received an update from the Physical Therapist.  It appears based on therapy and lack of progress there is concern for possible rotator cuff tear.  I would like Swati to see Orthopedics and I have placed a referral.  They will be calling her to schedule.  Please let me know if she has any questions or concerns.      Leeanne Cunha, NP

## 2019-10-23 NOTE — PROGRESS NOTES
SUBJECTIVE:  Swati Marinelli is a 79 year old female who is seen in consultation at the request of Leeanne Cunha NP for bilateral shoulder pain. She fell 9/5/19 and started to have pain in the shoulders the following day. The right shoulder is worse than the left. She can't raise her arm. The pain is all the way to the shoulder blade. During her fall, she fell with her left arm straight out in front of her and her right arm was abducted. She landed on her right side where she reports the pain is worse. The pain is lateral and posteriorly to the shoulder blade. She can't move her right arm above her shoulder. No issues with her shoulders until she fell. She is experiencing less pain on her left shoulder and can move it better. She experiences a lot of neck pain and reports no numbness.    CT scan of her cervical spine: showed multi level degenerative disease.   Treatment up to this point: Physical therapy  Ortho PMH: Arthroscopic rotator cuff repairs right /left  in 2015/2016.   Left achilles tendon repair and knee revision surgery in 2012.     Review of Systems:  Constitutional:  NEGATIVE for fever, chills, change in weight  Integumentary/Skin:  NEGATIVE for worrisome rashes, moles or lesions  Eyes:  NEGATIVE for vision changes or irritation  ENT/Mouth:  NEGATIVE for ear, mouth and throat problems  Resp:  NEGATIVE for significant cough or SOB  Breast:  NEGATIVE for masses, tenderness or discharge  CV:  NEGATIVE for chest pain, palpitations or peripheral edema  GI:  NEGATIVE for nausea, abdominal pain, heartburn, or change in bowel habits  :  Negative   Musculoskeletal:  See HPI above  Neuro:  NEGATIVE for weakness, dizziness or paresthesias  Endocrine:  NEGATIVE for temperature intolerance, skin/hair changes  Heme/allergy/immune:  NEGATIVE for bleeding problems  Psychiatric:  NEGATIVE for changes in mood or affect    Past Medical History:   Past Medical History:   Diagnosis Date     GERD (gastroesophageal reflux  disease)      Hypertension goal BP (blood pressure) < 150/90 11/14/2017     Mixed hyperlipidemia 11/14/2017     MANNY (obstructive sleep apnea)- 'moderate' (AHI 19) 11/14/2017    Initial diagnosis ?[facility], [place].  Study Date: 8/17/2018- (154.0 lbs) It was frequently difficult to discern between RERAs and PLMs. The combined apnea/hypopnea index was 19.9 events per hour (central apnea/hypopnea index was 0.5 events per hour). The REM AHI was 74.4 events per hour. The supine AHI was n/a.  RDI was 49.0 events per hour. Baseline oxygen saturation was 91.6%. Lowest oxygen saturatio     TIA (transient ischemic attack) 2010    Possible TIA approximately 2010. Occular symptoms in left eye, likely occipital      Past Surgical History:   Past Surgical History:   Procedure Laterality Date     APPENDECTOMY OPEN CHILD  1951     C TOTAL KNEE ARTHROPLASTY Left 2011     C TOTAL KNEE ARTHROPLASTY Left 2013    revision     CARPAL TUNNEL RELEASE RT/LT Right 1995 1990s     CATARACT IOL, RT/LT  2009    right 2/4/09, left 2/21/09     CHOLECYSTECTOMY  06/2012     COLONOSCOPY  2012    normal     MOHS MICROGRAPHIC PROCEDURE  2011    back - 2011, lip and back 5/2012     ROTATOR CUFF REPAIR RT/LT Right 2009     ROTATOR CUFF REPAIR RT/LT Left 04/20/2016     TONSILLECTOMY  1945     Family History:   Family History   Problem Relation Age of Onset     Tuberculosis Mother      Myocardial Infarction Father      Coronary Artery Disease Father      Myocardial Infarction Brother      Coronary Artery Disease Brother      Heart Surgery Daughter      Diabetes No family hx of      Colon Cancer No family hx of      Breast Cancer No family hx of      Social History:   Social History     Tobacco Use     Smoking status: Never Smoker     Smokeless tobacco: Never Used   Substance Use Topics     Alcohol use: No     This document serves as a record of the services and decisions personally performed and made by JAYDEN Heller MD. It was created on his behalf by  "Bandar Ronquillo Said, a trained medical scribe. The creation of this document is based the provider's statements to the medical scribe.    Scribe Bandar Ronquillo Said 9:51 AM 10/24/2019       OBJECTIVE:  Physical Exam:  /69 (BP Location: Right arm, Patient Position: Sitting, Cuff Size: Adult Regular)   Pulse 82   Ht 1.537 m (5' 0.5\")   Wt 66.7 kg (147 lb)   SpO2 95%   BMI 28.24 kg/m    General Appearance: healthy, alert and no distress   Skin: no suspicious lesions or rashes  Neuro: Normal strength and tone, mentation intact and speech normal  Vascular: good pulses, and cappillary refill   Lymph: no lymphadenopathy   Psych:  mentation appears normal and affect normal/bright  Resp: no increased work of breathing    Neck Exam:  Cervical range of motion: Significant limitations and does not cause neck pain or reproduce shoulder pain.   Limitations: Significant and does reproduce some of her shoulder pain. No rotator cuff atrophy and no scapular winging.   Tenderness: diffuse in both shoulders including trepizus and periscapular areas.     Left Shoulder Exam:  Tender: Diffuse tenderness including trapezius and periscapular areas  AROM: Forward flexion: 130 degrees   AROM: Internal rotation: T8   PROM: Forward flexion: 150  PROM: External rotation: 170. some crepitations with ROM.  Strength: Forward flexion: 5-/5  Strength: External Rotation: Trace weakness  Impingements: Grade 2 all    Right Shoulder Exam:  Tender: Diffuse tenderness including trapezius and periscapular areas  AROM: Forward flexion: 100 degrees  AROM: Internal rotation: T8  PROM: Forward Flexion: 170  PROM: External rotation: 170  some crepitations with ROM.  Strength: Forward flexion: 5/5  Strength: External rotation: 5/5  Impingement: all Grade 2     X-rays:  Radiographs from 9/27/19 were reviewed with the patient, and demonstrate: Right shoulder: Essentially normal. Left shoulder: slightly different views but essentially normal with type 2+  " acromion.     ASSESSMENT:  1. Bilateral rotator cuff strain without definite tears on exam  2. Cervical degenerative disc disease which could be a cause of her shoulder pain.    PLAN:  Physical therapy was ordered for the neck. She should continue therapy for the shoulders. I told her that it's possible she may have small retear of her right rotator cuff but our first choice is to treat her non-operatively.  We discussed that sometimes after rotator cuff repair strength may not be 100%, even after full recovery, so the slight weakness that she has on exam could be her baseline    Return to clinic: 6-8 weeks    The information in this document, created by a scribe for me, accurately reflects the services I personally performed and the decisions made by me. I have reviewed and approved this document for accuracy.     JAYDEN Heller MD  Dept. Orthopedic Surgery  [facility]

## 2019-10-24 ENCOUNTER — ANCILLARY PROCEDURE (OUTPATIENT)
Dept: GENERAL RADIOLOGY | Facility: CLINIC | Age: 79
End: 2019-10-24
Attending: ORTHOPAEDIC SURGERY
Payer: MEDICARE

## 2019-10-24 ENCOUNTER — OFFICE VISIT (OUTPATIENT)
Dept: ORTHOPEDICS | Facility: CLINIC | Age: 79
End: 2019-10-24
Payer: MEDICARE

## 2019-10-24 VITALS
OXYGEN SATURATION: 95 % | BODY MASS INDEX: 27.75 KG/M2 | DIASTOLIC BLOOD PRESSURE: 69 MMHG | SYSTOLIC BLOOD PRESSURE: 118 MMHG | HEIGHT: 61 IN | HEART RATE: 82 BPM | WEIGHT: 147 LBS

## 2019-10-24 DIAGNOSIS — M25.512 BILATERAL SHOULDER PAIN, UNSPECIFIED CHRONICITY: Primary | ICD-10-CM

## 2019-10-24 DIAGNOSIS — M25.512 BILATERAL SHOULDER PAIN, UNSPECIFIED CHRONICITY: ICD-10-CM

## 2019-10-24 DIAGNOSIS — M25.511 BILATERAL SHOULDER PAIN, UNSPECIFIED CHRONICITY: ICD-10-CM

## 2019-10-24 DIAGNOSIS — M25.511 BILATERAL SHOULDER PAIN, UNSPECIFIED CHRONICITY: Primary | ICD-10-CM

## 2019-10-24 DIAGNOSIS — S46.019A ROTATOR CUFF STRAIN, UNSPECIFIED LATERALITY, INITIAL ENCOUNTER: ICD-10-CM

## 2019-10-24 DIAGNOSIS — M50.30 DDD (DEGENERATIVE DISC DISEASE), CERVICAL: ICD-10-CM

## 2019-10-24 PROCEDURE — 73030 X-RAY EXAM OF SHOULDER: CPT | Mod: LT

## 2019-10-24 PROCEDURE — 99213 OFFICE O/P EST LOW 20 MIN: CPT | Performed by: ORTHOPAEDIC SURGERY

## 2019-10-24 ASSESSMENT — MIFFLIN-ST. JEOR: SCORE: 1066.23

## 2019-10-24 NOTE — LETTER
10/24/2019         RE: Swati Marinelli  [address]  [place] [id number]        Dear Colleague,    Thank you for referring your patient, Swati Marinelli, to the [facility]. Please see a copy of my visit note below.    SUBJECTIVE:  Swati Marinelli is a 79 year old female who is seen in consultation at the request of Leeanne Cunha NP for bilateral shoulder pain. She fell 9/5/19 and started to have pain in the shoulders the following day. The right shoulder is worse than the left. She can't raise her arm. The pain is all the way to the shoulder blade. During her fall, she fell with her left arm straight out in front of her and her right arm was abducted. She landed on her right side where she reports the pain is worse. The pain is lateral and posteriorly to the shoulder blade. She can't move her right arm above her shoulder. No issues with her shoulders until she fell. She is experiencing less pain on her left shoulder and can move it better. She experiences a lot of neck pain and reports no numbness.    CT scan of her cervical spine: showed multi level degenerative disease.   Treatment up to this point: Physical therapy  Ortho PMH: Arthroscopic rotator cuff repairs right /left  in 2015/2016.   Left achilles tendon repair and knee revision surgery in 2012.     Review of Systems:  Constitutional:  NEGATIVE for fever, chills, change in weight  Integumentary/Skin:  NEGATIVE for worrisome rashes, moles or lesions  Eyes:  NEGATIVE for vision changes or irritation  ENT/Mouth:  NEGATIVE for ear, mouth and throat problems  Resp:  NEGATIVE for significant cough or SOB  Breast:  NEGATIVE for masses, tenderness or discharge  CV:  NEGATIVE for chest pain, palpitations or peripheral edema  GI:  NEGATIVE for nausea, abdominal pain, heartburn, or change in bowel habits  :  Negative   Musculoskeletal:  See HPI above  Neuro:  NEGATIVE for weakness, dizziness or paresthesias  Endocrine:  NEGATIVE for temperature  intolerance, skin/hair changes  Heme/allergy/immune:  NEGATIVE for bleeding problems  Psychiatric:  NEGATIVE for changes in mood or affect    Past Medical History:   Past Medical History:   Diagnosis Date     GERD (gastroesophageal reflux disease)      Hypertension goal BP (blood pressure) < 150/90 11/14/2017     Mixed hyperlipidemia 11/14/2017     MANNY (obstructive sleep apnea)- 'moderate' (AHI 19) 11/14/2017    Initial diagnosis ?[facility], [place].  Study Date: 8/17/2018- (154.0 lbs) It was frequently difficult to discern between RERAs and PLMs. The combined apnea/hypopnea index was 19.9 events per hour (central apnea/hypopnea index was 0.5 events per hour). The REM AHI was 74.4 events per hour. The supine AHI was n/a.  RDI was 49.0 events per hour. Baseline oxygen saturation was 91.6%. Lowest oxygen saturatio     TIA (transient ischemic attack) 2010    Possible TIA approximately 2010. Occular symptoms in left eye, likely occipital      Past Surgical History:   Past Surgical History:   Procedure Laterality Date     APPENDECTOMY OPEN CHILD  1951     C TOTAL KNEE ARTHROPLASTY Left 2011     C TOTAL KNEE ARTHROPLASTY Left 2013    revision     CARPAL TUNNEL RELEASE RT/LT Right 1995 1990s     CATARACT IOL, RT/LT  2009    right 2/4/09, left 2/21/09     CHOLECYSTECTOMY  06/2012     COLONOSCOPY  2012    normal     MOHS MICROGRAPHIC PROCEDURE  2011    back - 2011, lip and back 5/2012     ROTATOR CUFF REPAIR RT/LT Right 2009     ROTATOR CUFF REPAIR RT/LT Left 04/20/2016     TONSILLECTOMY  1945     Family History:   Family History   Problem Relation Age of Onset     Tuberculosis Mother      Myocardial Infarction Father      Coronary Artery Disease Father      Myocardial Infarction Brother      Coronary Artery Disease Brother      Heart Surgery Daughter      Diabetes No family hx of      Colon Cancer No family hx of      Breast Cancer No family hx of      Social History:   Social History     Tobacco Use     Smoking status: Never  "Smoker     Smokeless tobacco: Never Used   Substance Use Topics     Alcohol use: No     This document serves as a record of the services and decisions personally performed and made by JAYDEN Heller MD. It was created on his behalf by Bandar Luke, a trained medical scribe. The creation of this document is based the provider's statements to the medical scribe.    Scribe Bandar Luke 9:51 AM 10/24/2019       OBJECTIVE:  Physical Exam:  /69 (BP Location: Right arm, Patient Position: Sitting, Cuff Size: Adult Regular)   Pulse 82   Ht 1.537 m (5' 0.5\")   Wt 66.7 kg (147 lb)   SpO2 95%   BMI 28.24 kg/m     General Appearance: healthy, alert and no distress   Skin: no suspicious lesions or rashes  Neuro: Normal strength and tone, mentation intact and speech normal  Vascular: good pulses, and cappillary refill   Lymph: no lymphadenopathy   Psych:  mentation appears normal and affect normal/bright  Resp: no increased work of breathing    Neck Exam:  Cervical range of motion: Significant limitations and does not cause neck pain or reproduce shoulder pain.   Limitations: Significant and does reproduce some of her shoulder pain. No rotator cuff atrophy and no scapular winging.   Tenderness: diffuse in both shoulders including trepizus and periscapular areas.     Left Shoulder Exam:  Tender: Diffuse tenderness including trapezius and periscapular areas  AROM: Forward flexion: 130 degrees   AROM: Internal rotation: T8   PROM: Forward flexion: 150  PROM: External rotation: 170. some crepitations with ROM.  Strength: Forward flexion: 5-/5  Strength: External Rotation: Trace weakness  Impingements: Grade 2 all    Right Shoulder Exam:  Tender: Diffuse tenderness including trapezius and periscapular areas  AROM: Forward flexion: 100 degrees  AROM: Internal rotation: T8  PROM: Forward Flexion: 170  PROM: External rotation: 170  some crepitations with ROM.  Strength: Forward flexion: 5/5  Strength: External " rotation: 5/5  Impingement: all Grade 2     X-rays:  Radiographs from 9/27/19 were reviewed with the patient, and demonstrate: Right shoulder: Essentially normal. Left shoulder: slightly different views but essentially normal with type 2+  acromion.     ASSESSMENT:  1. Bilateral rotator cuff strain without definite tears on exam  2. Cervical degenerative disc disease which could be a cause of her shoulder pain.    PLAN:  Physical therapy was ordered for the neck. She should continue therapy for the shoulders. I told her that it's possible she may have small retear of her right rotator cuff but our first choice is to treat her non-operatively.  We discussed that sometimes after rotator cuff repair strength may not be 100%, even after full recovery, so the slight weakness that she has on exam could be her baseline    Return to clinic: 6-8 weeks    The information in this document, created by a scribe for me, accurately reflects the services I personally performed and the decisions made by me. I have reviewed and approved this document for accuracy.     JAYDEN Heller MD  Dept. Orthopedic Surgery  [facility]    Again, thank you for allowing me to participate in the care of your patient.        Sincerely,        Castillo Heller MD

## 2019-10-28 NOTE — PROGRESS NOTES
"SUBJECTIVE:   Swati Marinelli is a 79 year old female who presents for Preventive Visit.    Are you in the first 12 months of your Medicare coverage?  No    Healthy Habits:     In general, how would you rate your overall health?  Fair    Frequency of exercise:  2-3 days/week    Duration of exercise:  15-30 minutes    Do you usually eat at least 4 servings of fruit and vegetables a day, include whole grains    & fiber and avoid regularly eating high fat or \"junk\" foods?  No    Taking medications regularly:  Yes    Medication side effects:  Muscle aches    Ability to successfully perform activities of daily living:  Housework requires assistance    Home Safety:  No safety concerns identified    Hearing Impairment:  Difficulty following a conversation in a noisy restaurant or crowded room, feel that people are mumbling or not speaking clearly, difficulty following dialogue in the theater, difficult to understand a speaker at a public meeting or Jewish service, need to ask people to speak up or repeat themselves and difficulty understanding speech on the telephone    In the past 6 months, have you been bothered by leaking of urine? Yes    In general, how would you rate your overall mental or emotional health?  Good      PHQ-2 Total Score: 1    Additional concerns today:  Yes    Do you feel safe in your environment? Yes    Do you have a Health Care Directive? Yes, patient states has an Advance Care Planning document and will bring a copy to the clinic.      Fall risk  Fallen 2 or more times in the past year?: No  Any fall with injury in the past year?: Yes     Cognitive Screening   1) Repeat 3 items (Leader, Season, Table)    2) Clock draw: NORMAL  3) 3 item recall: Recalls 3 objects  Results: 3 items recalled: COGNITIVE IMPAIRMENT LESS LIKELY    Mini-CogTM Copyright STEPHANIE Mcneil. Licensed by the author for use in [facility]; reprinted with permission (saira@.[facility]). All rights reserved.      Do you have sleep " apnea, excessive snoring or daytime drowsiness?: CPAP    Reviewed and updated as needed this visit by clinical staff  Tobacco  Allergies  Meds  Problems  Med Hx  Surg Hx  Fam Hx  Soc Hx          Reviewed and updated as needed this visit by Provider  Tobacco  Allergies  Meds  Problems  Med Hx  Surg Hx  Fam Hx        Social History     Tobacco Use     Smoking status: Never Smoker     Smokeless tobacco: Never Used   Substance Use Topics     Alcohol use: No         Alcohol Use 10/30/2019   Prescreen: >3 drinks/day or >7 drinks/week? Not Applicable     Swati is a 79 y.o female with a PMH of HTN, HLD, anxiety, GERD, MANNY, TIA, and CKD 3 who presents for medicare wellness exam.  She reports doing well since last visit.  Her 2 daughters are with her today.    She and her daughters express concern with memory.  For the past 6-8 months they feel she has been forgetful.  Reports they will tell her something in conversation and she will then ask multiple times about the same incident or ask the same question.  Swati feels she is just not paying attention.  She denies any issues paying bills or balancing her checkbook.  She denies any issues with driving, getting lost, or forgetting her destination.  Reports she is sleeping well with her CPAP but does take it off during the night.  She enjoys reading and working puzzles.  Of note, she is taking the Meclizine daily as she has had episodes of vertigo in the past.  States she has never stopped the Meclizine to see if the dizziness returns.  Eating regular meals, staying hydrated.         Current providers sharing in care for this patient include:   Patient Care Team:  Leeanne Cunha NP as PCP - General  Deborah Terrell MD as Assigned PCP    The following health maintenance items are reviewed in Epic and correct as of today:  Health Maintenance   Topic Date Due     MEDICARE ANNUAL WELLNESS VISIT  06/03/2004     ZOSTER IMMUNIZATION (3 of 3) 11/07/2019     LIPID   01/14/2020     BMP  01/24/2020     FALL RISK ASSESSMENT  07/24/2020     ADVANCE CARE PLANNING  11/14/2022     DTAP/TDAP/TD IMMUNIZATION (2 - Td) 07/12/2028     DEXA  Completed     PHQ-2  Completed     INFLUENZA VACCINE  Completed     PNEUMOCOCCAL IMMUNIZATION 65+ LOW/MEDIUM RISK  Completed     IPV IMMUNIZATION  Aged Out     MENINGITIS IMMUNIZATION  Aged Out     BP Readings from Last 3 Encounters:   10/30/19 117/63   10/24/19 118/69   09/27/19 122/66    Wt Readings from Last 3 Encounters:   10/30/19 67.3 kg (148 lb 6.4 oz)   10/24/19 66.7 kg (147 lb)   09/27/19 68.2 kg (150 lb 6.4 oz)                  Patient Active Problem List   Diagnosis     Advanced directives, counseling/discussion     Hypertension goal BP (blood pressure) < 150/90     Neurocardiogenic syncope     Mixed hyperlipidemia     MANNY (obstructive sleep apnea)- 'moderate' (AHI 19)     Nonrheumatic aortic valve insufficiency     Anxiety     Gastroesophageal reflux disease without esophagitis     Sicca syndrome (H)     ANH positive     Primary osteoarthritis involving multiple joints     Malignant neoplasm of skin of face     CKD (chronic kidney disease) stage 3, GFR 30-59 ml/min (H)     Shoulder pain, bilateral     Past Surgical History:   Procedure Laterality Date     APPENDECTOMY OPEN CHILD  1951     C TOTAL KNEE ARTHROPLASTY Left 2011     C TOTAL KNEE ARTHROPLASTY Left 2013    revision     CARPAL TUNNEL RELEASE RT/LT Right 1995 1990s     CATARACT IOL, RT/LT  2009    right 2/4/09, left 2/21/09     CHOLECYSTECTOMY  06/2012     COLONOSCOPY  2012    normal     MOHS MICROGRAPHIC PROCEDURE  2011    back - 2011, lip and back 5/2012     ROTATOR CUFF REPAIR RT/LT Right 2009     ROTATOR CUFF REPAIR RT/LT Left 04/20/2016     TONSILLECTOMY  1945       Social History     Tobacco Use     Smoking status: Never Smoker     Smokeless tobacco: Never Used   Substance Use Topics     Alcohol use: No     Family History   Problem Relation Age of Onset     Tuberculosis  Mother      Myocardial Infarction Father      Coronary Artery Disease Father      Myocardial Infarction Brother      Coronary Artery Disease Brother      Heart Surgery Daughter      Diabetes No family hx of      Colon Cancer No family hx of      Breast Cancer No family hx of          Current Outpatient Medications   Medication Sig Dispense Refill     citalopram (CELEXA) 20 MG tablet TAKE 1 TABLET (20 MG) BY MOUTH DAILY (Patient taking differently: 20 mg TAKE 1 TABLET (20 MG) BY MOUTH DAILY) 90 tablet 1     clopidogrel (PLAVIX) 75 MG tablet TAKE 1 TABLET (75 MG) BY MOUTH DAILY 90 tablet 3     lisinopril-hydrochlorothiazide (PRINZIDE/ZESTORETIC) 20-25 MG tablet TAKE 1 TABLET EVERY DAY (Patient taking differently: Take 0.5 tablets by mouth daily 9/12/2019 patient states she takes 1/2 tablet daily.TAKE 1 TABLET EVERY DAY) 90 tablet 3     loratadine (CLARITIN) 10 MG tablet Take 1 tablet (10 mg) by mouth daily 90 tablet 3     meclizine (ANTIVERT) 25 MG tablet Take 1 tablet (25 mg) by mouth every 6 hours as needed for dizziness 30 tablet 1     order for DME autoCPAP 7-13 cmH20 1 Device 0     pantoprazole (PROTONIX) 40 MG EC tablet Take 1 tablet (40 mg) by mouth daily 90 tablet 1     potassium chloride ER (KLOR-CON) 20 MEQ CR tablet TAKE 1 TABLET EVERY DAY 90 tablet 1     rosuvastatin (CRESTOR) 5 MG tablet TAKE 1 TABLET EVERY DAY 90 tablet 1     verapamil ER (CALAN-SR) 180 MG CR tablet Take 1 tablet (180 mg) by mouth 2 times daily 180 tablet 3     Allergies   Allergen Reactions     Aleve [Naproxen]      Asa [Aspirin]      Codeine      Doxycycline      Elavil [Amitriptyline]      Etodolac      Gabapentin      Lyrica [Pregabalin]      Mushroom      Oxycodone      Oxycodone-Acetaminophen      Vicodin [Hydrocodone-Acetaminophen]        Review of Systems   Constitutional: Negative for chills and fever.   HENT: Positive for congestion and hearing loss. Negative for ear pain and sore throat.    Eyes: Positive for visual  "disturbance. Negative for pain.   Respiratory: Positive for cough. Negative for shortness of breath.    Cardiovascular: Negative for chest pain, palpitations and peripheral edema.   Gastrointestinal: Positive for diarrhea. Negative for abdominal pain, constipation, heartburn, hematochezia and nausea.   Breasts:  Negative for tenderness and breast mass.   Genitourinary: Negative for dysuria, frequency, genital sores, hematuria, pelvic pain, urgency and vaginal bleeding.   Musculoskeletal: Positive for arthralgias and myalgias. Negative for joint swelling.   Skin: Negative for rash.   Neurological: Positive for dizziness and weakness. Negative for headaches and paresthesias.   Psychiatric/Behavioral: Negative for mood changes. The patient is not nervous/anxious.        OBJECTIVE:   /63   Pulse 68   Temp 98.7  F (37.1  C) (Oral)   Ht 1.537 m (5' 0.5\")   Wt 67.3 kg (148 lb 6.4 oz)   BMI 28.51 kg/m   Estimated body mass index is 28.51 kg/m  as calculated from the following:    Height as of this encounter: 1.537 m (5' 0.5\").    Weight as of this encounter: 67.3 kg (148 lb 6.4 oz).  Physical Exam  Vitals signs reviewed.   Constitutional:       Appearance: Normal appearance. She is well-developed.   HENT:      Head: Normocephalic.   Cardiovascular:      Rate and Rhythm: Normal rate and regular rhythm.   Pulmonary:      Effort: Pulmonary effort is normal.      Breath sounds: Normal breath sounds.   Abdominal:      General: Bowel sounds are normal.      Palpations: Abdomen is soft.      Tenderness: There is no tenderness.   Skin:     General: Skin is warm and dry.   Neurological:      Mental Status: She is alert and oriented to person, place, and time.   Psychiatric:         Mood and Affect: Mood normal.         Behavior: Behavior normal.         Thought Content: Thought content normal.         Judgement: Judgment normal.           ASSESSMENT / PLAN:   1. Encounter for Medicare annual wellness exam  - repeat " "annually    2. Vertigo  - advised to stop taking the Meclizine daily and only take as needed.  We will see if this has any effect on her memory.     3. Memory difficulties  - Urine Microscopic   - UA reflex to Microscopic and Culture  - Vitamin B12    4. Gastroesophageal reflux disease without esophagitis  - she is wanting to try and wean off the Protonix.  Advised to start taking EOD.  Will follow-up in 4-6 weeks via phone call to see how she is doing.     5. Hyperlipidemia, unspecified hyperlipidemia type  - stable pending labs on statin.   - Comprehensive metabolic panel  - Lipid panel reflex to direct LDL Fasting    6. Encounter for screening mammogram for breast cancer  - *MA Screening Digital Bilateral; Future    End of Life Planning:  Patient currently has an advanced directive: Yes.  Practitioner is supportive of decision.    COUNSELING:  Reviewed preventive health counseling, as reflected in patient instructions       Regular exercise       Healthy diet/nutrition       Vision screening       Hearing screening       Fall risk prevention    Estimated body mass index is 28.51 kg/m  as calculated from the following:    Height as of this encounter: 1.537 m (5' 0.5\").    Weight as of this encounter: 67.3 kg (148 lb 6.4 oz).    Weight management plan: Discussed healthy diet and exercise guidelines     reports that she has never smoked. She has never used smokeless tobacco.      Appropriate preventive services were discussed with this patient, including applicable screening as appropriate for cardiovascular disease, diabetes, osteopenia/osteoporosis, and glaucoma.  As appropriate for age/gender, discussed screening for colorectal cancer, prostate cancer, breast cancer, and cervical cancer. Checklist reviewing preventive services available has been given to the patient.    Reviewed patients plan of care and provided an AVS. The Basic Care Plan (routine screening as documented in Health Maintenance) for June meets " the Care Plan requirement. This Care Plan has been established and reviewed with the Patient.    Counseling Resources:  ATP IV Guidelines  Pooled Cohorts Equation Calculator  Breast Cancer Risk Calculator  FRAX Risk Assessment  ICSI Preventive Guidelines  Dietary Guidelines for Americans, 2010  USDA's MyPlate  ASA Prophylaxis  Lung CA Screening    Leeanne Cunha NP  [facility]    Identified Health Risks:

## 2019-10-30 ENCOUNTER — OFFICE VISIT (OUTPATIENT)
Dept: FAMILY MEDICINE | Facility: CLINIC | Age: 79
End: 2019-10-30
Payer: MEDICARE

## 2019-10-30 VITALS
BODY MASS INDEX: 28.02 KG/M2 | DIASTOLIC BLOOD PRESSURE: 63 MMHG | WEIGHT: 148.4 LBS | HEART RATE: 68 BPM | HEIGHT: 61 IN | TEMPERATURE: 98.7 F | SYSTOLIC BLOOD PRESSURE: 117 MMHG

## 2019-10-30 DIAGNOSIS — K21.9 GASTROESOPHAGEAL REFLUX DISEASE WITHOUT ESOPHAGITIS: Chronic | ICD-10-CM

## 2019-10-30 DIAGNOSIS — E78.5 HYPERLIPIDEMIA, UNSPECIFIED HYPERLIPIDEMIA TYPE: ICD-10-CM

## 2019-10-30 DIAGNOSIS — Z00.00 ENCOUNTER FOR MEDICARE ANNUAL WELLNESS EXAM: Primary | ICD-10-CM

## 2019-10-30 DIAGNOSIS — R42 VERTIGO: ICD-10-CM

## 2019-10-30 DIAGNOSIS — Z12.31 ENCOUNTER FOR SCREENING MAMMOGRAM FOR BREAST CANCER: ICD-10-CM

## 2019-10-30 DIAGNOSIS — R41.3 MEMORY DIFFICULTIES: ICD-10-CM

## 2019-10-30 LAB
ALBUMIN SERPL-MCNC: 4.1 G/DL (ref 3.4–5)
ALBUMIN UR-MCNC: NEGATIVE MG/DL
ALP SERPL-CCNC: 71 U/L (ref 40–150)
ALT SERPL W P-5'-P-CCNC: 27 U/L (ref 0–50)
ANION GAP SERPL CALCULATED.3IONS-SCNC: 5 MMOL/L (ref 3–14)
APPEARANCE UR: CLEAR
AST SERPL W P-5'-P-CCNC: 16 U/L (ref 0–45)
BILIRUB SERPL-MCNC: 0.4 MG/DL (ref 0.2–1.3)
BILIRUB UR QL STRIP: ABNORMAL
BUN SERPL-MCNC: 20 MG/DL (ref 7–30)
CALCIUM SERPL-MCNC: 9.6 MG/DL (ref 8.5–10.1)
CHLORIDE SERPL-SCNC: 104 MMOL/L (ref 94–109)
CHOLEST SERPL-MCNC: 166 MG/DL
CO2 SERPL-SCNC: 31 MMOL/L (ref 20–32)
COLOR UR AUTO: YELLOW
CREAT SERPL-MCNC: 0.89 MG/DL (ref 0.52–1.04)
GFR SERPL CREATININE-BSD FRML MDRD: 61 ML/MIN/{1.73_M2}
GLUCOSE SERPL-MCNC: 80 MG/DL (ref 70–99)
GLUCOSE UR STRIP-MCNC: NEGATIVE MG/DL
HDLC SERPL-MCNC: 42 MG/DL
HGB UR QL STRIP: NEGATIVE
HYALINE CASTS #/AREA URNS LPF: ABNORMAL /LPF
KETONES UR STRIP-MCNC: ABNORMAL MG/DL
LDLC SERPL CALC-MCNC: 102 MG/DL
LEUKOCYTE ESTERASE UR QL STRIP: ABNORMAL
MUCOUS THREADS #/AREA URNS LPF: PRESENT /LPF
NITRATE UR QL: NEGATIVE
NON-SQ EPI CELLS #/AREA URNS LPF: ABNORMAL /LPF
NONHDLC SERPL-MCNC: 124 MG/DL
PH UR STRIP: 6 PH (ref 5–7)
POTASSIUM SERPL-SCNC: 3.8 MMOL/L (ref 3.4–5.3)
PROT SERPL-MCNC: 7.9 G/DL (ref 6.8–8.8)
RBC #/AREA URNS AUTO: ABNORMAL /HPF
SODIUM SERPL-SCNC: 140 MMOL/L (ref 133–144)
SOURCE: ABNORMAL
SP GR UR STRIP: >1.03 (ref 1–1.03)
TRIGL SERPL-MCNC: 110 MG/DL
UROBILINOGEN UR STRIP-ACNC: 0.2 EU/DL (ref 0.2–1)
VIT B12 SERPL-MCNC: 754 PG/ML (ref 193–986)
WBC #/AREA URNS AUTO: ABNORMAL /HPF

## 2019-10-30 PROCEDURE — G0439 PPPS, SUBSEQ VISIT: HCPCS | Performed by: NURSE PRACTITIONER

## 2019-10-30 PROCEDURE — 36415 COLL VENOUS BLD VENIPUNCTURE: CPT | Performed by: NURSE PRACTITIONER

## 2019-10-30 PROCEDURE — 99214 OFFICE O/P EST MOD 30 MIN: CPT | Mod: 25 | Performed by: NURSE PRACTITIONER

## 2019-10-30 PROCEDURE — 82607 VITAMIN B-12: CPT | Performed by: NURSE PRACTITIONER

## 2019-10-30 PROCEDURE — 80053 COMPREHEN METABOLIC PANEL: CPT | Performed by: NURSE PRACTITIONER

## 2019-10-30 PROCEDURE — 80061 LIPID PANEL: CPT | Performed by: NURSE PRACTITIONER

## 2019-10-30 PROCEDURE — 81001 URINALYSIS AUTO W/SCOPE: CPT | Performed by: NURSE PRACTITIONER

## 2019-10-30 ASSESSMENT — ENCOUNTER SYMPTOMS
FREQUENCY: 0
HEMATURIA: 0
EYE PAIN: 0
ARTHRALGIAS: 1
COUGH: 1
SORE THROAT: 0
WEAKNESS: 1
HEARTBURN: 0
HEMATOCHEZIA: 0
BREAST MASS: 0
PALPITATIONS: 0
CHILLS: 0
CONSTIPATION: 0
PARESTHESIAS: 0
DIARRHEA: 1
NAUSEA: 0
JOINT SWELLING: 0
MYALGIAS: 1
DYSURIA: 0
SHORTNESS OF BREATH: 0
NERVOUS/ANXIOUS: 0
ABDOMINAL PAIN: 0
HEADACHES: 0
DIZZINESS: 1
FEVER: 0

## 2019-10-30 ASSESSMENT — ACTIVITIES OF DAILY LIVING (ADL): CURRENT_FUNCTION: HOUSEWORK REQUIRES ASSISTANCE

## 2019-10-30 ASSESSMENT — MIFFLIN-ST. JEOR: SCORE: 1072.58

## 2019-10-30 NOTE — LETTER
October 31, 2019 June Yves  [address] [facility] [address] [place] [id number]            Dear June,    Your lab work and urinalysis looks good.    If you have any questions or concerns, please call myself or my nurse at [phone].    Sincerely,    Leeanne Cunha NP/klf    Results for orders placed or performed in visit on 10/30/19   Comprehensive metabolic panel   Result Value Ref Range    Sodium 140 133 - 144 mmol/L    Potassium 3.8 3.4 - 5.3 mmol/L    Chloride 104 94 - 109 mmol/L    Carbon Dioxide 31 20 - 32 mmol/L    Anion Gap 5 3 - 14 mmol/L    Glucose 80 70 - 99 mg/dL    Urea Nitrogen 20 7 - 30 mg/dL    Creatinine 0.89 0.52 - 1.04 mg/dL    GFR Estimate 61 >60 mL/min/[1.73_m2]    GFR Estimate If Black 71 >60 mL/min/[1.73_m2]    Calcium 9.6 8.5 - 10.1 mg/dL    Bilirubin Total 0.4 0.2 - 1.3 mg/dL    Albumin 4.1 3.4 - 5.0 g/dL    Protein Total 7.9 6.8 - 8.8 g/dL    Alkaline Phosphatase 71 40 - 150 U/L    ALT 27 0 - 50 U/L    AST 16 0 - 45 U/L   Lipid panel reflex to direct LDL Fasting   Result Value Ref Range    Cholesterol 166 <200 mg/dL    Triglycerides 110 <150 mg/dL    HDL Cholesterol 42 (L) >49 mg/dL    LDL Cholesterol Calculated 102 (H) <100 mg/dL    Non HDL Cholesterol 124 <130 mg/dL   UA reflex to Microscopic and Culture   Result Value Ref Range    Color Urine Yellow     Appearance Urine Clear     Glucose Urine Negative NEG^Negative mg/dL    Bilirubin Urine Small (A) NEG^Negative    Ketones Urine Trace (A) NEG^Negative mg/dL    Specific Gravity Urine >1.030 1.003 - 1.035    Blood Urine Negative NEG^Negative    pH Urine 6.0 5.0 - 7.0 pH    Protein Albumin Urine Negative NEG^Negative mg/dL    Urobilinogen Urine 0.2 0.2 - 1.0 EU/dL    Nitrite Urine Negative NEG^Negative    Leukocyte Esterase Urine Trace (A) NEG^Negative    Source Midstream Urine    Vitamin B12   Result Value Ref Range    Vitamin B12 754 193 - 986 pg/mL   Urine Microscopic   Result Value Ref Range    WBC Urine 5-10 (A) OTO5^0 - 5 /HPF     RBC Urine O - 2 OTO2^O - 2 /HPF    Hyaline Casts O - 2 OTO2^O - 2 /LPF    Squamous Epithelial /LPF Urine Many (A) FEW^Few /LPF    Mucous Urine Present (A) NEG^Negative /LPF

## 2019-10-31 ENCOUNTER — THERAPY VISIT (OUTPATIENT)
Dept: PHYSICAL THERAPY | Facility: CLINIC | Age: 79
End: 2019-10-31
Payer: MEDICARE

## 2019-10-31 DIAGNOSIS — M25.512 BILATERAL SHOULDER PAIN, UNSPECIFIED CHRONICITY: ICD-10-CM

## 2019-10-31 DIAGNOSIS — M25.511 BILATERAL SHOULDER PAIN, UNSPECIFIED CHRONICITY: ICD-10-CM

## 2019-10-31 PROCEDURE — 97112 NEUROMUSCULAR REEDUCATION: CPT | Mod: GP | Performed by: PHYSICAL THERAPIST

## 2019-10-31 PROCEDURE — 97110 THERAPEUTIC EXERCISES: CPT | Mod: GP | Performed by: PHYSICAL THERAPIST

## 2019-10-31 NOTE — PROGRESS NOTES
SUBJECTIVE  Subjective changes as noted by pt: Patient returns after a 2 week hiatus from PT. She reports having an evaluation by Dr. Heller. Per his exam, he was not highly suspicious of a tear which would necessitate surgical intervention and recommended she continue PT. Since last visit, her symptoms have improved slightly - less pain at rest. There is still pain with reaching overhead or putting coat on.   Current pain level: 0/10 (2/10 with aggrevating movements)   Changes in function:  Yes (See Goal flowsheet attached for changes in current functional level)     Adverse reaction to treatment or activity:  None    OBJECTIVE   Changes in objective findings: AROM R flexion 125, abd 160; L flexion 170, abd 170. Pain with lowering.     ASSESSMENT  June continues to require intervention to meet STG and LTG's: PT  Patient is progressing as expected.  Response to therapy has shown an improvement in  pain level and ROM   Progress made towards STG/LTG?  Yes (See Goal flowsheet attached for updates on achievement of STG and LTG)    PLAN  Current treatment program is being advanced to more complex exercises.    PTA/ATC plan:  N/A    Please refer to the daily flowsheet for treatment today, total treatment time and time spent performing 1:1 timed codes.

## 2019-10-31 NOTE — PROGRESS NOTES
PROGRESS  REPORT    Progress reporting period is from 10/17/19 to 10/31/19.       SUBJECTIVE  Subjective changes noted by patient: Patient returns after a 2 week hiatus from PT. She reports having an evaluation by Dr. Heller. Per his exam, he was not highly suspicious of a tear which would necessitate surgical intervention and recommended she continue PT. Since last visit, her symptoms have improved slightly - less pain at rest. There is still pain with reaching overhead or putting coat on.    Current pain level is 0/10 (2/10 with aggrevating movements).     Previous pain level was 10/10.   Changes in function:  Yes (See Goal flowsheet attached for changes in current functional level)  Adverse reaction to treatment or activity: None    OBJECTIVE  Changes noted in objective findings: AROM R flexion 125, abd 160; L flexion 170, abd 170. Pain with lowering from flexion and abd bilaterally.     ASSESSMENT/PLAN  Updated problem list and treatment plan: bilateral shoulder pain after a fall, suspicion of rotator cuff tear on the right    Pain -  hot/cold therapy, manual therapy, education and home program  Decreased ROM/flexibility - manual therapy, therapeutic exercise and home program  Decreased joint mobility - manual therapy, therapeutic exercise and home program  Decreased strength - therapeutic exercise, therapeutic activities and home program  Impaired muscle performance - neuro re-education and home program  Impaired posture - neuro re-education and home program STG/LTGs have been met or progress has been made towards goals:  Yes (See Goal flow sheet completed today.)  Assessment of Progress: The patient's condition is improving.  The patient's condition has potential to improve.  Self Management Plans:  Patient has been instructed in a home treatment program.  I have re-evaluated this patient and find that the nature, scope, duration and intensity of the therapy is appropriate for the medical condition of the  patient.  June continues to require the following intervention to meet STG and LTG's:  PT    Recommendations:  This patient would benefit from continued therapy.     Frequency:  2 X week, once daily  Duration:  for 2 weeks tapering to 1 X a week over 4 weeks        Please refer to the daily flowsheet for treatment today, total treatment time and time spent performing 1:1 timed codes.

## 2019-11-05 ENCOUNTER — THERAPY VISIT (OUTPATIENT)
Dept: PHYSICAL THERAPY | Facility: CLINIC | Age: 79
End: 2019-11-05
Payer: MEDICARE

## 2019-11-05 DIAGNOSIS — M25.511 BILATERAL SHOULDER PAIN, UNSPECIFIED CHRONICITY: ICD-10-CM

## 2019-11-05 DIAGNOSIS — M25.512 BILATERAL SHOULDER PAIN, UNSPECIFIED CHRONICITY: ICD-10-CM

## 2019-11-05 PROCEDURE — 97112 NEUROMUSCULAR REEDUCATION: CPT | Mod: GP | Performed by: PHYSICAL THERAPY ASSISTANT

## 2019-11-05 PROCEDURE — 97110 THERAPEUTIC EXERCISES: CPT | Mod: GP | Performed by: PHYSICAL THERAPY ASSISTANT

## 2019-11-06 ENCOUNTER — TELEPHONE (OUTPATIENT)
Dept: SLEEP MEDICINE | Facility: CLINIC | Age: 79
End: 2019-11-06

## 2019-11-06 NOTE — TELEPHONE ENCOUNTER
Returned pt call to schedule a mask fitting appointment. The pt  voice mail was not setup up for me to leave a message to return [facility] [phone] to schedule that mask fitting appointment as requested by pt.

## 2019-11-07 ENCOUNTER — THERAPY VISIT (OUTPATIENT)
Dept: PHYSICAL THERAPY | Facility: CLINIC | Age: 79
End: 2019-11-07
Payer: MEDICARE

## 2019-11-07 ENCOUNTER — ANCILLARY PROCEDURE (OUTPATIENT)
Dept: MAMMOGRAPHY | Facility: CLINIC | Age: 79
End: 2019-11-07
Attending: NURSE PRACTITIONER
Payer: MEDICARE

## 2019-11-07 DIAGNOSIS — Z12.31 ENCOUNTER FOR SCREENING MAMMOGRAM FOR BREAST CANCER: ICD-10-CM

## 2019-11-07 DIAGNOSIS — M25.511 BILATERAL SHOULDER PAIN, UNSPECIFIED CHRONICITY: ICD-10-CM

## 2019-11-07 DIAGNOSIS — M25.512 BILATERAL SHOULDER PAIN, UNSPECIFIED CHRONICITY: ICD-10-CM

## 2019-11-07 PROCEDURE — 97110 THERAPEUTIC EXERCISES: CPT | Mod: GP | Performed by: PHYSICAL THERAPY ASSISTANT

## 2019-11-07 PROCEDURE — 97112 NEUROMUSCULAR REEDUCATION: CPT | Mod: GP | Performed by: PHYSICAL THERAPY ASSISTANT

## 2019-11-07 PROCEDURE — 77063 BREAST TOMOSYNTHESIS BI: CPT | Mod: TC

## 2019-11-07 PROCEDURE — 77067 SCR MAMMO BI INCL CAD: CPT | Mod: TC

## 2019-11-11 ENCOUNTER — OFFICE VISIT (OUTPATIENT)
Dept: URGENT CARE | Facility: URGENT CARE | Age: 79
End: 2019-11-11
Payer: MEDICARE

## 2019-11-11 ENCOUNTER — ANCILLARY PROCEDURE (OUTPATIENT)
Dept: GENERAL RADIOLOGY | Facility: CLINIC | Age: 79
End: 2019-11-11
Attending: NURSE PRACTITIONER
Payer: MEDICARE

## 2019-11-11 VITALS
OXYGEN SATURATION: 98 % | SYSTOLIC BLOOD PRESSURE: 134 MMHG | TEMPERATURE: 98.2 F | HEART RATE: 90 BPM | WEIGHT: 151 LBS | BODY MASS INDEX: 29 KG/M2 | DIASTOLIC BLOOD PRESSURE: 67 MMHG
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
## 2019-11-11 DIAGNOSIS — S52.121A CLOSED TRAUMATIC MINIMALLY DISPLACED FRACTURE OF HEAD OF RIGHT RADIUS, INITIAL ENCOUNTER: ICD-10-CM
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
## 2019-11-11 PROCEDURE — 73110 X-RAY EXAM OF WRIST: CPT | Mod: RT

## 2019-11-11 PROCEDURE — 99214 OFFICE O/P EST MOD 30 MIN: CPT | Performed by: NURSE PRACTITIONER

## 2019-11-11 PROCEDURE — 73070 X-RAY EXAM OF ELBOW: CPT | Mod: RT

## 2019-11-11 ASSESSMENT — ENCOUNTER SYMPTOMS
RESPIRATORY NEGATIVE: 1
HEADACHES: 0
CONSTITUTIONAL NEGATIVE: 1
SEIZURES: 0
CARDIOVASCULAR NEGATIVE: 1
DIZZINESS: 0
LOSS OF CONSCIOUSNESS: 0

## 2019-11-11 ASSESSMENT — PAIN SCALES - GENERAL: PAINLEVEL: MODERATE PAIN (5)

## 2019-11-11 NOTE — PROGRESS NOTES
HPI  Swati Rich 79 year old female presents to the urgent care with pain in her right wrist and right elbow after sustaining a fall last evening approximately 24 hours ago.  She reports that she tripped a cement barrier in a parking lot.  She denies striking her head or having a loss of consciousness.  She has been applying heat and has found this to provide some comfort.  She notes limited range of motion of the right wrist where she is having difficulty flexing and the right elbow where she is unable to fully extend.  She denies any significant bruising or deformity.    Past Medical History:   Diagnosis Date     GERD (gastroesophageal reflux disease)      Hypertension goal BP (blood pressure) < 150/90 11/14/2017     Mixed hyperlipidemia 11/14/2017     MANNY (obstructive sleep apnea)- 'moderate' (AHI 19) 11/14/2017    Initial diagnosis ?[facility], [place].  Study Date: 8/17/2018- (154.0 lbs) It was frequently difficult to discern between RERAs and PLMs. The combined apnea/hypopnea index was 19.9 events per hour (central apnea/hypopnea index was 0.5 events per hour). The REM AHI was 74.4 events per hour. The supine AHI was n/a.  RDI was 49.0 events per hour. Baseline oxygen saturation was 91.6%. Lowest oxygen saturatio     TIA (transient ischemic attack) 2010    Possible TIA approximately 2010. Occular symptoms in left eye, likely occipital      Patient Active Problem List   Diagnosis     Advanced directives, counseling/discussion     Hypertension goal BP (blood pressure) < 150/90     Neurocardiogenic syncope     Mixed hyperlipidemia     MANNY (obstructive sleep apnea)- 'moderate' (AHI 19)     Nonrheumatic aortic valve insufficiency     Anxiety     Gastroesophageal reflux disease without esophagitis     Sicca syndrome (H)     ANH positive     Primary osteoarthritis involving multiple joints     Malignant neoplasm of skin of face     CKD (chronic kidney disease) stage 3, GFR 30-59 ml/min (H)     Shoulder pain, bilateral       Past Surgical History:   Procedure Laterality Date     APPENDECTOMY OPEN CHILD  1951     C TOTAL KNEE ARTHROPLASTY Left 2011     C TOTAL KNEE ARTHROPLASTY Left 2013    revision     CARPAL TUNNEL RELEASE RT/LT Right 1995 1990s     CATARACT IOL, RT/LT  2009    right 2/4/09, left 2/21/09     CHOLECYSTECTOMY  06/2012     COLONOSCOPY  2012    normal     MOHS MICROGRAPHIC PROCEDURE  2011    back - 2011, lip and back 5/2012     ROTATOR CUFF REPAIR RT/LT Right 2009     ROTATOR CUFF REPAIR RT/LT Left 04/20/2016     TONSILLECTOMY  1945      Allergies   Allergen Reactions     Aleve [Naproxen]      Asa [Aspirin]      Codeine      Doxycycline      Elavil [Amitriptyline]      Etodolac      Gabapentin      Lyrica [Pregabalin]      Mushroom      Oxycodone      Oxycodone-Acetaminophen      Vicodin [Hydrocodone-Acetaminophen]      Current Outpatient Medications   Medication     citalopram (CELEXA) 20 MG tablet     clopidogrel (PLAVIX) 75 MG tablet     lisinopril-hydrochlorothiazide (PRINZIDE/ZESTORETIC) 20-25 MG tablet     loratadine (CLARITIN) 10 MG tablet     meclizine (ANTIVERT) 25 MG tablet     order for DME     pantoprazole (PROTONIX) 40 MG EC tablet     potassium chloride ER (KLOR-CON) 20 MEQ CR tablet     rosuvastatin (CRESTOR) 5 MG tablet     verapamil ER (CALAN-SR) 180 MG CR tablet     Current Facility-Administered Medications   Medication     lidocaine 1 % injection 1 mL     lidocaine 1 % injection 1 mL     methylPREDNISolone (DEPO-MEDROL) injection 80 mg     methylPREDNISolone (DEPO-MEDROL) injection 80 mg         Review of Systems   Constitutional: Negative.    Respiratory: Negative.    Cardiovascular: Negative.    Musculoskeletal: Positive for joint pain.        Right elbow and right wrist. Negative for bruising or deformity     Skin: Negative.    Neurological: Negative for dizziness, seizures, loss of consciousness and headaches.         Physical Exam  Vitals signs and nursing note reviewed.   Constitutional:        Appearance: Normal appearance.      Comments: /67   Pulse 90   Temp 98.2  F (36.8  C) (Oral)   Wt 68.5 kg (151 lb)   SpO2 98%   BMI 29.00 kg/m       HENT:      Head: Normocephalic.   Cardiovascular:      Rate and Rhythm: Normal rate.   Pulmonary:      Effort: Pulmonary effort is normal.   Musculoskeletal:         General: Swelling and tenderness present.      Comments: Slight swelling of the right wrist, no deformity or bruising. No swelling of the wrist. Unable to fully extend the right elbow. Point tender at radial head. Again no bruising or deformity.    Skin:     Findings: No abrasion, bruising or erythema.   Neurological:      Mental Status: She is alert and oriented to person, place, and time.   Psychiatric:         Mood and Affect: Mood normal.       Wrist xray negative  Elbow xray positive for minimally displaced radial head fracture    Assessment:  1. Fall, initial encounter    2. Closed traumatic minimally displaced fracture of head of right radius, initial encounter        Plan:  Orders Placed This Encounter     ARM SLING, M     XR Elbow Right 2 Views     XR Wrist Right G/E 3 Views     ORTHOPEDICS ADULT REFERRAL   Tylenol as directed for pain  Ortho call tomorrow for follow up appt.   Instructions regarding self-care of patient/child reviewed.   Written instructions provided in after visit summary and reviewed.  Patient instructed to see primary care provider for new or persistent symptoms.   Red flag symptoms reviewed and patient has been instructed to seek emergent care  Please contact pharmacy for medication questions.  Patient instructed to take medications as directed on package.      Terra Noriega, DNP, APRN, CNP

## 2019-11-12 NOTE — PATIENT INSTRUCTIONS
Patient Education     Elbow Fracture    You have a break (fracture) of one or more bones of your elbow joint. This may be a small crack in the bone. Or it may be a major break, with the broken parts pushed out of position.  This fracture usually takes 4 to 12 weeks to heal, depending on the type. The first step in treatment is with a splint or cast. Severe fractures may need surgery to put the bone fragments back into place.  Home care  Follow these guidelines when caring for yourself at home:    Keep your arm elevated to reduce pain and swelling. When sitting or lying down keep your arm above the level of your heart. You can do this by placing your arm on a pillow that rests on your chest or on a pillow at your side. This is most important during the first 2 days (48 hours) after the injury.    Put an ice pack on the injured area. Do this for 20 minutes every 1 to 2 hours the first day. You can make an ice pack by wrapping a plastic bag of ice cubes in a thin towel. As the ice melts, be careful that the cast or splint doesn t get wet. You can place the ice pack inside the sling and directly over the splint or cast. Continue to use the ice pack 3 to 4 times a day for the next 2 days. Then use the ice pack as needed to ease pain and swelling.    Keep the splint or cast completely dry at all times. Bathe with your splint or cast out of the water. Protect it with a large plastic bag, rubber-banded at the top end. If a fiberglass splint or cast gets wet, you can dry it with a hair dryer.    You may use acetaminophen or ibuprofen to control pain, unless another pain medicine was prescribed. If you have chronic liver or kidney disease, talk with your healthcare provider before using these medicines. Also talk with your provider if you ve had a stomach ulcer or gastrointestinal bleeding.    Don t put creams or objects under the cast if you have itching.  Follow-up care  Follow up with your healthcare provider in 1 week,  or as advised. This is to make sure the bone is healing the way it should. If a splint was put on, it may be changed to a cast during your follow-up visit.  X-rays may be taken. You will be told of any new findings that may affect your care.  When to seek medical advice  Call your healthcare provider right away if any of these occur:    The cast or splint cracks    The plaster cast or splint becomes wet or soft    The fiberglass cast or splint stays wet for more than 24 hours    Tightness or pain under the cast or splint gets worse    Bad odor from the cast or wound fluid stains the cast    Fingers become swollen, cold, blue, numb, or tingly    You can t move your fingers    Skin around cast becomes red    Fever of 100.4 F (38 C) or higher, or as directed by your healthcare provider   Date Last Reviewed: 2/6/2017 2000-2018 [employer] [address] [place] [address]. All rights reserved. This information is not intended as a substitute for professional medical care. Always follow your healthcare professional's instructions.

## 2019-11-13 NOTE — PROGRESS NOTES
14 DAY STM VISIT    Diagnostic AHI: 19.9 PSG    Subjective measures:   Pt states things are going well.  She wants to get a chinstrap because her mouth is opening during the night.  She is also interested in a CPAP pole to hold her tubing over bed.  Pt is benefiting from therapy.    Assessment: Pt meeting objective benchmarks.  Patient meeting subjective benchmarks.   Action plan: pt to have 30 day STM visit.    Device type: Auto-CPAP  PAP settings: CPAP min 5.0 cm  H20     CPAP max 15.0 cm  H20    95th% pressure 10.9 cm   Mask type:  Nasal Pillows  Objective measures: 14 day rolling measures      Compliance  92 %      Leak  27.51 lpm  last  upload      AHI 1.99   last  upload      Average number of minutes 475     Average hours of usage 7.9          Objective measure goal  Compliance   Goal >70%  Leak   Goal < 24 lpm  AHI  Goal < 5  Usage  Goal >240   Universal Safety Interventions

## 2019-11-13 NOTE — PROGRESS NOTES
"SUBJECTIVE:  Swati Marinelli is being seen today for closed traumatic minimally displaced fracture of head of right radius. She fell on outstretched hands three days ago. She went to urgent care on 11/11/2019 for pain in her right wrist and elbow after sustaining a fall. A radial head fracture was diagnosed and she was placed in a sling. She denies any numbness and tingling in the fingers. She reports her pain has decreased since the fall. She reports her left elbow hurts a lot as well.     Ortho PMH: We have seen her a month ago about her shoulders with rotator cuff strain and possible cervical mediated pain.     Treatments tried so far: She has been going to physical therapy for her shoulders.     Review of Systems:  Constitutional/General: Negative for fever, chills, change in weight  Integumentary/Skin: Negative for worrisome rashes, moles, or lesions  Neuro: Negative for weakness, dizziness, or paresthesias   Psychiatric: negative for changes in mood or affect    This document serves as a record of the services and decisions personally performed and made by JAYDEN Heller MD. It was created on his behalf by Bandar Luke, a trained medical scribe. The creation of this document is based the provider's statements to the medical scribe.    Scribe Bandar Luke 8:51 AM 11/14/2019       OBJECTIVE:  Physical Exam:  /65 (BP Location: Left arm, Patient Position: Sitting, Cuff Size: Adult Regular)   Pulse 81   Ht 1.537 m (5' 0.5\")   Wt 68.5 kg (151 lb)   SpO2 96%   BMI 29.00 kg/m    General Appearance: healthy, alert and no distress   Skin: no suspicious lesions or rashes  Neuro: Normal strength and tone, mentation intact and speech normal  Vascular: good pulses, and capillary refill   Lymph: no lymphadenopathy   Psych:  mentation appears normal and affect normal/bright  Resp: no increased work of breathing    Right Wrist/Elbow Exam:  Inspection: Swelling of the wrist  Tenderness: Diffuse around the wrist " and elbows  ROM:   Pronation: Full, with pain  Supination: Full, with pain    Left Elbow:  Tenderness: Diffuse over the elbow and wrist  ROM: Full, elbow and wrist  She reports most pain of her pain is in her shoulder but demonstrates normal rotator cuff strength and ROM.     Radiographs of the wrist from 11/11/2019 were reviewed with the patient, and demonstrate: normal    Radiographs of the elbow from 11/2019 were reviewed with the patient, and demonstrate: Radial head fracture with approximately 0.2 cm depression  of the lateral third of the articular surface. Abnormal fat pad sign.  indicates joint effusion    ASSESSMENT:  Encounter Diagnoses   Name Primary?     Closed displaced fracture of head of right radius, initial encounter Yes     Sprain of right wrist, initial encounter      PLAN:  Velcro wrist brace was given.   I recommended she works on ROM of her elbow and wrist.  Continue physical therapy.  She has some itching from the ACE wrap, and I told her that she did not have to wear it any longer.     Return to clinic: AS NEEDED     The information in this document, created by a scribe for me, accurately reflects the services I personally performed and the decisions made by me. I have reviewed and approved this document for accuracy.     JAYDEN Heller MD  Dept. Orthopedic Surgery  [facility]

## 2019-11-14 ENCOUNTER — OFFICE VISIT (OUTPATIENT)
Dept: ORTHOPEDICS | Facility: CLINIC | Age: 79
End: 2019-11-14
Payer: MEDICARE

## 2019-11-14 VITALS
WEIGHT: 151 LBS | OXYGEN SATURATION: 96 % | HEART RATE: 81 BPM | BODY MASS INDEX: 28.51 KG/M2 | HEIGHT: 61 IN | DIASTOLIC BLOOD PRESSURE: 65 MMHG | SYSTOLIC BLOOD PRESSURE: 129 MMHG

## 2019-11-14 DIAGNOSIS — S63.501A SPRAIN OF RIGHT WRIST, INITIAL ENCOUNTER: ICD-10-CM

## 2019-11-14 DIAGNOSIS — S52.121A CLOSED DISPLACED FRACTURE OF HEAD OF RIGHT RADIUS, INITIAL ENCOUNTER: Primary | ICD-10-CM

## 2019-11-14 PROCEDURE — 99213 OFFICE O/P EST LOW 20 MIN: CPT | Mod: 57 | Performed by: ORTHOPAEDIC SURGERY

## 2019-11-14 PROCEDURE — 24650 CLTX RDL HEAD/NCK FX WO MNPJ: CPT | Performed by: ORTHOPAEDIC SURGERY

## 2019-11-14 ASSESSMENT — MIFFLIN-ST. JEOR: SCORE: 1084.37

## 2019-11-14 NOTE — LETTER
"    11/14/2019         RE: Swati Marinelli  [address]  [place] [id number]        Dear Colleague,    Thank you for referring your patient, Swati Marinelli, to the [facility]. Please see a copy of my visit note below.    SUBJECTIVE:  Swati Marinelli is being seen today for closed traumatic minimally displaced fracture of head of right radius. She fell on outstretched hands three days ago. She went to urgent care on 11/11/2019 for pain in her right wrist and elbow after sustaining a fall. A radial head fracture was diagnosed and she was placed in a sling. She denies any numbness and tingling in the fingers. She reports her pain has decreased since the fall. She reports her left elbow hurts a lot as well.     Ortho PMH: We have seen her a month ago about her shoulders with rotator cuff strain and possible cervical mediated pain.     Treatments tried so far: She has been going to physical therapy for her shoulders.     Review of Systems:  Constitutional/General: Negative for fever, chills, change in weight  Integumentary/Skin: Negative for worrisome rashes, moles, or lesions  Neuro: Negative for weakness, dizziness, or paresthesias   Psychiatric: negative for changes in mood or affect    This document serves as a record of the services and decisions personally performed and made by JAYDEN Heller MD. It was created on his behalf by Bandar Luke, a trained medical scribe. The creation of this document is based the provider's statements to the medical scribe.    Scribe Bandar Ronquillo Said 8:51 AM 11/14/2019       OBJECTIVE:  Physical Exam:  /65 (BP Location: Left arm, Patient Position: Sitting, Cuff Size: Adult Regular)   Pulse 81   Ht 1.537 m (5' 0.5\")   Wt 68.5 kg (151 lb)   SpO2 96%   BMI 29.00 kg/m     General Appearance: healthy, alert and no distress   Skin: no suspicious lesions or rashes  Neuro: Normal strength and tone, mentation intact and speech normal  Vascular: good pulses, and " capillary refill   Lymph: no lymphadenopathy   Psych:  mentation appears normal and affect normal/bright  Resp: no increased work of breathing    Right Wrist/Elbow Exam:  Inspection: Swelling of the wrist  Tenderness: Diffuse around the wrist and elbows  ROM:   Pronation: Full, with pain  Supination: Full, with pain    Left Elbow:  Tenderness: Diffuse over the elbow and wrist  ROM: Full, elbow and wrist  She reports most pain of her pain is in her shoulder but demonstrates normal rotator cuff strength and ROM.     Radiographs of the wrist from 11/11/2019 were reviewed with the patient, and demonstrate: normal    Radiographs of the elbow from 11/2019 were reviewed with the patient, and demonstrate: Radial head fracture with approximately 0.2 cm depression  of the lateral third of the articular surface. Abnormal fat pad sign.  indicates joint effusion    ASSESSMENT:  Encounter Diagnoses   Name Primary?     Closed displaced fracture of head of right radius, initial encounter Yes     Sprain of right wrist, initial encounter      PLAN:  Velcro wrist brace was given.   I recommended she works on ROM of her elbow and wrist.  Continue physical therapy.  She has some itching from the ACE wrap, and I told her that she did not have to wear it any longer.     Return to clinic: AS NEEDED     The information in this document, created by a scribe for me, accurately reflects the services I personally performed and the decisions made by me. I have reviewed and approved this document for accuracy.     JAYDEN Heller MD  Dept. Orthopedic Surgery  [facility]    Again, thank you for allowing me to participate in the care of your patient.        Sincerely,        Castillo Heller MD

## 2019-12-30 DIAGNOSIS — F41.9 ANXIETY: ICD-10-CM

## 2019-12-31 RX ORDER — CITALOPRAM HYDROBROMIDE 20 MG/1
20 TABLET ORAL DAILY
Qty: 90 TABLET | Refills: 0 | Status: SHIPPED | OUTPATIENT
Start: 2019-12-31 | End: 2020-01-29

## 2020-01-02 ENCOUNTER — TELEPHONE (OUTPATIENT)
Dept: FAMILY MEDICINE | Facility: CLINIC | Age: 80
End: 2020-01-02

## 2020-01-02 DIAGNOSIS — E78.2 MIXED HYPERLIPIDEMIA: Chronic | ICD-10-CM

## 2020-01-02 RX ORDER — ROSUVASTATIN CALCIUM 5 MG/1
TABLET, COATED ORAL
Qty: 90 TABLET | Refills: 2 | Status: SHIPPED | OUTPATIENT
Start: 2020-01-02 | End: 2020-01-29

## 2020-01-02 NOTE — TELEPHONE ENCOUNTER
citolapam refill request received on 12/30/19 and filled same day. Other request received today and filled.  Pt notified.  Solange ROMERON, RN

## 2020-01-02 NOTE — TELEPHONE ENCOUNTER
Reason for Call:  Medication or medication refill:    Do you use a [facility]?  Name of the pharmacy and phone number for the current request:  mail orders    Name of the medication requested: rosuvastatin, and citalopram stated pharmacy has been trying to get a hold of clinic     Other request: patient would like a refill for these medication     Can we leave a detailed message on this number? YES    Phone number patient can be reached at: Home number on file [phone] (home)    Best Time:     Call taken on 1/2/2020 at 12:10 PM by Soila Mattson

## 2020-01-29 ENCOUNTER — TELEPHONE (OUTPATIENT)
Dept: FAMILY MEDICINE | Facility: CLINIC | Age: 80
End: 2020-01-29

## 2020-01-29 DIAGNOSIS — H91.93 BILATERAL HEARING LOSS, UNSPECIFIED HEARING LOSS TYPE: ICD-10-CM

## 2020-01-29 DIAGNOSIS — J30.9 ALLERGIC RHINITIS, UNSPECIFIED SEASONALITY, UNSPECIFIED TRIGGER: ICD-10-CM

## 2020-01-29 DIAGNOSIS — I10 HYPERTENSION GOAL BP (BLOOD PRESSURE) < 150/90: Chronic | ICD-10-CM

## 2020-01-29 DIAGNOSIS — E78.2 MIXED HYPERLIPIDEMIA: Chronic | ICD-10-CM

## 2020-01-29 DIAGNOSIS — G45.9 TRANSIENT CEREBRAL ISCHEMIA, UNSPECIFIED TYPE: ICD-10-CM

## 2020-01-29 DIAGNOSIS — F41.9 ANXIETY: ICD-10-CM

## 2020-01-29 DIAGNOSIS — K21.9 GASTROESOPHAGEAL REFLUX DISEASE WITHOUT ESOPHAGITIS: Chronic | ICD-10-CM

## 2020-01-29 RX ORDER — PANTOPRAZOLE SODIUM 40 MG/1
40 TABLET, DELAYED RELEASE ORAL DAILY
Qty: 90 TABLET | Refills: 1 | Status: SHIPPED | OUTPATIENT
Start: 2020-01-29 | End: 2020-01-29

## 2020-01-29 RX ORDER — LISINOPRIL AND HYDROCHLOROTHIAZIDE 20; 25 MG/1; MG/1
TABLET ORAL
Qty: 90 TABLET | Refills: 3 | Status: SHIPPED | OUTPATIENT
Start: 2020-01-29 | End: 2020-01-29

## 2020-01-29 RX ORDER — LORATADINE 10 MG/1
10 TABLET ORAL DAILY
Qty: 90 TABLET | Refills: 1 | Status: SHIPPED | OUTPATIENT
Start: 2020-01-29 | End: 2020-03-13

## 2020-01-29 RX ORDER — ROSUVASTATIN CALCIUM 5 MG/1
TABLET, COATED ORAL
Qty: 90 TABLET | Refills: 2 | Status: SHIPPED | OUTPATIENT
Start: 2020-01-29 | End: 2020-04-06

## 2020-01-29 RX ORDER — VERAPAMIL HYDROCHLORIDE 180 MG/1
180 TABLET, EXTENDED RELEASE ORAL 2 TIMES DAILY
Qty: 180 TABLET | Refills: 1 | Status: SHIPPED | OUTPATIENT
Start: 2020-01-29 | End: 2020-04-06

## 2020-01-29 RX ORDER — LISINOPRIL AND HYDROCHLOROTHIAZIDE 20; 25 MG/1; MG/1
TABLET ORAL
Qty: 90 TABLET | Refills: 3 | Status: SHIPPED | OUTPATIENT
Start: 2020-01-29 | End: 2020-04-06

## 2020-01-29 RX ORDER — CITALOPRAM HYDROBROMIDE 20 MG/1
20 TABLET ORAL DAILY
Qty: 90 TABLET | Refills: 0 | Status: SHIPPED | OUTPATIENT
Start: 2020-01-29 | End: 2020-01-29

## 2020-01-29 RX ORDER — PANTOPRAZOLE SODIUM 40 MG/1
40 TABLET, DELAYED RELEASE ORAL DAILY
Qty: 90 TABLET | Refills: 1 | Status: SHIPPED | OUTPATIENT
Start: 2020-01-29 | End: 2020-04-06

## 2020-01-29 RX ORDER — MECLIZINE HYDROCHLORIDE 25 MG/1
25 TABLET ORAL EVERY 6 HOURS PRN
Qty: 30 TABLET | Refills: 1 | Status: SHIPPED | OUTPATIENT
Start: 2020-01-29 | End: 2020-04-01

## 2020-01-29 RX ORDER — VERAPAMIL HYDROCHLORIDE 180 MG/1
180 TABLET, EXTENDED RELEASE ORAL 2 TIMES DAILY
Qty: 180 TABLET | Refills: 1 | Status: SHIPPED | OUTPATIENT
Start: 2020-01-29 | End: 2020-01-29

## 2020-01-29 RX ORDER — CITALOPRAM HYDROBROMIDE 20 MG/1
20 TABLET ORAL DAILY
Qty: 90 TABLET | Refills: 0 | Status: SHIPPED | OUTPATIENT
Start: 2020-01-29 | End: 2020-04-06

## 2020-01-29 RX ORDER — POTASSIUM CHLORIDE 1500 MG/1
TABLET, EXTENDED RELEASE ORAL
Qty: 90 TABLET | Refills: 1 | Status: SHIPPED | OUTPATIENT
Start: 2020-01-29 | End: 2020-04-06

## 2020-01-29 RX ORDER — POTASSIUM CHLORIDE 1500 MG/1
TABLET, EXTENDED RELEASE ORAL
Qty: 90 TABLET | Refills: 1 | Status: SHIPPED | OUTPATIENT
Start: 2020-01-29 | End: 2020-01-29

## 2020-01-29 RX ORDER — LORATADINE 10 MG/1
10 TABLET ORAL DAILY
Qty: 90 TABLET | Refills: 1 | Status: SHIPPED | OUTPATIENT
Start: 2020-01-29 | End: 2020-01-29

## 2020-01-29 RX ORDER — CLOPIDOGREL BISULFATE 75 MG/1
TABLET ORAL
Qty: 90 TABLET | Refills: 1 | Status: SHIPPED | OUTPATIENT
Start: 2020-01-29 | End: 2020-04-06

## 2020-01-29 RX ORDER — ROSUVASTATIN CALCIUM 5 MG/1
TABLET, COATED ORAL
Qty: 90 TABLET | Refills: 2 | Status: SHIPPED | OUTPATIENT
Start: 2020-01-29 | End: 2020-01-29

## 2020-01-29 RX ORDER — CLOPIDOGREL BISULFATE 75 MG/1
TABLET ORAL
Qty: 90 TABLET | Refills: 1 | Status: SHIPPED | OUTPATIENT
Start: 2020-01-29 | End: 2020-01-29

## 2020-01-29 RX ORDER — MECLIZINE HYDROCHLORIDE 25 MG/1
25 TABLET ORAL EVERY 6 HOURS PRN
Qty: 30 TABLET | Refills: 1 | Status: SHIPPED | OUTPATIENT
Start: 2020-01-29 | End: 2020-01-29

## 2020-01-29 NOTE — TELEPHONE ENCOUNTER
Reason for Call:  Other prescription    Detailed comments: Patient stopped by to ask that her prescriptions be called to [facility]. She states she has new insurance no longer [employer]. She now has [employer]. Please call patient to advise. Thank you     Phone Number Patient can be reached at: Home number on file [phone] (home)    Best Time: ASAP    Can we leave a detailed message on this number? YES    Call taken on 1/29/2020 at 8:55 AM by Phyllis Dennis

## 2020-03-13 ENCOUNTER — TELEPHONE (OUTPATIENT)
Dept: FAMILY MEDICINE | Facility: CLINIC | Age: 80
End: 2020-03-13

## 2020-03-13 DIAGNOSIS — J30.9 ALLERGIC RHINITIS, UNSPECIFIED SEASONALITY, UNSPECIFIED TRIGGER: ICD-10-CM

## 2020-03-13 RX ORDER — LORATADINE 10 MG/1
10 TABLET ORAL DAILY
Qty: 90 TABLET | Refills: 1 | COMMUNITY
Start: 2020-03-13 | End: 2024-01-04

## 2020-03-13 NOTE — TELEPHONE ENCOUNTER
Pharmacy profile updated.  Historically, noted Loratadine is over the counter, do not send refills.   Jada Humphrey RN

## 2020-03-13 NOTE — TELEPHONE ENCOUNTER
Reason for Call:  Other call back    Detailed comments: pt wanted to let Dr. Terrell to know that due to her new insurance she will be changing  Her pharmacy from [facility] to [facility] [id number] [place] [address]  She also said that she will no longer need refills on Loratadine, she is able to get them otc    Phone Number Patient can be reached at: Home number on file [phone] (home)    Best Time: any time    Can we leave a detailed message on this number? YES    Call taken on 3/13/2020 at 12:21 PM by Heide Smyth

## 2020-03-24 NOTE — PROGRESS NOTES
Patient did not return for further treatment and no additional progress was noted. Of note, patient cancelled future visits after falling and breaking her elbow on 11/11/19. Please refer to the progress note and goal flowsheet completed on 10/17/19 for discharge information.

## 2020-03-31 ENCOUNTER — TELEPHONE (OUTPATIENT)
Dept: FAMILY MEDICINE | Facility: CLINIC | Age: 80
End: 2020-03-31

## 2020-03-31 NOTE — TELEPHONE ENCOUNTER
Message: Please clarify the dosage form for prescription Potassium CL 20MEQ. It is available as Potassium CL CR WM TB 20MEQ and Potassium CL SR MC 20MEQ. Thank you.

## 2020-03-31 NOTE — TELEPHONE ENCOUNTER
[facility] pharmacy phone number listed above is incorrect.  The last refill of this medication was not sent to [facility], it was sent to [facility] 1/29/20.  Will wait for another request to come through.    Joanne ROMERON, RN

## 2020-04-01 DIAGNOSIS — H91.93 BILATERAL HEARING LOSS, UNSPECIFIED HEARING LOSS TYPE: ICD-10-CM

## 2020-04-01 RX ORDER — MECLIZINE HYDROCHLORIDE 25 MG/1
25 TABLET ORAL EVERY 6 HOURS PRN
Qty: 30 TABLET | Refills: 1 | Status: SHIPPED | OUTPATIENT
Start: 2020-04-01 | End: 2022-02-16

## 2020-04-03 DIAGNOSIS — I10 HYPERTENSION GOAL BP (BLOOD PRESSURE) < 150/90: Chronic | ICD-10-CM

## 2020-04-03 DIAGNOSIS — E78.2 MIXED HYPERLIPIDEMIA: Chronic | ICD-10-CM

## 2020-04-03 DIAGNOSIS — G45.9 TRANSIENT CEREBRAL ISCHEMIA, UNSPECIFIED TYPE: ICD-10-CM

## 2020-04-03 DIAGNOSIS — K21.9 GASTROESOPHAGEAL REFLUX DISEASE WITHOUT ESOPHAGITIS: Chronic | ICD-10-CM

## 2020-04-03 DIAGNOSIS — F41.9 ANXIETY: ICD-10-CM

## 2020-04-03 DIAGNOSIS — H91.93 BILATERAL HEARING LOSS, UNSPECIFIED HEARING LOSS TYPE: ICD-10-CM

## 2020-04-03 DIAGNOSIS — J30.9 ALLERGIC RHINITIS, UNSPECIFIED SEASONALITY, UNSPECIFIED TRIGGER: ICD-10-CM

## 2020-04-03 NOTE — TELEPHONE ENCOUNTER
"Routing refill request to provider for review/approval because:  Pt is due for office visit 4/2020.   Labs not current: PLT and Hgb  Please review and advise regarding refill request of all medications, and advise if virtual visit follow-up is appropriate.      Requested Prescriptions   Pending Prescriptions Disp Refills     citalopram (CELEXA) 20 MG tablet 90 tablet 0     Sig: Take 1 tablet (20 mg) by mouth daily       SSRIs Protocol Passed - 4/3/2020  4:12 PM        Passed - Recent (12 mo) or future (30 days) visit within the authorizing provider's specialty     Patient has had an office visit with the authorizing provider or a provider within the authorizing providers department within the previous 12 mos or has a future within next 30 days. See \"Patient Info\" tab in inbasket, or \"Choose Columns\" in Meds & Orders section of the refill encounter.          Passed - Medication is active on med list        Passed - Patient is age 18 or older        Passed - No active pregnancy on record        Passed - No positive pregnancy test in last 12 months           clopidogrel (PLAVIX) 75 MG tablet 90 tablet 1     Sig: TAKE 1 TABLET (75 MG) BY MOUTH DAILY       Plavix Failed - 4/3/2020  4:12 PM        Failed - Normal HGB on file in past 12 months     Recent Labs   Lab Test 04/19/18  1105   HGB 15.2           Failed - Normal Platelets on file in past 12 months     Recent Labs   Lab Test 04/19/18  1105              Passed - No active PPI on record unless is Protonix        Passed - Recent (12 mo) or future (30 days) visit within the authorizing provider's specialty     Patient has had an office visit with the authorizing provider or a provider within the authorizing providers department within the previous 12 mos or has a future within next 30 days. See \"Patient Info\" tab in inbasket, or \"Choose Columns\" in Meds & Orders section of the refill encounter.          Passed - Medication is active on med list        Passed - " Patient is age 18 or older        Passed - No active pregnancy on record        Passed - No positive pregnancy test in past 12 months           lisinopril-hydrochlorothiazide (ZESTORETIC) 20-25 MG tablet 90 tablet 3     Sig: TAKE 1 TABLET EVERY DAY       Diuretics (Including Combos) Protocol Passed - 4/3/2020  4:12 PM      ACE Inhibitors (Including Combos) Protocol Passed - 4/3/2020  4:12 PM      PPI Protocol Passed - 4/3/2020  4:12 PM        Passed - Not on Clopidogrel (unless Pantoprazole ordered)       rosuvastatin (CRESTOR) 5 MG tablet 90 tablet 2     Sig: TAKE 1 TABLET EVERY DAY       Statins Protocol Passed - 4/3/2020  4:12 PM       verapamil ER (CALAN-SR) 180 MG CR tablet 180 tablet 1     Sig: Take 1 tablet (180 mg) by mouth 2 times daily       Calcium Channel Blockers Protocol  Passed - 4/3/2020  4:12 PM

## 2020-04-03 NOTE — TELEPHONE ENCOUNTER
Patient states she needs new prescriptions for all of her medications sent [email or web address] mail service for her new insurance.    Thank you.

## 2020-04-06 ENCOUNTER — TELEPHONE (OUTPATIENT)
Dept: FAMILY MEDICINE | Facility: CLINIC | Age: 80
End: 2020-04-06

## 2020-04-06 DIAGNOSIS — I10 HYPERTENSION GOAL BP (BLOOD PRESSURE) < 150/90: Chronic | ICD-10-CM

## 2020-04-06 RX ORDER — CLOPIDOGREL BISULFATE 75 MG/1
TABLET ORAL
Qty: 90 TABLET | Refills: 1 | Status: SHIPPED | OUTPATIENT
Start: 2020-04-06 | End: 2020-09-03

## 2020-04-06 RX ORDER — ROSUVASTATIN CALCIUM 5 MG/1
TABLET, COATED ORAL
Qty: 90 TABLET | Refills: 2 | Status: SHIPPED | OUTPATIENT
Start: 2020-04-06 | End: 2020-09-03

## 2020-04-06 RX ORDER — VERAPAMIL HYDROCHLORIDE 180 MG/1
180 TABLET, EXTENDED RELEASE ORAL 2 TIMES DAILY
Qty: 180 TABLET | Refills: 1 | Status: SHIPPED | OUTPATIENT
Start: 2020-04-06 | End: 2020-09-03

## 2020-04-06 RX ORDER — POTASSIUM CHLORIDE 1500 MG/1
TABLET, EXTENDED RELEASE ORAL
Qty: 90 TABLET | Refills: 1 | Status: SHIPPED | OUTPATIENT
Start: 2020-04-06 | End: 2020-09-03

## 2020-04-06 RX ORDER — PANTOPRAZOLE SODIUM 40 MG/1
40 TABLET, DELAYED RELEASE ORAL DAILY
Qty: 90 TABLET | Refills: 1 | Status: SHIPPED | OUTPATIENT
Start: 2020-04-06 | End: 2020-09-03

## 2020-04-06 RX ORDER — LISINOPRIL AND HYDROCHLOROTHIAZIDE 20; 25 MG/1; MG/1
TABLET ORAL
Qty: 90 TABLET | Refills: 3 | Status: SHIPPED | OUTPATIENT
Start: 2020-04-06 | End: 2020-09-03

## 2020-04-06 RX ORDER — CITALOPRAM HYDROBROMIDE 20 MG/1
20 TABLET ORAL DAILY
Qty: 90 TABLET | Refills: 0 | Status: SHIPPED | OUTPATIENT
Start: 2020-04-06 | End: 2020-09-03

## 2020-04-06 NOTE — TELEPHONE ENCOUNTER
Medical clarification request.    Pharmacy message:   Our records indicate the patient was previously on KLOR-CON M20 ER TAB(crystals/particles). Your new prescription indicates a change to KLOR-Con ER TAB 20MEQ (wax matrix). Please verify if DOSAGE FORM change is intended.

## 2020-04-07 NOTE — TELEPHONE ENCOUNTER
Returned call to [facility].     This Rx is new for this pharmacy as they have never filled it for this patient previously. There is an error in the way this medication was put into the E-prescribe national system per pharmacist which is why they are needing more specific information than normal.      Apparently there are 2 forms of potassium ER.  Breakable and non-breakable.   The generic for K tabs are non-breakable and the Klor-con M20 meq are breakable.      Pharmacy is just needing to know if provider wants the breakable tablet or non-breakable tablet.    Routing to provider to advise.  Joanne Kirby BSN, RN

## 2020-05-04 ENCOUNTER — ANCILLARY PROCEDURE (OUTPATIENT)
Dept: GENERAL RADIOLOGY | Facility: CLINIC | Age: 80
End: 2020-05-04
Attending: NURSE PRACTITIONER
Payer: COMMERCIAL

## 2020-05-04 ENCOUNTER — OFFICE VISIT (OUTPATIENT)
Dept: URGENT CARE | Facility: URGENT CARE | Age: 80
End: 2020-05-04
Payer: COMMERCIAL

## 2020-05-04 VITALS
HEART RATE: 85 BPM | DIASTOLIC BLOOD PRESSURE: 64 MMHG | SYSTOLIC BLOOD PRESSURE: 126 MMHG | OXYGEN SATURATION: 98 % | TEMPERATURE: 98.7 F

## 2020-05-04 DIAGNOSIS — R94.31 ABNORMAL ELECTROCARDIOGRAM: ICD-10-CM

## 2020-05-04 DIAGNOSIS — N30.00 ACUTE CYSTITIS WITHOUT HEMATURIA: Primary | ICD-10-CM

## 2020-05-04 DIAGNOSIS — R11.0 NAUSEA: ICD-10-CM

## 2020-05-04 LAB
ALBUMIN UR-MCNC: NEGATIVE MG/DL
APPEARANCE UR: CLEAR
BACTERIA #/AREA URNS HPF: ABNORMAL /HPF
BASOPHILS # BLD AUTO: 0 10E9/L (ref 0–0.2)
BASOPHILS NFR BLD AUTO: 0.1 %
BILIRUB UR QL STRIP: NEGATIVE
COLOR UR AUTO: YELLOW
DIFFERENTIAL METHOD BLD: NORMAL
EOSINOPHIL # BLD AUTO: 0.2 10E9/L (ref 0–0.7)
EOSINOPHIL NFR BLD AUTO: 2.9 %
ERYTHROCYTE [DISTWIDTH] IN BLOOD BY AUTOMATED COUNT: 13.4 % (ref 10–15)
GLUCOSE UR STRIP-MCNC: NEGATIVE MG/DL
HCT VFR BLD AUTO: 42.5 % (ref 35–47)
HGB BLD-MCNC: 14.1 G/DL (ref 11.7–15.7)
HGB UR QL STRIP: ABNORMAL
HYALINE CASTS #/AREA URNS LPF: ABNORMAL /LPF
KETONES UR STRIP-MCNC: NEGATIVE MG/DL
LEUKOCYTE ESTERASE UR QL STRIP: ABNORMAL
LYMPHOCYTES # BLD AUTO: 2.8 10E9/L (ref 0.8–5.3)
LYMPHOCYTES NFR BLD AUTO: 37.6 %
MCH RBC QN AUTO: 29.8 PG (ref 26.5–33)
MCHC RBC AUTO-ENTMCNC: 33.2 G/DL (ref 31.5–36.5)
MCV RBC AUTO: 90 FL (ref 78–100)
MONOCYTES # BLD AUTO: 0.8 10E9/L (ref 0–1.3)
MONOCYTES NFR BLD AUTO: 10.4 %
NEUTROPHILS # BLD AUTO: 3.7 10E9/L (ref 1.6–8.3)
NEUTROPHILS NFR BLD AUTO: 49 %
NITRATE UR QL: NEGATIVE
NON-SQ EPI CELLS #/AREA URNS LPF: ABNORMAL /LPF
PH UR STRIP: 5.5 PH (ref 5–7)
PLATELET # BLD AUTO: 271 10E9/L (ref 150–450)
RBC # BLD AUTO: 4.73 10E12/L (ref 3.8–5.2)
RBC #/AREA URNS AUTO: ABNORMAL /HPF
SOURCE: ABNORMAL
SP GR UR STRIP: >1.03 (ref 1–1.03)
UROBILINOGEN UR STRIP-ACNC: 0.2 EU/DL (ref 0.2–1)
WBC # BLD AUTO: 7.6 10E9/L (ref 4–11)
WBC #/AREA URNS AUTO: ABNORMAL /HPF

## 2020-05-04 PROCEDURE — 81001 URINALYSIS AUTO W/SCOPE: CPT | Performed by: NURSE PRACTITIONER

## 2020-05-04 PROCEDURE — 99215 OFFICE O/P EST HI 40 MIN: CPT | Performed by: NURSE PRACTITIONER

## 2020-05-04 PROCEDURE — 87086 URINE CULTURE/COLONY COUNT: CPT | Performed by: NURSE PRACTITIONER

## 2020-05-04 PROCEDURE — 74019 RADEX ABDOMEN 2 VIEWS: CPT

## 2020-05-04 PROCEDURE — 36415 COLL VENOUS BLD VENIPUNCTURE: CPT | Performed by: NURSE PRACTITIONER

## 2020-05-04 PROCEDURE — 85025 COMPLETE CBC W/AUTO DIFF WBC: CPT | Performed by: NURSE PRACTITIONER

## 2020-05-04 PROCEDURE — 83690 ASSAY OF LIPASE: CPT | Performed by: NURSE PRACTITIONER

## 2020-05-04 PROCEDURE — 80053 COMPREHEN METABOLIC PANEL: CPT | Performed by: NURSE PRACTITIONER

## 2020-05-04 PROCEDURE — 93000 ELECTROCARDIOGRAM COMPLETE: CPT | Performed by: NURSE PRACTITIONER

## 2020-05-04 RX ORDER — SULFAMETHOXAZOLE/TRIMETHOPRIM 800-160 MG
1 TABLET ORAL 2 TIMES DAILY
Qty: 6 TABLET | Refills: 0 | Status: SHIPPED | OUTPATIENT
Start: 2020-05-04 | End: 2020-05-07

## 2020-05-04 NOTE — PROGRESS NOTES
SUBJECTIVE  HPI:  Swati Marinelli is a 79 year old female who presents with the CC of nausea  Started this morning.   Denies any abdominal pain, fever, chills, urinary symptoms, diarrhea, vomiting, dizziness, weakness, slurred speech, cold symptoms chest pain sob  Eating is decreased slightly, adequate intake and output urinary. Feels some fatigue. Possibly more constipated    Past Medical History:   Diagnosis Date     GERD (gastroesophageal reflux disease)      Hypertension goal BP (blood pressure) < 150/90 11/14/2017     Mixed hyperlipidemia 11/14/2017     MANNY (obstructive sleep apnea)- 'moderate' (AHI 19) 11/14/2017    Initial diagnosis ?[facility], [place].  Study Date: 8/17/2018- (154.0 lbs) It was frequently difficult to discern between RERAs and PLMs. The combined apnea/hypopnea index was 19.9 events per hour (central apnea/hypopnea index was 0.5 events per hour). The REM AHI was 74.4 events per hour. The supine AHI was n/a.  RDI was 49.0 events per hour. Baseline oxygen saturation was 91.6%. Lowest oxygen saturatio     TIA (transient ischemic attack) 2010    Possible TIA approximately 2010. Occular symptoms in left eye, likely occipital      Current Outpatient Medications   Medication Sig Dispense Refill     citalopram (CELEXA) 20 MG tablet Take 1 tablet (20 mg) by mouth daily 90 tablet 0     clopidogrel (PLAVIX) 75 MG tablet TAKE 1 TABLET (75 MG) BY MOUTH DAILY 90 tablet 1     lisinopril-hydrochlorothiazide (ZESTORETIC) 20-25 MG tablet TAKE 1 TABLET EVERY DAY 90 tablet 3     loratadine (CLARITIN) 10 MG tablet Take 1 tablet (10 mg) by mouth daily 90 tablet 1     meclizine (ANTIVERT) 25 MG tablet Take 1 tablet (25 mg) by mouth every 6 hours as needed for dizziness 30 tablet 1     order for DME autoCPAP 7-13 cmH20 1 Device 0     pantoprazole (PROTONIX) 40 MG EC tablet Take 1 tablet (40 mg) by mouth daily 90 tablet 1     potassium chloride ER (KLOR-CON) 20 MEQ CR tablet TAKE 1 TABLET EVERY DAY 90 tablet 1     rosuvastatin  (CRESTOR) 5 MG tablet TAKE 1 TABLET EVERY DAY 90 tablet 2     verapamil ER (CALAN-SR) 180 MG CR tablet Take 1 tablet (180 mg) by mouth 2 times daily 180 tablet 1     Social History     Tobacco Use     Smoking status: Never Smoker     Smokeless tobacco: Never Used   Substance Use Topics     Alcohol use: No       ROS:  CONSTITUTIONAL:NEGATIVE for fever, chills, change in weight  INTEGUMENTARY/SKIN: NEGATIVE for worrisome rashes, moles or lesions  EYES: NEGATIVE for vision changes or irritation  ENT/MOUTH: NEGATIVE for ear, mouth and throat problems  RESP:NEGATIVE for significant cough or SOB  CV: NEGATIVE for chest pain, palpitations or peripheral edema  GI: POSITIVE for nausea  MUSCULOSKELETAL: NEGATIVE for significant arthralgias or myalgia  NEURO: NEGATIVE for weakness, dizziness or paresthesias  ENDOCRINE: NEGATIVE for temperature intolerance, skin/hair changes  HEME/ALLERGY/IMMUNE: NEGATIVE for bleeding problems  PSYCHIATRIC: NEGATIVE for changes in mood or affect    OBJECTIVE:  /64   Pulse 85   Temp 98.7  F (37.1  C) (Tympanic)   SpO2 98%   GENERAL APPEARANCE: alert and no distress  EYES: EOMI,  PERRL, conjunctiva clear  HENT: ear canals and TM's normal.  Nose and mouth without ulcers, erythema or lesions  NECK: supple, nontender, no lymphadenopathy  RESP: lungs clear to auscultation - no rales, rhonchi or wheezes  CV: regular rates and rhythm, normal S1 S2, no murmur noted  ABDOMEN:  soft, nontender, no HSM or masses and bowel sounds normal  Extremities: no peripheral edema or tenderness, peripheral pulses normal  NEURO: Normal strength and tone, sensory exam grossly normal,  normal speech and mentation  SKIN: no suspicious lesions or rashes  LYMPHATICS: no cervical adenopathy    Xray abdomen: negative, pending radiology read  EKG  -Left atrial enlargement.  -  Negative T-waves  -May be normal -possible  Anteroseptal/anterior ischemia.     LABS:  Component      Latest Ref Rng & Units 5/4/2020   WBC       4.0 - 11.0 10e9/L 7.6   RBC Count      3.8 - 5.2 10e12/L 4.73   Hemoglobin      11.7 - 15.7 g/dL 14.1   Hematocrit      35.0 - 47.0 % 42.5   MCV      78 - 100 fl 90   MCH      26.5 - 33.0 pg 29.8   MCHC      31.5 - 36.5 g/dL 33.2   RDW      10.0 - 15.0 % 13.4   Platelet Count      150 - 450 10e9/L 271   % Neutrophils      % 49.0   % Lymphocytes      % 37.6   % Monocytes      % 10.4   % Eosinophils      % 2.9   % Basophils      % 0.1   Absolute Neutrophil      1.6 - 8.3 10e9/L 3.7   Absolute Lymphocytes      0.8 - 5.3 10e9/L 2.8   Absolute Monocytes      0.0 - 1.3 10e9/L 0.8   Absolute Eosinophils      0.0 - 0.7 10e9/L 0.2   Absolute Basophils      0.0 - 0.2 10e9/L 0.0   Diff Method       Automated Method   Color Urine       Yellow   Appearance Urine       Clear   Glucose Urine      NEG:Negative mg/dL Negative   Bilirubin Urine      NEG:Negative Negative   Ketones Urine      NEG:Negative mg/dL Negative   Specific Gravity Urine      1.003 - 1.035 >1.030   Blood Urine      NEG:Negative Trace (A)   pH Urine      5.0 - 7.0 pH 5.5   Protein Albumin Urine      NEG:Negative mg/dL Negative   Urobilinogen Urine      0.2 - 1.0 EU/dL 0.2   Nitrite Urine      NEG:Negative Negative   Leukocyte Esterase Urine      NEG:Negative Moderate (A)   Source       Midstream Urine   WBC Urine      OTO5:0 - 5 /HPF 25-50 (A)   RBC Urine      OTO2:O - 2 /HPF 2-5 (A)   Hyaline Casts      OTO2:O - 2 /LPF 5-10 (A)   Squamous Epithelial /LPF Urine      FEW:Few /LPF Moderate (A)   Bacteria Urine      NEG:Negative /HPF Moderate (A)     CMP AND LIPASE PENDING    ASSESSMENT:  (N30.00) Acute cystitis without hematuria  (primary encounter diagnosis)  (R11.0) Nausea  (R94.31) Abnormal electrocardiogram    Plan: Treated for UTI with bactrim  EKG abnormal (could be normal for her but no comparison) I advised she go to ER to be checked out for cardiac cause of nausea and fatigue (could be from uti but need a cardiac rule out as well could be two things  going on)  Push fluids. Follow up with pcp as needed    KARLEE Hoang CNP

## 2020-05-04 NOTE — PATIENT INSTRUCTIONS
"  Patient Education     Bladder Infection, Female (Adult)    Urine is normally doesn't have any bacteria in it. But bacteria can get into the urinary tract from the skin around the rectum. Or they can travel in the blood from elsewhere in the body. Once they are in your urinary tract, they can cause infection in the urethra (urethritis), the bladder (cystitis), or the kidneys (pyelonephritis).  The most common place for an infection is in the bladder. This is called a bladder infection. This is one of the most common infections in women. Most bladder infections are easily treated. They are not serious unless the infection spreads to the kidney.  The phrases \"bladder infection,\" \"UTI,\" and \"cystitis\" are often used to describe the same thing. But they are not always the same. Cystitis is an inflammation of the bladder. The most common cause of cystitis is an infection.  Symptoms  The infection causes inflammation in the urethra and bladder. This causes many of the symptoms. The most common symptoms of a bladder infection are:    Pain or burning when urinating    Having to urinate more often than usual    Urgent need to urinate    Only a small amount of urine comes out    Blood in urine    Abdominal discomfort. This is usually in the lower abdomen above the pubic bone.    Cloudy urine    Strong- or bad-smelling urine    Unable to urinate (urinary retention)    Unable to hold urine in (urinary incontinence)    Fever    Loss of appetite    Confusion (in older adults)  Causes  Bladder infections are not contagious. You can't get one from someone else, from a toilet seat, or from sharing a bath.  The most common cause of bladder infections is bacteria from the bowels. The bacteria get onto the skin around the opening of the urethra. From there, they can get into the urine and travel up to the bladder, causing inflammation and infection. This usually happens because of:    Wiping improperly after urinating. Always wipe " from front to back.    Bowel incontinence    Pregnancy    Procedures such as having a catheter inserted    Older age    Not emptying your bladder. This can allow bacteria a chance to grow in your urine.    Dehydration    Constipation    Sex    Use of a diaphragm for birth control   Treatment  Bladder infections are diagnosed by a urine test. They are treated with antibiotics and usually clear up quickly without complications. Treatment helps prevent a more serious kidney infection.  Medicines  Medicines can help in the treatment of a bladder infection:    Take antibiotics until they are used up, even if you feel better. It is important to finish them to make sure the infection has cleared.    You can use acetaminophen or ibuprofen for pain, fever, or discomfort, unless another medicine was prescribed. If you have chronic liver or kidney disease, talk with your healthcare provider before using these medicines. Also talk with your provider if you've ever had a stomach ulcer or gastrointestinal bleeding, or are taking blood-thinner medicines.    If you are given phenazopydridine to reduce burning with urination, it will cause your urine to become a bright orange color. This can stain clothing.  Care and prevention  These self-care steps can help prevent future infections:    Drink plenty of fluids to prevent dehydration and flush out your bladder. Do this unless you must restrict fluids for other health reasons, or your doctor told you not to.    Proper cleaning after going to the bathroom is important. Wipe from front to back after using the toilet to prevent the spread of bacteria.    Urinate more often. Don't try to hold urine in for a long time.    Wear loose-fitting clothes and cotton underwear. Avoid tight-fitting pants.    Improve your diet and prevent constipation. Eat more fresh fruit and vegetables, and fiber, and less junk and fatty foods.    Avoid sex until your symptoms are gone.    Avoid caffeine,  alcohol, and spicy foods. These can irritate your bladder.    Urinate right after intercourse to flush out your bladder.    If you use birth control pills and have frequent bladder infections, discuss it with your doctor.  Follow-up care  Call your healthcare provider if all symptoms are not gone after 3 days of treatment. This is especially important if you have repeat infections.  If a culture was done, you will be told if your treatment needs to be changed. If directed, you can call to find out the results.  If X-rays were done, you will be told if the results will affect your treatment.  Call 911  Call 911 if any of the following occur:    Trouble breathing    Hard to wake up or confusion    Fainting or loss of consciousness    Rapid heart rate  When to seek medical advice  Call your healthcare provider right away if any of these occur:    Fever of 100.4 F (38.0 C) or higher, or as directed by your healthcare provider    Symptoms are not better by the third day of treatment    Back or belly (abdominal) pain that gets worse    Repeated vomiting, or unable to keep medicine down    Weakness or dizziness    Vaginal discharge    Pain, redness, or swelling in the outer vaginal area (labia)  Date Last Reviewed: 10/1/2016    [id number] [employer]. [address] [place] [address], [place] [address]. All rights reserved. This information is not intended as a substitute for professional medical care. Always follow your healthcare professional's instructions.

## 2020-05-05 LAB
ALBUMIN SERPL-MCNC: 4 G/DL (ref 3.4–5)
ALP SERPL-CCNC: 56 U/L (ref 40–150)
ALT SERPL W P-5'-P-CCNC: 29 U/L (ref 0–50)
ANION GAP SERPL CALCULATED.3IONS-SCNC: 6 MMOL/L (ref 3–14)
AST SERPL W P-5'-P-CCNC: 18 U/L (ref 0–45)
BACTERIA SPEC CULT: NORMAL
BILIRUB SERPL-MCNC: 0.3 MG/DL (ref 0.2–1.3)
BUN SERPL-MCNC: 27 MG/DL (ref 7–30)
CALCIUM SERPL-MCNC: 9.4 MG/DL (ref 8.5–10.1)
CHLORIDE SERPL-SCNC: 104 MMOL/L (ref 94–109)
CO2 SERPL-SCNC: 29 MMOL/L (ref 20–32)
CREAT SERPL-MCNC: 1.08 MG/DL (ref 0.52–1.04)
GFR SERPL CREATININE-BSD FRML MDRD: 48 ML/MIN/{1.73_M2}
GLUCOSE SERPL-MCNC: 92 MG/DL (ref 70–99)
LIPASE SERPL-CCNC: 185 U/L (ref 73–393)
POTASSIUM SERPL-SCNC: 3.8 MMOL/L (ref 3.4–5.3)
PROT SERPL-MCNC: 7.6 G/DL (ref 6.8–8.8)
SODIUM SERPL-SCNC: 139 MMOL/L (ref 133–144)
SPECIMEN SOURCE: NORMAL

## 2020-08-18 NOTE — PROGRESS NOTES
SUBJECTIVE:   Swati Marinelli is here in follow up of bilateral hip greater trochanteric bursitis and right hip osteoarthritis.     Last injection(s):   1. The patient had 4-5 previous hip steroid injections receiving them every 3-4 months in [place].  2. Bilateral Hip greater trochanteric bursal steroid injection on 04/05/18, and 8/1/19. Length of effectiveness: 3 months, and 8/23/18. Length of effectiveness: 3-4 months.  Physical therapy ordered-- didn't do that, but does exercise classes.    She has fallen a couple of times and now the right knee has been painful x 4-6 weeks.  Pain to walk in the knee.  Pain diffusely.  No swelling.  No catching, locking or giving-way   Pain constant.  Rest/night pain.  Pain to flex.    Symptoms: pain lateral hip.  NOT groin area.     Review of Systems:  Constitutional/General: Negative for fever, chills, change in weight  Integumentary/Skin: Negative for worrisome rashes, moles, or lesions  Neuro: Negative for weakness, dizziness, or paresthesias   Psychiatric: negative for changes in mood or affect     OBJECTIVE:  Physical Exam:  /73 (BP Location: Right arm, Patient Position: Sitting, Cuff Size: Adult Regular)   Pulse 91   Wt 69 kg (152 lb 3.2 oz)   SpO2 96%   BMI 29.48 kg/m    General Appearance: healthy, alert and no distress   Skin: no suspicious lesions or rashes  Neuro: Normal strength and tone, mentation intact and speech normal  Vascular: good pulses, and capillary refill   Lymph: no lymphadenopathy   Psych:  mentation appears normal and affect normal/bright  Resp: no increased work of breathing     Right hip  Tender greater trochanteric bursa.      Right Hip   ROM: Flexion 110 degrees    ROM: Internal Rotation 10 degrees    ROM: External Rotation 45 degrees    ROM: Abduction 35 degrees   Strength Good flexion and abduction strength      Special tests: Trendelenburg's: negative    Right knee exam:    Gait: walks with normal gait  Alignment: normal   Squat: not tested  .    Patellofemoral joint: mild crepitations in the patellofemoral joint.  Effusion: mild, some pes fullness  ROM: 0-130  Tender: medial joint line, lateral joint line and pes anserine  Masses: none  Ligaments:   Lachman's: stable   Anterior/Posterior drawer: stable,   Varus/Valgus stress: stable to varus and valgus stress  McMurrays: negative     X-rays:  From 08/21/18,  moderate hypertrophic changes in both hips, osteophytes on the acetabulum and femoral head, and significant calcification on the greater trochanters bilaterally.    Xrays right hip today reviewed with the patient today: no changes, with mild osteoarthritis changes and lateral calcifications.    Right knee xray today show normal medial and lateral joint spaces, mild patellofemoral joint arthritis.     ASSESSMENT:   1. Greater trochanteric bursitis, right hip  2. Mild- Moderate OA, bilateral hips not much osteoarthritis pain presently.  3. Right knee pain, mild osteoarthritis patellofemoral joint,   Right pes bursitis.    The patient does say that she has groin pain occasionally in both hips.     PLAN:   We decided to proceed with repeat right greater trochanteric bursal injection.  Discussed the risks of repeat steroid injections to a certain area and I urged the patient to wait more than 6 months between injections, if at all possible.  Procedure Note:   Informed consent obtained. Risks, benefits and complications of the injection were discussed with the patient and the patient elected to proceed. Right hip injected in the greater trochanteric bursa with 80mg of Depomedrol and 2cc of local anesthetic after sterile prep.    Pes bursitis:  Ice.  Can't use voltaren gel due to severe nsaid allergy. Could use topical analgesics.  Consider corticosteroid injection down the road.  Doesn't want to do physical therapy.  Has a bad taste in her mouth with physical therapy.       Return to clinic: as needed.       JAYDEN Heller MD  Dept. Orthopedic  Surgery  [facility]

## 2020-08-18 NOTE — PROGRESS NOTES
SUBJECTIVE:   Swati Marinelli is here in follow up of bilateral hip greater trochanteric bursitis and right hip osteoarthritis.     Last injection(s):   1. The patient had 5-6  previous hip steroid injections receiving them every 3-4 months in [place].She had bilateral injections at our clinic on 8/1/2019.  2. Bilateral Hip greater trochanteric bursal steroid injection on 04/05/18. Length of effectiveness: 3 months, and 8/23/18. Length of effectiveness: 3-4 months, but wanted to wait as long as possible.  Physical therapy ordered-- didn't do that, but does exercise classes.    Review of Systems:  Constitutional/General: Negative for fever, chills, change in weight  Integumentary/Skin: Negative for worrisome rashes, moles, or lesions  Neuro: Negative for weakness, dizziness, or paresthesias   Psychiatric: negative for changes in mood or affect    OBJECTIVE:  Physical Exam:  There were no vitals taken for this visit.  General Appearance: healthy, alert and no distress   Skin: no suspicious lesions or rashes  Neuro: Normal strength and tone, mentation intact and speech normal  Vascular: good pulses, and capillary refill   Lymph: no lymphadenopathy   Psych:  mentation appears normal and affect normal/bright  Resp: no increased work of breathing    {RIGHT LEFT CAPS:468226} Hip Exam:  Tender: {[facility] [place] HIP TENDERNESS:551302}  Hip range of motion:    Flexion: *** degrees, with *** flexion contracture   Internal Rotation: *** degrees   External Rotation: *** degrees   Abduction: *** degrees  Strength: {[facility] [place] HIP STRENGTH:557701}  Leg lengths: {NOT TESTED:193589}  Special tests:  {[facility] [place] HIP SPECIAL TESTS:141829}    ASSESSMENT:   ***    PLAN:   ***    Return to clinic: ***    JAYDEN Heller MD  Dept. Orthopedic Surgery  [facility]     ***

## 2020-08-19 ENCOUNTER — OFFICE VISIT (OUTPATIENT)
Dept: ORTHOPEDICS | Facility: CLINIC | Age: 80
End: 2020-08-19
Payer: COMMERCIAL

## 2020-08-19 ENCOUNTER — ANCILLARY PROCEDURE (OUTPATIENT)
Dept: GENERAL RADIOLOGY | Facility: CLINIC | Age: 80
End: 2020-08-19
Attending: ORTHOPAEDIC SURGERY
Payer: COMMERCIAL

## 2020-08-19 VITALS
BODY MASS INDEX: 28.51 KG/M2 | HEIGHT: 61 IN | HEART RATE: 86 BPM | OXYGEN SATURATION: 96 % | DIASTOLIC BLOOD PRESSURE: 70 MMHG | SYSTOLIC BLOOD PRESSURE: 115 MMHG | WEIGHT: 151 LBS

## 2020-08-19 DIAGNOSIS — M16.0 PRIMARY OSTEOARTHRITIS OF BOTH HIPS: ICD-10-CM

## 2020-08-19 DIAGNOSIS — M70.62 TROCHANTERIC BURSITIS OF BOTH HIPS: ICD-10-CM

## 2020-08-19 DIAGNOSIS — M70.61 TROCHANTERIC BURSITIS OF BOTH HIPS: ICD-10-CM

## 2020-08-19 DIAGNOSIS — M25.561 RIGHT KNEE PAIN, UNSPECIFIED CHRONICITY: ICD-10-CM

## 2020-08-19 DIAGNOSIS — M70.51 PES ANSERINUS BURSITIS OF RIGHT KNEE: ICD-10-CM

## 2020-08-19 DIAGNOSIS — M70.61 GREATER TROCHANTERIC BURSITIS, RIGHT: Primary | ICD-10-CM

## 2020-08-19 PROCEDURE — 73502 X-RAY EXAM HIP UNI 2-3 VIEWS: CPT

## 2020-08-19 PROCEDURE — 20610 DRAIN/INJ JOINT/BURSA W/O US: CPT | Mod: RT | Performed by: ORTHOPAEDIC SURGERY

## 2020-08-19 PROCEDURE — 73562 X-RAY EXAM OF KNEE 3: CPT | Mod: RT

## 2020-08-19 PROCEDURE — 99213 OFFICE O/P EST LOW 20 MIN: CPT | Mod: 25 | Performed by: ORTHOPAEDIC SURGERY

## 2020-08-19 RX ORDER — METHYLPREDNISOLONE ACETATE 80 MG/ML
80 INJECTION, SUSPENSION INTRA-ARTICULAR; INTRALESIONAL; INTRAMUSCULAR; SOFT TISSUE
Status: SHIPPED | OUTPATIENT
Start: 2020-08-19

## 2020-08-19 RX ORDER — LIDOCAINE HYDROCHLORIDE 10 MG/ML
2 INJECTION, SOLUTION EPIDURAL; INFILTRATION; INTRACAUDAL; PERINEURAL
Status: SHIPPED | OUTPATIENT
Start: 2020-08-19

## 2020-08-19 RX ADMIN — LIDOCAINE HYDROCHLORIDE 2 ML: 10 INJECTION, SOLUTION EPIDURAL; INFILTRATION; INTRACAUDAL; PERINEURAL at 16:42

## 2020-08-19 RX ADMIN — METHYLPREDNISOLONE ACETATE 80 MG: 80 INJECTION, SUSPENSION INTRA-ARTICULAR; INTRALESIONAL; INTRAMUSCULAR; SOFT TISSUE at 16:42

## 2020-08-19 ASSESSMENT — MIFFLIN-ST. JEOR: SCORE: 1079.37

## 2020-08-19 NOTE — LETTER
8/19/2020         RE: Swati Marinelli  [address]  [facility] [address]        Dear Colleague,    Thank you for referring your patient, Swati Marinelli, to the [facility]. Please see a copy of my visit note below.       SUBJECTIVE:   Swati Marinelli is here in follow up of bilateral hip greater trochanteric bursitis and right hip osteoarthritis.     Last injection(s):   1. The patient had 4-5 previous hip steroid injections receiving them every 3-4 months in [place].  2. Bilateral Hip greater trochanteric bursal steroid injection on 04/05/18, and 8/1/19. Length of effectiveness: 3 months, and 8/23/18. Length of effectiveness: 3-4 months.  Physical therapy ordered-- didn't do that, but does exercise classes.    She has fallen a couple of times and now the right knee has been painful x 4-6 weeks.  Pain to walk in the knee.  Pain diffusely.  No swelling.  No catching, locking or giving-way   Pain constant.  Rest/night pain.  Pain to flex.    Symptoms: pain lateral hip.  NOT groin area.     Review of Systems:  Constitutional/General: Negative for fever, chills, change in weight  Integumentary/Skin: Negative for worrisome rashes, moles, or lesions  Neuro: Negative for weakness, dizziness, or paresthesias   Psychiatric: negative for changes in mood or affect     OBJECTIVE:  Physical Exam:  /73 (BP Location: Right arm, Patient Position: Sitting, Cuff Size: Adult Regular)   Pulse 91   Wt 69 kg (152 lb 3.2 oz)   SpO2 96%   BMI 29.48 kg/m    General Appearance: healthy, alert and no distress   Skin: no suspicious lesions or rashes  Neuro: Normal strength and tone, mentation intact and speech normal  Vascular: good pulses, and capillary refill   Lymph: no lymphadenopathy   Psych:  mentation appears normal and affect normal/bright  Resp: no increased work of breathing     Right hip  Tender greater trochanteric bursa.      Right Hip   ROM: Flexion 110 degrees    ROM: Internal Rotation 10 degrees    ROM:  External Rotation 45 degrees    ROM: Abduction 35 degrees   Strength Good flexion and abduction strength      Special tests: Trendelenburg's: negative    Right knee exam:    Gait: walks with normal gait  Alignment: normal   Squat: not tested .    Patellofemoral joint: mild crepitations in the patellofemoral joint.  Effusion: mild, some pes fullness  ROM: 0-130  Tender: medial joint line, lateral joint line and pes anserine  Masses: none  Ligaments:   Lachman's: stable   Anterior/Posterior drawer: stable,   Varus/Valgus stress: stable to varus and valgus stress  McMurrays: negative     X-rays:  From 08/21/18,  moderate hypertrophic changes in both hips, osteophytes on the acetabulum and femoral head, and significant calcification on the greater trochanters bilaterally.    Xrays right hip today reviewed with the patient today: no changes, with mild osteoarthritis changes and lateral calcifications.    Right knee xray today show normal medial and lateral joint spaces, mild patellofemoral joint arthritis.     ASSESSMENT:   1. Greater trochanteric bursitis, right hip  2. Mild- Moderate OA, bilateral hips not much osteoarthritis pain presently.  3. Right knee pain, mild osteoarthritis patellofemoral joint,   Right pes bursitis.    The patient does say that she has groin pain occasionally in both hips.     PLAN:   We decided to proceed with repeat right greater trochanteric bursal injection.  Discussed the risks of repeat steroid injections to a certain area and I urged the patient to wait more than 6 months between injections, if at all possible.  Procedure Note:   Informed consent obtained. Risks, benefits and complications of the injection were discussed with the patient and the patient elected to proceed. Right hip injected in the greater trochanteric bursa with 80mg of Depomedrol and 2cc of local anesthetic after sterile prep.    Pes bursitis:  Ice.  Can't use voltaren gel due to severe nsaid allergy. Could use  topical analgesics.  Consider corticosteroid injection down the road.  Doesn't want to do physical therapy.  Has a bad taste in her mouth with physical therapy.       Return to clinic: as needed.       JAYDEN Heller MD  Dept. Orthopedic Surgery  [facility]       Large Joint Injection/Arthocentesis: R greater trochanteric bursa    Date/Time: 8/19/2020 4:42 PM  Performed by: Kota Mae  Authorized by: Castillo Heller MD     Indications:  Pain  Needle Size:  22 G  Guidance: landmark guided    Approach:  Lateral  Location:  Hip      Site:  R greater trochanteric bursa  Medications:  80 mg methylPREDNISolone 80 MG/ML; 2 mL lidocaine (PF) 1 %  Consent Given by:  Patient  Timeout: timeout called immediately prior to procedure    Prep: patient was prepped and draped in usual sterile fashion            Again, thank you for allowing me to participate in the care of your patient.        Sincerely,        Castillo Heller MD

## 2020-09-02 NOTE — PROGRESS NOTES
Subjective     Swati Marinelli is a 80 year old female who presents to clinic today for the following health issues:    ELDA Longoria is a 81 y.o female with a PMH of HTN, H:D, Anxiety, CKD3, MANNY, GERD who presents with multiple concerns.        #1- Bilateral breast pain x 3-4 months.  Reports both outsides of breasts are sore and under breasts feels like you are pushing on a bruise. Last mammogram 11/2019.  She has been drinking more caffeine lately.  States she does not feel any mass or lump.      #2- Right knee pain-- She has seen Ortho for this and had an xray.      #3- Reports pain when pushing on sides of head at temple region x 6 months, comes and goes.  Denies headaches.  Reports at times her temples are sore when not pressing on them.  Has had 2 falls in the past year, last fall 6 months ago.      #4- reports ear wax buildup.  She went to have a hearing test done and was told she has a lot of wax.      HTN-- chronic condition.  Stable.  Denies chest pain, shortness of breath, dizziness.    HLD-- chronic stable, pending labs. Denies myalgias    Anxiety-- reports stable despite Covid.      GERD-- well controlled with Protonix.       Review of Systems   Constitutional: Negative for chills, fatigue and fever.   HENT: Negative for sore throat and trouble swallowing.    Eyes: Negative for visual disturbance.   Respiratory: Negative for cough and shortness of breath.    Cardiovascular: Negative for chest pain, palpitations and peripheral edema.   Gastrointestinal: Negative for abdominal pain.   Musculoskeletal: Positive for arthralgias. Negative for myalgias.   Neurological: Negative for dizziness, weakness, numbness and headaches.   Psychiatric/Behavioral: Negative for sleep disturbance. The patient is not nervous/anxious.             Objective    /65   Pulse 80   Temp 99.4  F (37.4  C) (Oral)   Wt 69.8 kg (153 lb 12.8 oz)   BMI 29.54 kg/m    Body mass index is 29.54 kg/m .  Physical Exam  Vitals signs reviewed.    Constitutional:       Appearance: She is well-developed.   HENT:      Head: Normocephalic.      Comments: No lesions or visual      Right Ear: Tympanic membrane normal. There is impacted cerumen.      Left Ear: Tympanic membrane normal. There is impacted cerumen.      Ears:      Comments: Canals irrigated and cerumen cleared bilaterally. Small piece of cerumen remains in bottom of right canal.   Pt tolerated well.      Nose: Nose normal.      Mouth/Throat:      Mouth: Mucous membranes are moist.      Pharynx: Oropharynx is clear.   Eyes:      Conjunctiva/sclera: Conjunctivae normal.      Pupils: Pupils are equal, round, and reactive to light.   Neck:      Musculoskeletal: Normal range of motion and neck supple.   Cardiovascular:      Rate and Rhythm: Normal rate and regular rhythm.   Pulmonary:      Effort: Pulmonary effort is normal.      Breath sounds: Normal breath sounds.   Chest:      Comments: Declines breast exam  Skin:     General: Skin is warm and dry.   Neurological:      Mental Status: She is alert and oriented to person, place, and time.   Psychiatric:         Mood and Affect: Mood normal.         Behavior: Behavior normal.         Thought Content: Thought content normal.                Assessment & Plan     1. Hypertension goal BP (blood pressure) < 150/90  - stable  - lisinopril-hydrochlorothiazide (ZESTORETIC) 20-25 MG tablet; TAKE 1 TABLET EVERY DAY  Dispense: 90 tablet; Refill: 1  - potassium chloride ER (KLOR-CON) 20 MEQ CR tablet; TAKE 1 TABLET EVERY DAY  Dispense: 90 tablet; Refill: 1  - verapamil ER (CALAN-SR) 180 MG CR tablet; Take 1 tablet (180 mg) by mouth 2 times daily  Dispense: 180 tablet; Refill: 1  - CBC with platelets; Future  - Comprehensive metabolic panel; Future    2. Mixed hyperlipidemia  - stable  - rosuvastatin (CRESTOR) 5 MG tablet; TAKE 1 TABLET EVERY DAY  Dispense: 90 tablet; Refill: 1  - Comprehensive metabolic panel; Future  - Lipid panel reflex to direct LDL Fasting;  "Future    3. Anxiety  - stable  - citalopram (CELEXA) 20 MG tablet; Take 1 tablet (20 mg) by mouth daily  Dispense: 90 tablet; Refill: 1    4. Transient cerebral ischemia, unspecified type  - stable  - clopidogrel (PLAVIX) 75 MG tablet; TAKE 1 TABLET (75 MG) BY MOUTH DAILY  Dispense: 90 tablet; Refill: 1    5. Gastroesophageal reflux disease without esophagitis  - stable  - pantoprazole (PROTONIX) 40 MG EC tablet; Take 1 tablet (40 mg) by mouth daily  Dispense: 90 tablet; Refill: 1    6. Bilateral impacted cerumen  - HC REMOVAL IMPACTED CERUMEN IRRIGATION/LVG UNILAT    7. Breast pain  - discussed repeating mammogram.  She is wanting to back off on caffeine and follow-up with this at next visit in 4 weeks.     8. [place] tenderness  - discussed head CT given falls 6 months ago.  She declines for now.  Recommend Tylenol prn to see if this has any effect on tenderness that comes and goes.     9. Encounter for screening mammogram for breast cancer  - *MA Screening Digital Bilateral; Future       BMI:   Estimated body mass index is 29.54 kg/m  as calculated from the following:    Height as of 8/19/20: 1.537 m (5' 0.5\").    Weight as of this encounter: 69.8 kg (153 lb 12.8 oz).   Weight management plan: Discussed healthy diet and exercise guidelines        Return in about 4 weeks (around 10/1/2020) for Medicare Wellness, Labs, and Breast Pain F/u.    KARLEE Ramires [facility]    "

## 2020-09-03 ENCOUNTER — OFFICE VISIT (OUTPATIENT)
Dept: FAMILY MEDICINE | Facility: CLINIC | Age: 80
End: 2020-09-03
Payer: COMMERCIAL

## 2020-09-03 VITALS
DIASTOLIC BLOOD PRESSURE: 65 MMHG | SYSTOLIC BLOOD PRESSURE: 110 MMHG | BODY MASS INDEX: 29.54 KG/M2 | WEIGHT: 153.8 LBS | HEART RATE: 80 BPM | TEMPERATURE: 99.4 F

## 2020-09-03 DIAGNOSIS — Z12.31 ENCOUNTER FOR SCREENING MAMMOGRAM FOR BREAST CANCER: ICD-10-CM

## 2020-09-03 DIAGNOSIS — K21.9 GASTROESOPHAGEAL REFLUX DISEASE WITHOUT ESOPHAGITIS: Chronic | ICD-10-CM

## 2020-09-03 DIAGNOSIS — F41.9 ANXIETY: ICD-10-CM

## 2020-09-03 DIAGNOSIS — R51.9 TEMPLE TENDERNESS: ICD-10-CM

## 2020-09-03 DIAGNOSIS — Z23 NEED FOR PROPHYLACTIC VACCINATION AND INOCULATION AGAINST INFLUENZA: ICD-10-CM

## 2020-09-03 DIAGNOSIS — G45.9 TRANSIENT CEREBRAL ISCHEMIA, UNSPECIFIED TYPE: ICD-10-CM

## 2020-09-03 DIAGNOSIS — E78.2 MIXED HYPERLIPIDEMIA: Chronic | ICD-10-CM

## 2020-09-03 DIAGNOSIS — I10 HYPERTENSION GOAL BP (BLOOD PRESSURE) < 150/90: Primary | Chronic | ICD-10-CM

## 2020-09-03 DIAGNOSIS — H61.23 BILATERAL IMPACTED CERUMEN: ICD-10-CM

## 2020-09-03 DIAGNOSIS — N64.4 BREAST PAIN: ICD-10-CM

## 2020-09-03 PROCEDURE — G0008 ADMIN INFLUENZA VIRUS VAC: HCPCS | Performed by: NURSE PRACTITIONER

## 2020-09-03 PROCEDURE — 90662 IIV NO PRSV INCREASED AG IM: CPT | Performed by: NURSE PRACTITIONER

## 2020-09-03 PROCEDURE — 99214 OFFICE O/P EST MOD 30 MIN: CPT | Mod: 25 | Performed by: NURSE PRACTITIONER

## 2020-09-03 PROCEDURE — 69209 REMOVE IMPACTED EAR WAX UNI: CPT | Performed by: NURSE PRACTITIONER

## 2020-09-03 RX ORDER — ROSUVASTATIN CALCIUM 5 MG/1
TABLET, COATED ORAL
Qty: 90 TABLET | Refills: 1 | Status: SHIPPED | OUTPATIENT
Start: 2020-09-03 | End: 2021-03-05

## 2020-09-03 RX ORDER — POTASSIUM CHLORIDE 1500 MG/1
TABLET, EXTENDED RELEASE ORAL
Qty: 90 TABLET | Refills: 1 | Status: SHIPPED | OUTPATIENT
Start: 2020-09-03 | End: 2021-03-05

## 2020-09-03 RX ORDER — PANTOPRAZOLE SODIUM 40 MG/1
40 TABLET, DELAYED RELEASE ORAL DAILY
Qty: 90 TABLET | Refills: 1 | Status: SHIPPED | OUTPATIENT
Start: 2020-09-03 | End: 2021-03-05

## 2020-09-03 RX ORDER — LISINOPRIL AND HYDROCHLOROTHIAZIDE 20; 25 MG/1; MG/1
TABLET ORAL
Qty: 90 TABLET | Refills: 1 | Status: SHIPPED | OUTPATIENT
Start: 2020-09-03 | End: 2021-03-05

## 2020-09-03 RX ORDER — CLOPIDOGREL BISULFATE 75 MG/1
TABLET ORAL
Qty: 90 TABLET | Refills: 1 | Status: SHIPPED | OUTPATIENT
Start: 2020-09-03 | End: 2021-03-05

## 2020-09-03 RX ORDER — VERAPAMIL HYDROCHLORIDE 180 MG/1
180 TABLET, EXTENDED RELEASE ORAL 2 TIMES DAILY
Qty: 180 TABLET | Refills: 1 | Status: SHIPPED | OUTPATIENT
Start: 2020-09-03 | End: 2021-03-05

## 2020-09-03 RX ORDER — CITALOPRAM HYDROBROMIDE 20 MG/1
20 TABLET ORAL DAILY
Qty: 90 TABLET | Refills: 1 | Status: SHIPPED | OUTPATIENT
Start: 2020-09-03 | End: 2021-03-05

## 2020-09-03 ASSESSMENT — ENCOUNTER SYMPTOMS
FEVER: 0
SLEEP DISTURBANCE: 0
SORE THROAT: 0
FATIGUE: 0
MYALGIAS: 0
SHORTNESS OF BREATH: 0
WEAKNESS: 0
NERVOUS/ANXIOUS: 0
HEADACHES: 0
TROUBLE SWALLOWING: 0
ABDOMINAL PAIN: 0
COUGH: 0
ARTHRALGIAS: 1
CHILLS: 0
PALPITATIONS: 0
NUMBNESS: 0
DIZZINESS: 0

## 2020-09-03 NOTE — NURSING NOTE
Patient identified using two patient identifiers.  Ear exam showing wax occlusion completed by provider.  Solution: warm water was placed in the bilateral ear(s) via irrigation tool: FOSTER Mendoza

## 2020-10-01 DIAGNOSIS — E78.2 MIXED HYPERLIPIDEMIA: Chronic | ICD-10-CM

## 2020-10-01 DIAGNOSIS — I10 HYPERTENSION GOAL BP (BLOOD PRESSURE) < 150/90: Chronic | ICD-10-CM

## 2020-10-01 LAB
ALBUMIN SERPL-MCNC: 3.9 G/DL (ref 3.4–5)
ALP SERPL-CCNC: 60 U/L (ref 40–150)
ALT SERPL W P-5'-P-CCNC: 30 U/L (ref 0–50)
ANION GAP SERPL CALCULATED.3IONS-SCNC: 5 MMOL/L (ref 3–14)
AST SERPL W P-5'-P-CCNC: 17 U/L (ref 0–45)
BILIRUB SERPL-MCNC: 0.4 MG/DL (ref 0.2–1.3)
BUN SERPL-MCNC: 18 MG/DL (ref 7–30)
CALCIUM SERPL-MCNC: 9.1 MG/DL (ref 8.5–10.1)
CHLORIDE SERPL-SCNC: 103 MMOL/L (ref 94–109)
CHOLEST SERPL-MCNC: 166 MG/DL
CO2 SERPL-SCNC: 32 MMOL/L (ref 20–32)
CREAT SERPL-MCNC: 0.95 MG/DL (ref 0.52–1.04)
ERYTHROCYTE [DISTWIDTH] IN BLOOD BY AUTOMATED COUNT: 13 % (ref 10–15)
GFR SERPL CREATININE-BSD FRML MDRD: 56 ML/MIN/{1.73_M2}
GLUCOSE SERPL-MCNC: 81 MG/DL (ref 70–99)
HCT VFR BLD AUTO: 42.1 % (ref 35–47)
HDLC SERPL-MCNC: 41 MG/DL
HGB BLD-MCNC: 14 G/DL (ref 11.7–15.7)
LDLC SERPL CALC-MCNC: 98 MG/DL
MCH RBC QN AUTO: 30.3 PG (ref 26.5–33)
MCHC RBC AUTO-ENTMCNC: 33.3 G/DL (ref 31.5–36.5)
MCV RBC AUTO: 91 FL (ref 78–100)
NONHDLC SERPL-MCNC: 125 MG/DL
PLATELET # BLD AUTO: 280 10E9/L (ref 150–450)
POTASSIUM SERPL-SCNC: 3.8 MMOL/L (ref 3.4–5.3)
PROT SERPL-MCNC: 7.4 G/DL (ref 6.8–8.8)
RBC # BLD AUTO: 4.62 10E12/L (ref 3.8–5.2)
SODIUM SERPL-SCNC: 140 MMOL/L (ref 133–144)
TRIGL SERPL-MCNC: 134 MG/DL
WBC # BLD AUTO: 6.5 10E9/L (ref 4–11)

## 2020-10-01 PROCEDURE — 80061 LIPID PANEL: CPT | Performed by: NURSE PRACTITIONER

## 2020-10-01 PROCEDURE — 36415 COLL VENOUS BLD VENIPUNCTURE: CPT | Performed by: NURSE PRACTITIONER

## 2020-10-01 PROCEDURE — 80053 COMPREHEN METABOLIC PANEL: CPT | Performed by: NURSE PRACTITIONER

## 2020-10-01 PROCEDURE — 85027 COMPLETE CBC AUTOMATED: CPT | Performed by: NURSE PRACTITIONER

## 2020-10-05 NOTE — PROGRESS NOTES
Subjective     Swati Marinelli is a 80 year old female who presents to clinic today for the following health issues:    HPI         Hypertension Follow-up      Do you check your blood pressure regularly outside of the clinic? Yes     Are you following a low salt diet? Yes    Are your blood pressures ever more than 140 on the top number (systolic) OR more   than 90 on the bottom number (diastolic), for example 140/90? No      How many servings of fruits and vegetables do you eat daily?  2-3    On average, how many sweetened beverages do you drink each day (Examples: soda, juice, sweet tea, etc.  Do NOT count diet or artificially sweetened beverages)?   1    How many days per week do you exercise enough to make your heart beat faster? 3 or less    How many minutes a day do you exercise enough to make your heart beat faster? 30 - 60    How many days per week do you miss taking your medication? 0    {additonal problems for provider to add (Optional):309067}    Review of Systems   {ROS COMP (Optional):455812}      Objective    There were no vitals taken for this visit.  There is no height or weight on file to calculate BMI.  Physical Exam   {Exam List (Optional):878359}    {Diagnostic Test Results (Optional):681503}        {PROVIDER CHARTING PREFERENCE:549409}

## 2020-10-07 ENCOUNTER — OFFICE VISIT (OUTPATIENT)
Dept: FAMILY MEDICINE | Facility: CLINIC | Age: 80
End: 2020-10-07
Payer: COMMERCIAL

## 2020-10-07 VITALS
SYSTOLIC BLOOD PRESSURE: 125 MMHG | TEMPERATURE: 99.2 F | BODY MASS INDEX: 29.39 KG/M2 | WEIGHT: 153 LBS | DIASTOLIC BLOOD PRESSURE: 67 MMHG | HEART RATE: 60 BPM

## 2020-10-07 DIAGNOSIS — Z53.9 ERRONEOUS ENCOUNTER--DISREGARD: Primary | ICD-10-CM

## 2020-10-07 ASSESSMENT — ACTIVITIES OF DAILY LIVING (ADL): CURRENT_FUNCTION: NO ASSISTANCE NEEDED

## 2020-10-07 NOTE — PROGRESS NOTES
"SUBJECTIVE:   Swati Marinelli is a 80 year old female who presents for Preventive Visit.    {Split Bill scripting  The purpose of this visit is to discuss your medical history and prevent health problems before you are sick. You may be responsible for a co-pay, coinsurance, or deductible if your visit today includes services such as checking on a sore throat, having an x-ray or lab test, or treating and evaluating a new or existing condition :863997}  Patient has been advised of split billing requirements and indicates understanding: {Yes and No:551432}   Are you in the first 12 months of your Medicare coverage?  { :878527::\"No\"}    Healthy Habits:    Frequency of exercise:  2-3 days/week    Duration of exercise:  15-30 minutes    Taking medications regularly:  Yes    Barriers to taking medications:  None    Medication side effects:  None    Ability to successfully perform activities of daily living:  No assistance needed    PHQ-2 Total Score:    Do you feel safe in your environment? { :697428}    Have you ever done Advance Care Planning? (For example, a Health Directive, POLST, or a discussion with a medical provider or your loved ones about your wishes): { :311384}    {Hearing Test Done (Optional):949558}  Fall risk  { :330650}  {If any of the above assessments are answered yes, consider ordering appropriate referrals (Optional):484756::\"click delete button to remove this line now\"}  Cognitive Screening { :190718}    {Do you have sleep apnea, excessive snoring or daytime drowsiness? (Optional):361173}    Reviewed and updated as needed this visit by clinical staff   Allergies  Meds              Reviewed and updated as needed this visit by Provider                Social History     Tobacco Use     Smoking status: Never Smoker     Smokeless tobacco: Never Used   Substance Use Topics     Alcohol use: No     {Rooming Staff- Complete this question if Prescreen response is not shown below for today's visit. If you drink " "alcohol do you typically have >3 drinks per day or >7 drinks per week? (Optional):939684}    Alcohol Use 10/30/2019   Prescreen: >3 drinks/day or >7 drinks/week? Not Applicable   {add AUDIT responses (Optional) (A score of 7 for adult men is an indication of hazardous drinking; a score of 8 or more is an indication of an alcohol use disorder.  A score of 7 or more for adult women is an indication of hazardous drinking or an alchohol use disorder):563718}    {Outside tests to abstract? :113697}    {additional problems to add (Optional):110421}    Current providers sharing in care for this patient include: {Rooming staff:  Please update Care Team in Rooming Activity, refresh this note and then delete this statement}  Patient Care Team:  Leeanne Cunha APRN CNP as PCP - General  Leeanne Cunha APRN CNP as Assigned PCP    The following health maintenance items are reviewed in Epic and correct as of today:  Health Maintenance   Topic Date Due     ZOSTER IMMUNIZATION (3 of 3) 11/07/2019     MEDICARE ANNUAL WELLNESS VISIT  10/30/2020     FALL RISK ASSESSMENT  10/30/2020     BMP  04/01/2021     LIPID  10/01/2021     ADVANCE CARE PLANNING  10/30/2024     DTAP/TDAP/TD IMMUNIZATION (2 - Td) 07/12/2028     DEXA  Completed     PHQ-2  Completed     INFLUENZA VACCINE  Completed     Pneumococcal Vaccine: 65+ Years  Completed     Pneumococcal Vaccine: Pediatrics (0 to 5 Years) and At-Risk Patients (6 to 64 Years)  Aged Out     IPV IMMUNIZATION  Aged Out     MENINGITIS IMMUNIZATION  Aged Out     HEPATITIS B IMMUNIZATION  Aged Out     {Chronicprobdata (optional):606720}  {Decision Support (Optional):639284}    Review of Systems  {ROS COMP (Optional):773822}    OBJECTIVE:   /67   Pulse 60   Temp 99.2  F (37.3  C) (Oral)   Wt 69.4 kg (153 lb)   BMI 29.39 kg/m   Estimated body mass index is 29.39 kg/m  as calculated from the following:    Height as of 8/19/20: 1.537 m (5' 0.5\").    Weight as of this encounter: 69.4 kg (153 " "lb).  Physical Exam  {Exam (Optional) :856686}    {Diagnostic Test Results (Optional):827681::\"Diagnostic Test Results:\",\"Labs reviewed in Epic\"}    ASSESSMENT / PLAN:   {Dia Picklist:134100}    Patient has been advised of split billing requirements and indicates understanding: {YES / NO:729979::\"Yes\"}  COUNSELING:  {Medicare Counselin}    Estimated body mass index is 29.39 kg/m  as calculated from the following:    Height as of 20: 1.537 m (5' 0.5\").    Weight as of this encounter: 69.4 kg (153 lb).    {Weight Management Plan (ACO) Complete if BMI is abnormal-  Ages 18-64  BMI >24.9.  Age 65+ with BMI <23 or >30 (Optional):871561}    She reports that she has never smoked. She has never used smokeless tobacco.      Appropriate preventive services were discussed with this patient, including applicable screening as appropriate for cardiovascular disease, diabetes, osteopenia/osteoporosis, and glaucoma.  As appropriate for age/gender, discussed screening for colorectal cancer, prostate cancer, breast cancer, and cervical cancer. Checklist reviewing preventive services available has been given to the patient.    Reviewed patients plan of care and provided an AVS. The {CarePlan:088137} for  meets the Care Plan requirement. This Care Plan has been established and reviewed with the {PATIENT, FAMILY MEMBER, CAREGIVER:629674}.    Counseling Resources:  ATP IV Guidelines  Pooled Cohorts Equation Calculator  Breast Cancer Risk Calculator  Breast Cancer: Medication to Reduce Risk  FRAX Risk Assessment  ICSI Preventive Guidelines  Dietary Guidelines for Americans,   USDA's MyPlate  ASA Prophylaxis  Lung CA Screening    KARLEE Ramires [facility]    Identified Health Risks:  "

## 2020-10-12 ENCOUNTER — TELEPHONE (OUTPATIENT)
Dept: FAMILY MEDICINE | Facility: CLINIC | Age: 80
End: 2020-10-12

## 2020-10-12 NOTE — TELEPHONE ENCOUNTER
Spoke with patient, informed of below message and mailed copy of results to patients home. Na Georges TC/Pt Rep

## 2020-10-12 NOTE — TELEPHONE ENCOUNTER
Reason for Call:  Request for results:    Name of test or procedure: Lab test    Date of test of procedure: 10/01/2020    Location of the test or procedure: [place]    OK to leave the result message on voice mail or with a family member? YES    Phone number Patient can be reached at:  Home number on file [phone] (home)    Additional comments: Would like a printed copy, will .    Call taken on 10/12/2020 at 11:30 AM by Patricia Aguero

## 2020-10-12 NOTE — TELEPHONE ENCOUNTER
Written by Leeanne Cunha APRN CNP on 10/2/2020  6:51 AM    Good morning June,   Attached is your recent lab results, which are stable.  We will discuss these in detail at your upcoming appointment.  Have a good weekend!   KARLEE Ramires CNP

## 2020-10-12 NOTE — LETTER
[facility]  [address] [facility] [address] [phone]        October 12, 2020 June Rich  [address]   [facility] [address]            Dear June,    The results of your recent tests were normal.  Included is a copy of the results.  It was a pleasure to see you at your last appointment.    If you have any questions or concerns, please call myself or my nurse at [phone].        Sincerely,    Leeanne Cunha NP/sp

## 2020-10-25 ENCOUNTER — NURSE TRIAGE (OUTPATIENT)
Dept: NURSING | Facility: CLINIC | Age: 80
End: 2020-10-25

## 2020-10-25 NOTE — TELEPHONE ENCOUNTER
Called to request for COVID-19 infection testing.  See initial assessment below.  Enma Haddad RN.  COVID 19 Nurse Triage Plan/Patient Instructions    Please be aware that novel coronavirus (COVID-19) may be circulating in the community. If you develop symptoms such as fever, cough, or SOB or if you have concerns about the presence of another infection including coronavirus (COVID-19), please contact your health care provider or visit [email or web address].     Disposition/Instructions    Virtual Visit with provider recommended. Reference Visit Selection Guide.    Thank you for taking steps to prevent the spread of this virus.  o Limit your contact with others.  o Wear a simple mask to cover your cough.  o Wash your hands well and often.    Resources     [facility]: About COVID-19: [email or web address]    CDC: What to Do If You're Sick: www.cdc.gov/coronavirus/2019-ncov/about/steps-when-sick.html    CDC: Ending Home Isolation: www.cdc.gov/coronavirus/2019-ncov/hcp/disposition-in-home-patients.html     CDC: Caring for Someone: www.cdc.gov/coronavirus/2019-ncov/if-you-are-sick/care-for-someone.html     [facility]: Interim Guidance for Hospital Discharge to Home: www.[email or web address].[email or web address]/diseases/coronavirus/hcp/hospdischarge.pdf    [facility] clinical trials (COVID-19 research studies): clinicalaffairs.[facility]-clinical-trials     Below are the COVID-19 hotlines at the [facility] ([facility]). Interpreters are available.   o For health questions: Call 856-505-0594 or 1-634.908.7491 (7 a.m. to 7 p.m.)  o For questions about schools and childcare: Call 212-531-2723 or 1-644.218.1701 (7 a.m. to 7 p.m.)                     Reason for Disposition    [1] COVID-19 EXPOSURE (Close Contact) AND [2] within last 14 days BUT [3] NO symptoms    Additional Information    Negative: COVID-19 has been diagnosed by a healthcare provider (HCP)    Negative: COVID-19 lab test positive    Negative: [1]  "Symptoms of COVID-19 (e.g., cough, fever, SOB, or others) AND [2] lives in an area with community spread    Negative: [1] Symptoms of COVID-19 (e.g., cough, fever, SOB, or others) AND [2] within 14 days of EXPOSURE (close contact) with diagnosed or suspected COVID-19 patient    Negative: [1] Symptoms of COVID-19 (e.g., cough, fever, SOB, or others) AND [2] within 14 days of travel from high-risk area for COVID-19 community spread (identified by CDC)    Negative: [1] Difficulty breathing (shortness of breath) occurs AND [2] onset > 14 days after COVID-19 EXPOSURE (Close Contact) AND [3] no community spread where patient lives    Negative: [1] Dry cough occurs AND [2] onset > 14 days after COVID-19 EXPOSURE AND [3] no community spread where patient lives    Negative: [1] Wet cough (i.e., white-yellow, yellow, green, or russel colored sputum) AND [2] onset > 14 days after COVID-19 EXPOSURE AND [3] no community spread where patient lives    Negative: [1] Common cold symptoms AND [2] onset > 14 days after COVID-19 EXPOSURE AND [3] no community spread where patient lives    Negative: [1] COVID-19 EXPOSURE (Close Contact) within last 14 days AND [2] needs COVID-19 lab test to return to work AND [3] NO symptoms    Negative: [1] COVID-19 EXPOSURE (Close Contact) within last 14 days AND [2] exposed 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 CLOSE CONTACT: \"Who is the person with the confirmed or suspected COVID-19 infection that you were exposed to?\"      Great granddaughter  2. PLACE of CONTACT: \"Where were you when you were exposed to COVID-19?\" (e.g., home, school, medical waiting room; which city?)      Home  3. TYPE of CONTACT: \"How much contact was there?\" (e.g., sitting next to, live in same house, work in same office, same " "building)      Same house  4. DURATION of CONTACT: \"How long were you in contact with the COVID-19 patient?\" (e.g., a few seconds, passed by person, a few minutes, live with the patient)      4-5 hours  5. DATE of CONTACT: \"When did you have contact with a COVID-19 patient?\" (e.g., how many days ago)      10/20 (Tuesday)  6. TRAVEL: \"Have you traveled out of the country recently?\" If so, \"When and where      * Also ask about out-of-state travel, since the [facility] has identified some high-risk cities for community spread in the .      * Note: Travel becomes less relevant if there is widespread community transmission where the patient lives.      No  7. COMMUNITY SPREAD: \"Are there lots of cases of COVID-19 (community spread) where you live?\" (See public health department website, if unsure)        Yes  8. SYMPTOMS: \"Do you have any symptoms?\" (e.g., fever, cough, breathing difficulty)      Runny nose-per caller she gets runny nose at this time of the year  9. PREGNANCY OR POSTPARTUM: \"Is there any chance you are pregnant?\" \"When was your last menstrual period?\" \"Did you deliver in the last 2 weeks?\"      No  10. HIGH RISK: \"Do you have any heart or lung problems? Do you have a weak immune system?\" (e.g., CHF, COPD, asthma, HIV positive, chemotherapy, renal failure, diabetes mellitus, sickle cell anemia)        No    Protocols used: CORONAVIRUS (COVID-19) EXPOSURE-A- 8.4.20      "

## 2020-10-26 ENCOUNTER — VIRTUAL VISIT (OUTPATIENT)
Dept: FAMILY MEDICINE | Facility: CLINIC | Age: 80
End: 2020-10-26
Payer: COMMERCIAL

## 2020-10-26 DIAGNOSIS — Z20.822 EXPOSURE TO COVID-19 VIRUS: Primary | ICD-10-CM

## 2020-10-26 PROCEDURE — 99213 OFFICE O/P EST LOW 20 MIN: CPT | Mod: 95 | Performed by: NURSE PRACTITIONER

## 2020-10-26 NOTE — PROGRESS NOTES
"Swati Marinelli is a 80 year old female who is being evaluated via a billable telephone visit.      The patient has been notified of following:     \"This telephone visit will be conducted via a call between you and your physician/provider. We have found that certain health care needs can be provided without the need for a physical exam.  This service lets us provide the care you need with a short phone conversation.  If a prescription is necessary we can send it directly to your pharmacy.  If lab work is needed we can place an order for that and you can then stop by our lab to have the test done at a later time.    Telephone visits are billed at different rates depending on your insurance coverage. During this emergency period, for some insurers they may be billed the same as an in-person visit.  Please reach out to your insurance provider with any questions.    If during the course of the call the physician/provider feels a telephone visit is not appropriate, you will not be charged for this service.\"    Patient has given verbal consent for Telephone visit?  Yes  [phone]  What phone number would you like to be contacted at? [phone]    How would you like to obtain your AVS? Sebastien    Subjective     Swati Marinelli is a 80 year old female who presents via phone visit today for the following health issues:    HPI       Patient reports 7 days ago she was exposed to her great-granddaughter who was later diagnosed with COVID-19.  Great-granddaughter had been at patient's house for dinner and patient was within 6 feet for > 15 minutes.  Patient currently denies any symptoms.         Review of Systems   Constitutional, HEENT, cardiovascular, pulmonary, gi and gu systems are negative, except as otherwise noted.       Objective          Vitals:  No vitals were obtained today due to virtual visit.    PSYCH: Alert and oriented times 3; coherent speech, normal   rate and volume, able to articulate logical thoughts, able   to " abstract reason, no tangential thoughts, no hallucinations   or delusions  Her affect is normal  RESP: No cough, no audible wheezing, able to talk in full sentences  Remainder of exam unable to be completed due to telephone visits            Assessment/Plan:    Assessment & Plan     Exposure to COVID-19 virus  Patient will be set up with PCR testing.  Advised CDC recommends self-isolation for 14 days after last contact with person who was positive for infection, even if her test is negative.    Patient reports understanding.   - Asymptomatic COVID-19 Virus (Coronavirus) by PCR; Future        See Patient Instructions  Patient Instructions     Instructions for Patients  It is recommended that you have a test for coronavirus (COVID-19). This illness can cause fever, cough and trouble breathing. Many people get a mild case and get better on their own. Some people can get very sick.     Please follow these steps:    1. We will call to schedule your test.  2. A member of our care team will ask you some questions. Then, they will use a swab to collect samples from your nose and throat.     Our testing team will send you your test results.    How can I protect others?    Stay home and away from others (self-isolate) until:    You ve had no fever--and no medicine that reduces fever--for 1 full day (24 hours). And      Your other symptoms have resolved (gotten better). For example, your cough or breathing has improved. And     At least 10 days have passed since your symptoms started.    Stay at least 6 feet away from others. (If someone will drive you to your test, stay in the backseat, as far away from the  as you can.)     Don t go to work, school or anywhere else. When it s time for your test, go straight to the testing site. Don t make any stops on the way there or back.     Wash your hands and face often. Use soap and water.     Cover your mouth and nose with a mask, tissue or washcloth.     Don t touch anyone. No  hugging, kissing or handshakes.    How can I take care of myself?    1. Get lots of rest. Drink extra fluids (unless a doctor has told you not to).     2. Take Tylenol (acetaminophen) for fever or pain. If you have liver or kidney problems, ask your family doctor if it's okay to take Tylenol.     Adults can take either:     650 mg (two 325 mg pills) every 4 to 6 hours, or     1,000 mg (two 500 mg pills) every 8 hours as needed.     Note: Don't take more than 3,000 mg in one day.   Acetaminophen is found in many medicines (both prescribed and over-the-counter medicines). Read all labels to be sure you don't take too much.   For children, check the Tylenol bottle for the right dose. The dose is based on  the child's age or weight.    3. If you have other health problems (like cancer, heart failure, an organ transplant or severe kidney disease): Call your specialty clinic if you don't feel better in the next 2 days.    4. Know when to call 911: If your breathing is so bad that it keeps you from doing normal activities, call 911 or go to the emergency room. Tell them that you've been staying home and may have COVID-19.      Thank you for limiting contact with others, wearing a simple mask to cover your cough, practice good hand hygiene habits and accessing our virtual services where possible to limit the spread of this virus.    For more information about COVID19 and options for caring for yourself at home, please visit the CDC website at https://www.cdc.gov/coronavirus/2019-ncov/about/steps-when-sick.html  For more options for care at [facility], please visit our website at [email or web address]       Return in about 2 weeks (around 11/9/2020) for If failure to improve.    Claudia Dasilva, CNP  [facility]    Phone call duration:  5 minutes

## 2020-10-26 NOTE — PATIENT INSTRUCTIONS
Instructions for Patients  It is recommended that you have a test for coronavirus (COVID-19). This illness can cause fever, cough and trouble breathing. Many people get a mild case and get better on their own. Some people can get very sick.     Please follow these steps:    1. We will call to schedule your test.  2. A member of our care team will ask you some questions. Then, they will use a swab to collect samples from your nose and throat.     Our testing team will send you your test results.    How can I protect others?    Stay home and away from others (self-isolate) until:    You ve had no fever--and no medicine that reduces fever--for 1 full day (24 hours). And      Your other symptoms have resolved (gotten better). For example, your cough or breathing has improved. And     At least 10 days have passed since your symptoms started.    Stay at least 6 feet away from others. (If someone will drive you to your test, stay in the backseat, as far away from the  as you can.)     Don t go to work, school or anywhere else. When it s time for your test, go straight to the testing site. Don t make any stops on the way there or back.     Wash your hands and face often. Use soap and water.     Cover your mouth and nose with a mask, tissue or washcloth.     Don t touch anyone. No hugging, kissing or handshakes.    How can I take care of myself?    1. Get lots of rest. Drink extra fluids (unless a doctor has told you not to).     2. Take Tylenol (acetaminophen) for fever or pain. If you have liver or kidney problems, ask your family doctor if it's okay to take Tylenol.     Adults can take either:     650 mg (two 325 mg pills) every 4 to 6 hours, or     1,000 mg (two 500 mg pills) every 8 hours as needed.     Note: Don't take more than 3,000 mg in one day.   Acetaminophen is found in many medicines (both prescribed and over-the-counter medicines). Read all labels to be sure you don't take too much.   For children,  check the Tylenol bottle for the right dose. The dose is based on  the child's age or weight.    3. If you have other health problems (like cancer, heart failure, an organ transplant or severe kidney disease): Call your specialty clinic if you don't feel better in the next 2 days.    4. Know when to call 911: If your breathing is so bad that it keeps you from doing normal activities, call 911 or go to the emergency room. Tell them that you've been staying home and may have COVID-19.      Thank you for limiting contact with others, wearing a simple mask to cover your cough, practice good hand hygiene habits and accessing our virtual services where possible to limit the spread of this virus.    For more information about COVID19 and options for caring for yourself at home, please visit the CDC website at https://www.cdc.gov/coronavirus/2019-ncov/about/steps-when-sick.html  For more options for care at [facility], please visit our website at [email or web address]

## 2020-11-18 ENCOUNTER — OFFICE VISIT (OUTPATIENT)
Dept: FAMILY MEDICINE | Facility: CLINIC | Age: 80
End: 2020-11-18
Payer: COMMERCIAL

## 2020-11-18 VITALS
BODY MASS INDEX: 30.43 KG/M2 | HEIGHT: 60 IN | WEIGHT: 155 LBS | DIASTOLIC BLOOD PRESSURE: 59 MMHG | TEMPERATURE: 98.2 F | HEART RATE: 70 BPM | SYSTOLIC BLOOD PRESSURE: 106 MMHG

## 2020-11-18 DIAGNOSIS — Z00.00 ENCOUNTER FOR MEDICARE ANNUAL WELLNESS EXAM: Primary | ICD-10-CM

## 2020-11-18 DIAGNOSIS — R42 VERTIGO: ICD-10-CM

## 2020-11-18 PROCEDURE — 99397 PER PM REEVAL EST PAT 65+ YR: CPT | Performed by: NURSE PRACTITIONER

## 2020-11-18 PROCEDURE — 99213 OFFICE O/P EST LOW 20 MIN: CPT | Mod: 25 | Performed by: NURSE PRACTITIONER

## 2020-11-18 RX ORDER — MECLIZINE HYDROCHLORIDE 25 MG/1
25 TABLET ORAL 3 TIMES DAILY PRN
Qty: 270 TABLET | Refills: 1 | Status: SHIPPED | OUTPATIENT
Start: 2020-11-18 | End: 2021-10-28

## 2020-11-18 ASSESSMENT — MIFFLIN-ST. JEOR: SCORE: 1089.58

## 2020-11-18 ASSESSMENT — ENCOUNTER SYMPTOMS
FATIGUE: 0
DIZZINESS: 1
ABDOMINAL PAIN: 0
COUGH: 0
ARTHRALGIAS: 1
SHORTNESS OF BREATH: 0
CHILLS: 0
FEVER: 0
DIFFICULTY URINATING: 0

## 2020-11-18 ASSESSMENT — ACTIVITIES OF DAILY LIVING (ADL): CURRENT_FUNCTION: NO ASSISTANCE NEEDED

## 2020-11-18 NOTE — PROGRESS NOTES
"SUBJECTIVE:   Swati Marinelli is a 80 year old female who presents for Preventive Visit.       Patient has been advised of split billing requirements and indicates understanding: Yes   Are you in the first 12 months of your Medicare coverage?  No    Vertigo- has battled off and on the years.  Currently experiencing a bout of Vertigo.  She is attending PT sessions with some relief.  Needing refill of Meclizine.      Healthy Habits:    In general, how would you rate your overall health?  Very good    Frequency of exercise:  None    Duration of exercise:  Less than 15 minutes    Do you usually eat at least 4 servings of fruit and vegetables a day, include whole grains    & fiber and avoid regularly eating high fat or \"junk\" foods?  Yes    Taking medications regularly:  Yes    Barriers to taking medications:  None    Medication side effects:  None    Ability to successfully perform activities of daily living:  No assistance needed    Home Safety:  No safety concerns identified    Hearing Impairment:  No hearing concerns    In the past 6 months, have you been bothered by leaking of urine? Yes    In general, how would you rate your overall mental or emotional health?  Very good      PHQ-2 Total Score:    Additional concerns today:  Yes   Sore under breast all the way around  Do you feel safe in your environment? Yes    Have you ever done Advance Care Planning? (For example, a Health Directive, POLST, or a discussion with a medical provider or your loved ones about your wishes): No, advance care planning information given to patient to review.  Patient plans to discuss their wishes with loved ones or provider.        Fall risk  Fallen 2 or more times in the past year?: Yes  Any fall with injury in the past year?: Yes  Timed Up and Go Test (>13.5 is fall risk; contact physician) : 10      Cognitive Screening   1) Repeat 3 items (Leader, Season, Table)    2) Clock draw: NORMAL  3) 3 item recall: Recalls 3 objects  Results: 3 " items recalled: COGNITIVE IMPAIRMENT LESS LIKELY    Mini-CogTM Copyright STEPHANIE Mcneil. Licensed by the author for use in [facility]; reprinted with permission (saira@[facility]). All rights reserved.      Do you have sleep apnea, excessive snoring or daytime drowsiness?: yes Uses CPAP    Reviewed and updated as needed this visit by clinical staff  Tobacco  Allergies  Meds  Problems  Med Hx  Surg Hx  Fam Hx  Soc Hx          Reviewed and updated as needed this visit by Provider  Tobacco  Allergies  Meds  Problems  Med Hx  Surg Hx  Fam Hx         Social History     Tobacco Use     Smoking status: Never Smoker     Smokeless tobacco: Never Used   Substance Use Topics     Alcohol use: No         Alcohol Use 10/30/2019   Prescreen: >3 drinks/day or >7 drinks/week? Not Applicable         Current providers sharing in care for this patient include:   Patient Care Team:  Leeanne Cunha APRN CNP as PCP - General  Leeanne Cunha APRN CNP as Assigned PCP  Castillo Heller MD as Assigned Musculoskeletal Provider    The following health maintenance items are reviewed in Epic and correct as of today:  Health Maintenance   Topic Date Due     ZOSTER IMMUNIZATION (3 of 3) 11/07/2019     BMP  04/01/2021     LIPID  10/01/2021     MEDICARE ANNUAL WELLNESS VISIT  11/18/2021     FALL RISK ASSESSMENT  11/18/2021     ADVANCE CARE PLANNING  11/18/2025     DTAP/TDAP/TD IMMUNIZATION (2 - Td) 07/12/2028     DEXA  Completed     PHQ-2  Completed     INFLUENZA VACCINE  Completed     Pneumococcal Vaccine: 65+ Years  Completed     Pneumococcal Vaccine: Pediatrics (0 to 5 Years) and At-Risk Patients (6 to 64 Years)  Aged Out     IPV IMMUNIZATION  Aged Out     MENINGITIS IMMUNIZATION  Aged Out     HEPATITIS B IMMUNIZATION  Aged Out     BP Readings from Last 3 Encounters:   11/18/20 106/59   10/07/20 125/67   09/03/20 110/65    Wt Readings from Last 3 Encounters:   11/18/20 70.3 kg (155 lb)   10/07/20 69.4 kg (153 lb)    09/03/20 69.8 kg (153 lb 12.8 oz)                  Patient Active Problem List   Diagnosis     Advanced directives, counseling/discussion     Hypertension goal BP (blood pressure) < 150/90     Neurocardiogenic syncope     Mixed hyperlipidemia     MANNY (obstructive sleep apnea)- 'moderate' (AHI 19)     Nonrheumatic aortic valve insufficiency     Anxiety     Gastroesophageal reflux disease without esophagitis     Sicca syndrome (H)     ANH positive     Primary osteoarthritis involving multiple joints     Malignant neoplasm of skin of face     CKD (chronic kidney disease) stage 3, GFR 30-59 ml/min     Shoulder pain, bilateral     Past Surgical History:   Procedure Laterality Date     APPENDECTOMY OPEN CHILD  1951     C TOTAL KNEE ARTHROPLASTY Left 2011     C TOTAL KNEE ARTHROPLASTY Left 2013    revision     CARPAL TUNNEL RELEASE RT/LT Right 1995 1990s     CATARACT IOL, RT/LT  2009    right 2/4/09, left 2/21/09     CHOLECYSTECTOMY  06/2012     COLONOSCOPY  2012    normal     MOHS MICROGRAPHIC PROCEDURE  2011    back - 2011, lip and back 5/2012     ROTATOR CUFF REPAIR RT/LT Right 2009     ROTATOR CUFF REPAIR RT/LT Left 04/20/2016     TONSILLECTOMY  1945       Social History     Tobacco Use     Smoking status: Never Smoker     Smokeless tobacco: Never Used   Substance Use Topics     Alcohol use: No     Family History   Problem Relation Age of Onset     Tuberculosis Mother      Myocardial Infarction Father      Coronary Artery Disease Father      Myocardial Infarction Brother      Coronary Artery Disease Brother      Heart Surgery Daughter      Diabetes No family hx of      Colon Cancer No family hx of      Breast Cancer No family hx of          Current Outpatient Medications   Medication Sig Dispense Refill     citalopram (CELEXA) 20 MG tablet Take 1 tablet (20 mg) by mouth daily 90 tablet 1     clopidogrel (PLAVIX) 75 MG tablet TAKE 1 TABLET (75 MG) BY MOUTH DAILY 90 tablet 1     lisinopril-hydrochlorothiazide  (ZESTORETIC) 20-25 MG tablet TAKE 1 TABLET EVERY DAY 90 tablet 1     loratadine (CLARITIN) 10 MG tablet Take 1 tablet (10 mg) by mouth daily 90 tablet 1     meclizine (ANTIVERT) 25 MG tablet Take 1 tablet (25 mg) by mouth 3 times daily as needed for dizziness 270 tablet 1     meclizine (ANTIVERT) 25 MG tablet Take 1 tablet (25 mg) by mouth every 6 hours as needed for dizziness 30 tablet 1     order for DME autoCPAP 7-13 cmH20 1 Device 0     pantoprazole (PROTONIX) 40 MG EC tablet Take 1 tablet (40 mg) by mouth daily 90 tablet 1     potassium chloride ER (KLOR-CON) 20 MEQ CR tablet TAKE 1 TABLET EVERY DAY 90 tablet 1     rosuvastatin (CRESTOR) 5 MG tablet TAKE 1 TABLET EVERY DAY 90 tablet 1     verapamil ER (CALAN-SR) 180 MG CR tablet Take 1 tablet (180 mg) by mouth 2 times daily 180 tablet 1     Allergies   Allergen Reactions     Aleve [Naproxen]      Asa [Aspirin]      Codeine      Doxycycline      Elavil [Amitriptyline]      Etodolac      Gabapentin      Lyrica [Pregabalin]      [place] Other (See Comments)     Mushroom      Oxycodone      Oxycodone-Acetaminophen      Vicodin [Hydrocodone-Acetaminophen]      Mammogram Screening: Mammogram Screening: Patient over age 50, mutual decision to screen reflected in health maintenance.    Review of Systems   Constitutional: Negative for chills, fatigue and fever.   Respiratory: Negative for cough and shortness of breath.    Cardiovascular: Negative for chest pain.   Gastrointestinal: Negative for abdominal pain.   Genitourinary: Negative for difficulty urinating.   Musculoskeletal: Positive for arthralgias.   Neurological: Positive for dizziness.         OBJECTIVE:   /59   Pulse 70   Temp 98.2  F (36.8  C) (Oral)   Ht 1.524 m (5')   Wt 70.3 kg (155 lb)   BMI 30.27 kg/m   Estimated body mass index is 30.27 kg/m  as calculated from the following:    Height as of this encounter: 1.524 m (5').    Weight as of this encounter: 70.3 kg (155 lb).  Physical Exam  Vitals  signs reviewed.   Constitutional:       Appearance: She is well-developed.   HENT:      Head: Normocephalic and atraumatic.      Right Ear: Tympanic membrane normal.      Left Ear: Tympanic membrane normal.      Nose: Nose normal.      Mouth/Throat:      Mouth: Mucous membranes are moist.      Pharynx: Oropharynx is clear. Uvula midline.   Eyes:      General: Lids are normal.      Conjunctiva/sclera: Conjunctivae normal.      Pupils: Pupils are equal, round, and reactive to light.   Neck:      Musculoskeletal: Normal range of motion and neck supple.      Thyroid: No thyromegaly.   Cardiovascular:      Rate and Rhythm: Normal rate and regular rhythm.      Heart sounds: Normal heart sounds.   Pulmonary:      Effort: Pulmonary effort is normal.      Breath sounds: Normal breath sounds.   Abdominal:      General: Bowel sounds are normal.      Palpations: Abdomen is soft.      Tenderness: There is no abdominal tenderness.   Musculoskeletal: Normal range of motion.   Lymphadenopathy:      Cervical: No cervical adenopathy.   Skin:     General: Skin is warm and dry.   Neurological:      Mental Status: She is alert and oriented to person, place, and time.   Psychiatric:         Mood and Affect: Mood normal.         Speech: Speech normal.         Behavior: Behavior normal.         Thought Content: Thought content normal.         Diagnostic Test Results:  Labs reviewed in Epic    ASSESSMENT / PLAN:   1. Encounter for Medicare annual wellness exam  - she is going to think about a DEXA and let me know if she wants an order.  Previous DEXA in 2016 WNL.    - Scheduled for mammogram.   - she will go to the pharmacy to get her 2nd Shingrix    2. Vertigo  - meclizine (ANTIVERT) 25 MG tablet; Take 1 tablet (25 mg) by mouth 3 times daily as needed for dizziness  Dispense: 270 tablet; Refill: 1    Patient has been advised of split billing requirements and indicates understanding: Yes  COUNSELING:  Reviewed preventive health counseling,  as reflected in patient instructions       Regular exercise       Healthy diet/nutrition       Vision screening       Hearing screening       Fall risk prevention       Osteoporosis Prevention/Bone Health    Estimated body mass index is 30.27 kg/m  as calculated from the following:    Height as of this encounter: 1.524 m (5').    Weight as of this encounter: 70.3 kg (155 lb).    Weight management plan: Discussed healthy diet and exercise guidelines    She reports that she has never smoked. She has never used smokeless tobacco.      Appropriate preventive services were discussed with this patient, including applicable screening as appropriate for cardiovascular disease, diabetes, osteopenia/osteoporosis, and glaucoma.  As appropriate for age/gender, discussed screening for colorectal cancer, prostate cancer, breast cancer, and cervical cancer. Checklist reviewing preventive services available has been given to the patient.    Reviewed patients plan of care and provided an AVS. The Basic Care Plan (routine screening as documented in Health Maintenance) for June meets the Care Plan requirement. This Care Plan has been established and reviewed with the Patient.    Counseling Resources:  ATP IV Guidelines  Pooled Cohorts Equation Calculator  Breast Cancer Risk Calculator  Breast Cancer: Medication to Reduce Risk  FRAX Risk Assessment  ICSI Preventive Guidelines  Dietary Guidelines for Americans, 2010  USDA's MyPlate  ASA Prophylaxis  Lung CA Screening    KARLEE Ramires CNP  [facility]    Identified Health Risks:

## 2020-11-20 ENCOUNTER — TRANSFERRED RECORDS (OUTPATIENT)
Dept: HEALTH INFORMATION MANAGEMENT | Facility: CLINIC | Age: 80
End: 2020-11-20

## 2021-01-01 NOTE — LETTER
4/5/2018         RE: Swati Marinelli  [id number]  [place] [address]        Dear Colleague,    Thank you for referring your patient, Swati Marinelli, to the [facility]. Please see a copy of my visit note below.    SUBJECTIVE:  Swati Marinelli is a 77 year old female who is seen as self referral for evaluation of bilateral hip pain that has been present for many years. The patient has had many steroid injections in the past, the most recent injection being 05/08/17 in [place].    Present symptoms: lateral hip pain and low back pain. No groin pain.  Symptoms occur while walking and laying on the hips  Treatments up to this point: steroid injections and PT in the past    Orthopedic PMH:    Shoulder: Massive right rotator cuff tear, right RCR 08/20/09, and OA of the right shoulder. History of left RCR in the past.   Knee: OA left knee where she had previous injections including viscosupplementation. History of left TKA on 06/13/11 complicated by some stiffness. She had a post-operative manipulation and polyethlyene exchange on 03/26/12. History of right knee steroid injection in the past for OA.   Hip: Bilateral hip pain with 4-5 steroid injections in the past. She was receiving them every 4 months.   Back: History of ESIs in the past wich didn't help her pain   Ankle: Left achilles tendon repair rupture   General: No known history of RA, history of diffuse joint pains, and history of long-term Tylenol 3 usage.    Review of Systems:  Constitutional:  NEGATIVE for fever, chills, change in weight  Integumentary/Skin:  NEGATIVE for worrisome rashes, moles or lesions  Eyes:  NEGATIVE for vision changes or irritation  ENT/Mouth:  NEGATIVE for ear, mouth and throat problems  Resp:  NEGATIVE for significant cough or SOB  Breast:  NEGATIVE for masses, tenderness or discharge  CV:  NEGATIVE for chest pain, palpitations or peripheral edema  GI:  NEGATIVE for nausea, abdominal pain, heartburn, or change in bowel  "habits  :  Negative   Musculoskeletal:  See HPI above  Neuro:  NEGATIVE for weakness, dizziness or paresthesias  Endocrine:  NEGATIVE for temperature intolerance, skin/hair changes  Heme/allergy/immune:  NEGATIVE for bleeding problems  Psychiatric:  NEGATIVE for changes in mood or affect    Other PMH:   Past Medical History:   Diagnosis Date     Anxiety      Aortic regurgitation      GERD (gastroesophageal reflux disease)      HTN (hypertension)      Neurocardiogenic syncope      MANNY (obstructive sleep apnea)      TIA (transient ischemic attack)     occiptal     Surgical:   Past Surgical History:   Procedure Laterality Date     APPENDECTOMY OPEN CHILD  1951     C TOTAL KNEE ARTHROPLASTY Left 2011     C TOTAL KNEE ARTHROPLASTY Left 2013    revision     CARPAL TUNNEL RELEASE RT/LT       CATARACT IOL, RT/LT  2009    right 2/4/09, left 2/21/09     CHOLECYSTECTOMY  06/2012     COLONOSCOPY  2012    normal     MOHS MICROGRAPHIC PROCEDURE  2011    back - 2011, lip and back 5/2012     ROTATOR CUFF REPAIR RT/LT Right 2009     ROTATOR CUFF REPAIR RT/LT Left 04/20/2016     TONSILLECTOMY       Family Hx:    Family History   Problem Relation Age of Onset     Tuberculosis Mother      Myocardial Infarction Father      Myocardial Infarction Brother      Heart Surgery Daughter      Social Hx:   Social History     Social History     Marital status:      Spouse name: N/A     Number of children: N/A     Years of education: N/A     Social History Main Topics     Smoking status: Never Smoker     Smokeless tobacco: Never Used     Alcohol use No     Drug use: No     Sexual activity: No     OBJECTIVE:  Physical Exam:  /72 (BP Location: Right arm, Patient Position: Chair, Cuff Size: Adult Regular)  Pulse 93  Resp 16  Ht 1.511 m (4' 11.5\")  Wt 69.7 kg (153 lb 9.6 oz)  SpO2 93%  BMI 30.5 kg/m2  General Appearance: healthy, alert and no distress   Skin: no suspicious lesions or rashes  Neuro: Normal strength and tone, " mentation intact and speech normal  Vascular: good pulses, and cappillary refill   Lymph: no lymphadenopathy   Psych:  mentation appears normal and affect normal/bright  Resp: no increased work of breathing    Bilateral Hip Exam:  Lumbar range of motion:   Flexion: to touch mid shin   Extension some limitations and does reproduce her hip pain   Side bend: causes hip pain  Toe stand: able.    Heel stand: able  Seated SLR: negative  Supine SLR: negative    Tender: Bilateral greater trochanter and sciatic notches without radiation  Right Hip ROM:   Flexion: 110* degrees, no flexion contracture   Internal rotation: 10 degrees,    External rotation: 45 degrees with groin pain   Abduction 35 degrees  Left Hip ROM:   Flexion: full, no flexion contracture   Internal rotation: 20 degrees   External rotation: 40 degrees   Abduction: full  Strength: Good hip flexion and abduction strength bilaterally  Trendelenburg's: negative bilaterally    ASSESSMENT:   Greater trochanteric bursitis, bilateral hips  Possible right hip osteoarthritis     PLAN:   PT ordered. We decided to proceed with bilateral hip steroid injections today. With the patient's consent, bilateral hips injected in the greater trochanteric bursa with 80mg of Depomedrol and 2cc of local anesthetic after sterile prep. I recommended a Rheumatology consultation. All questions were answered.    Return to clinic: PRN, new x-rays of the right hip whenever she returns    JAYDEN Heller MD  Dept. Orthopedic Surgery  [facility]    This document serves as a record of the services and decisions personally performed and made by Dr. JAYDEN Heller MD. It was created on his behalf by Mike Mccain, a trained medical scribe. The creation of this record is based on the provider's personal observations and the statements of the patient. This document has been checked and approved by the attending provider.   Mike Mccain April 5, 2018 2:38 PM    SUBJECTIVE:  June  Yves is a 77 year old female who is seen as self referral for evaluation of bilateral hip pain that has been present for many years. The patient has had many steroid injections in the past, the most recent injection being 05/08/17 in [place].    Present symptoms: lateral hip pain and low back pain. No groin pain.  Symptoms occur while walking and laying on the hips  Treatments up to this point: steroid injections and PT in the past    Orthopedic PMH:    Shoulder: Massive right rotator cuff tear, right RCR 08/20/09, and OA of the right shoulder. History of left RCR in the past.   Knee: OA left knee where she had previous injections including viscosupplementation. History of left TKA on 06/13/11 complicated by stiffness. She had a post-operative manipulation and polyethlyene exchange on 03/26/12.   History of right knee steroid injection in the past for OA.   Hip: Bilateral hip pain with 4-5 steroid injections in the past. She was receiving them every 4 months.   Back: History of ESIs in the past wich didn't help her pain   Ankle: Left achilles tendon repair rupture   General: No known history of RA, history of diffuse joint pains, and history of long-term Tylenol 3 usage.    Review of Systems:  Constitutional:  NEGATIVE for fever, chills, change in weight  Integumentary/Skin:  NEGATIVE for worrisome rashes, moles or lesions  Eyes:  NEGATIVE for vision changes or irritation  ENT/Mouth:  NEGATIVE for ear, mouth and throat problems  Resp:  NEGATIVE for significant cough or SOB  Breast:  NEGATIVE for masses, tenderness or discharge  CV:  NEGATIVE for chest pain, palpitations or peripheral edema  GI:  NEGATIVE for nausea, abdominal pain, heartburn, or change in bowel habits  :  Negative   Musculoskeletal:  See HPI above  Neuro:  NEGATIVE for weakness, dizziness or paresthesias  Endocrine:  NEGATIVE for temperature intolerance, skin/hair changes  Heme/allergy/immune:  NEGATIVE for bleeding problems  Psychiatric:  NEGATIVE  "for changes in mood or affect    Other PMH:   Past Medical History:   Diagnosis Date     Anxiety      Aortic regurgitation      GERD (gastroesophageal reflux disease)      HTN (hypertension)      Neurocardiogenic syncope      MANNY (obstructive sleep apnea)      TIA (transient ischemic attack)     occiptal     Surgical:   Past Surgical History:   Procedure Laterality Date     APPENDECTOMY OPEN CHILD  1951     C TOTAL KNEE ARTHROPLASTY Left 2011     C TOTAL KNEE ARTHROPLASTY Left 2013    revision     CARPAL TUNNEL RELEASE RT/LT       CATARACT IOL, RT/LT  2009    right 2/4/09, left 2/21/09     CHOLECYSTECTOMY  06/2012     COLONOSCOPY  2012    normal     MOHS MICROGRAPHIC PROCEDURE  2011    back - 2011, lip and back 5/2012     ROTATOR CUFF REPAIR RT/LT Right 2009     ROTATOR CUFF REPAIR RT/LT Left 04/20/2016     TONSILLECTOMY       Family Hx:    Family History   Problem Relation Age of Onset     Tuberculosis Mother      Myocardial Infarction Father      Myocardial Infarction Brother      Heart Surgery Daughter      Social Hx:   Social History     Social History     Marital status:      Spouse name: N/A     Number of children: N/A     Years of education: N/A     Social History Main Topics     Smoking status: Never Smoker     Smokeless tobacco: Never Used     Alcohol use No     Drug use: No     Sexual activity: No     OBJECTIVE:  Physical Exam:  /72 (BP Location: Right arm, Patient Position: Chair, Cuff Size: Adult Regular)  Pulse 93  Resp 16  Ht 1.511 m (4' 11.5\")  Wt 69.7 kg (153 lb 9.6 oz)  SpO2 93%  BMI 30.5 kg/m2  General Appearance: healthy, alert and no distress   Skin: no suspicious lesions or rashes  Neuro: Normal strength and tone, mentation intact and speech normal  Vascular: good pulses, and cappillary refill   Lymph: no lymphadenopathy   Psych:  mentation appears normal and affect normal/bright  Resp: no increased work of breathing    Bilateral Hip Exam:  Lumbar range of motion:   Flexion: " to touch mid shin   Extension some limitations and does reproduce her hip pain   Side bend: causes hip pain  Toe stand: able.    Heel stand: able  Seated SLR: negative  Supine SLR: negative    Tender: Bilateral greater trochanter and sciatic notches without radiation  Right Hip ROM:   Flexion: 110* degrees, no flexion contracture   Internal rotation: 10 degrees,    External rotation: 45 degrees with groin pain   Abduction 35 degrees  Left Hip ROM:   Flexion: full, no flexion contracture   Internal rotation: 20 degrees   External rotation: 40 degrees   Abduction: full  Strength: Good hip flexion and abduction strength bilaterally  Trendelenburg's: negative bilaterally    ASSESSMENT:   Greater trochanteric bursitis, bilateral hips  Possible right hip osteoarthritis     PLAN:   PT ordered. We decided to proceed with bilateral hip steroid injections today. With the patient's consent, bilateral hips injected in the greater trochanteric bursa with 80mg of Depomedrol and 2cc of local anesthetic after sterile prep. I recommended a Rheumatology consultation due to polyarthralgias. All questions were answered.    Return to clinic: PRN, new x-rays of the right hip whenever she returns    JAYDEN Heller MD  Dept. Orthopedic Surgery  [facility]    This document serves as a record of the services and decisions personally performed and made by Dr. JAYDEN Heller MD. It was created on his behalf by Mike Mccain, a trained medical scribe. The creation of this record is based on the provider's personal observations and the statements of the patient. This document has been checked and approved by the attending provider.   Mike Mccain April 5, 2018 2:38 PM    Again, thank you for allowing me to participate in the care of your patient.        Sincerely,        Castillo Heller MD     Please see your pediatrician in 1-2 days for their first check up. This appointment is very important. The pediatrician will check to be sure that your baby is not losing too much weight, is staying hydrated, is not having jaundice and is continuing to do well.

## 2021-01-06 ENCOUNTER — OFFICE VISIT (OUTPATIENT)
Dept: FAMILY MEDICINE | Facility: CLINIC | Age: 81
End: 2021-01-06
Payer: COMMERCIAL

## 2021-01-06 VITALS
BODY MASS INDEX: 30.94 KG/M2 | TEMPERATURE: 98.2 F | HEART RATE: 86 BPM | WEIGHT: 157.6 LBS | HEIGHT: 60 IN | DIASTOLIC BLOOD PRESSURE: 73 MMHG | SYSTOLIC BLOOD PRESSURE: 114 MMHG

## 2021-01-06 DIAGNOSIS — N64.4 BREAST PAIN: ICD-10-CM

## 2021-01-06 DIAGNOSIS — M15.0 PRIMARY OSTEOARTHRITIS INVOLVING MULTIPLE JOINTS: Primary | ICD-10-CM

## 2021-01-06 DIAGNOSIS — I10 HYPERTENSION GOAL BP (BLOOD PRESSURE) < 150/90: Chronic | ICD-10-CM

## 2021-01-06 DIAGNOSIS — Z12.31 ENCOUNTER FOR SCREENING MAMMOGRAM FOR BREAST CANCER: ICD-10-CM

## 2021-01-06 DIAGNOSIS — E78.2 MIXED HYPERLIPIDEMIA: Chronic | ICD-10-CM

## 2021-01-06 PROCEDURE — 99213 OFFICE O/P EST LOW 20 MIN: CPT | Performed by: NURSE PRACTITIONER

## 2021-01-06 ASSESSMENT — ENCOUNTER SYMPTOMS
MYALGIAS: 0
FEVER: 0
WEAKNESS: 0
CHILLS: 0
ARTHRALGIAS: 1
COUGH: 0
DIZZINESS: 0
SHORTNESS OF BREATH: 0

## 2021-01-06 ASSESSMENT — MIFFLIN-ST. JEOR: SCORE: 1101.37

## 2021-01-06 NOTE — PROGRESS NOTES
Assessment & Plan     1. Primary osteoarthritis involving multiple joints  - recommend Tylenol 1000 mg every 8 hours prn pain.     2. Breast pain  - improved with decreasing caffeine intake    3. Encounter for screening mammogram for breast cancer  - *MA Screening Digital Bilateral; Future    4. Hypertension goal BP (blood pressure) < 150/90  - stable    5. Mixed hyperlipidemia  - stable.       20 minutes spent on the date of the encounter doing chart review, history and exam, documentation and further activities as noted above         Return in about 6 months (around 7/6/2021).    KARLEE Ramires [facility]     Swati Marinelli is a 80 year old who presents to clinic today for the following health issues     HPI       Hyperlipidemia Follow-Up      Are you regularly taking any medication or supplement to lower your cholesterol?   Yes- Yes    Are you having muscle aches or other side effects that you think could be caused by your cholesterol lowering medication?  Yes- unknown hurts all the time    Hypertension Follow-up      Do you check your blood pressure regularly outside of the clinic? Yes     Are you following a low salt diet? Yes    Are your blood pressures ever more than 140 on the top number (systolic) OR more   than 90 on the bottom number (diastolic), for example 140/90? No      How many servings of fruits and vegetables do you eat daily?  2-3    On average, how many sweetened beverages do you drink each day (Examples: soda, juice, sweet tea, etc.  Do NOT count diet or artificially sweetened beverages)?   2    How many days per week do you exercise enough to make your heart beat faster? 3 or less    How many minutes a day do you exercise enough to make your heart beat faster? 30 - 60    How many days per week do you miss taking your medication? 0      Reports the previous bilateral breast pain she was experiencing greatly improved when she cut back on caffeine  intake.  She is due for screening mammogram.     Continues to have generalized arthralgia pain.  Staying active and exercising.  She does not take anything OTC for pain relief.     Review of Systems   Constitutional: Negative for chills and fever.   Respiratory: Negative for cough and shortness of breath.    Cardiovascular: Negative for chest pain.   Musculoskeletal: Positive for arthralgias. Negative for myalgias.   Neurological: Negative for dizziness and weakness.            Objective    /73   Pulse 86   Temp 98.2  F (36.8  C) (Oral)   Ht 1.524 m (5')   Wt 71.5 kg (157 lb 9.6 oz)   BMI 30.78 kg/m    Body mass index is 30.78 kg/m .  Physical Exam  Vitals signs reviewed.   HENT:      Head: Normocephalic.   Cardiovascular:      Rate and Rhythm: Normal rate and regular rhythm.   Pulmonary:      Effort: Pulmonary effort is normal.      Breath sounds: Normal breath sounds.   Skin:     General: Skin is warm and dry.   Neurological:      Mental Status: She is alert and oriented to person, place, and time.   Psychiatric:         Mood and Affect: Mood normal.         Behavior: Behavior normal.

## 2021-03-05 DIAGNOSIS — I10 HYPERTENSION GOAL BP (BLOOD PRESSURE) < 150/90: Chronic | ICD-10-CM

## 2021-03-05 DIAGNOSIS — G45.9 TRANSIENT CEREBRAL ISCHEMIA, UNSPECIFIED TYPE: ICD-10-CM

## 2021-03-05 DIAGNOSIS — K21.9 GASTROESOPHAGEAL REFLUX DISEASE WITHOUT ESOPHAGITIS: Chronic | ICD-10-CM

## 2021-03-05 DIAGNOSIS — E78.2 MIXED HYPERLIPIDEMIA: Chronic | ICD-10-CM

## 2021-03-05 DIAGNOSIS — F41.9 ANXIETY: ICD-10-CM

## 2021-03-05 RX ORDER — LISINOPRIL AND HYDROCHLOROTHIAZIDE 20; 25 MG/1; MG/1
TABLET ORAL
Qty: 90 TABLET | Refills: 0 | Status: SHIPPED | OUTPATIENT
Start: 2021-03-05 | End: 2021-07-09

## 2021-03-05 RX ORDER — VERAPAMIL HYDROCHLORIDE 180 MG/1
180 TABLET, EXTENDED RELEASE ORAL 2 TIMES DAILY
Qty: 180 TABLET | Refills: 0 | Status: SHIPPED | OUTPATIENT
Start: 2021-03-05 | End: 2021-07-20

## 2021-03-05 RX ORDER — POTASSIUM CHLORIDE 1500 MG/1
TABLET, EXTENDED RELEASE ORAL
Qty: 90 TABLET | Refills: 0 | Status: SHIPPED | OUTPATIENT
Start: 2021-03-05 | End: 2021-07-20

## 2021-03-05 RX ORDER — PANTOPRAZOLE SODIUM 40 MG/1
40 TABLET, DELAYED RELEASE ORAL DAILY
Qty: 90 TABLET | Refills: 0 | Status: SHIPPED | OUTPATIENT
Start: 2021-03-05 | End: 2021-07-20

## 2021-03-05 RX ORDER — ROSUVASTATIN CALCIUM 5 MG/1
TABLET, COATED ORAL
Qty: 90 TABLET | Refills: 0 | Status: SHIPPED | OUTPATIENT
Start: 2021-03-05 | End: 2021-05-28

## 2021-03-05 RX ORDER — CITALOPRAM HYDROBROMIDE 20 MG/1
20 TABLET ORAL DAILY
Qty: 90 TABLET | Refills: 0 | Status: SHIPPED | OUTPATIENT
Start: 2021-03-05 | End: 2021-07-09

## 2021-03-05 RX ORDER — CLOPIDOGREL BISULFATE 75 MG/1
TABLET ORAL
Qty: 90 TABLET | Refills: 0 | Status: SHIPPED | OUTPATIENT
Start: 2021-03-05 | End: 2021-06-02

## 2021-03-05 NOTE — TELEPHONE ENCOUNTER
Prescription approved per [facility] Refill Protocol.  Need to keep upcoming appointment for further refills  Jada Humphrey RN

## 2021-05-28 ENCOUNTER — TELEPHONE (OUTPATIENT)
Dept: FAMILY MEDICINE | Facility: CLINIC | Age: 81
End: 2021-05-28

## 2021-05-28 DIAGNOSIS — E78.2 MIXED HYPERLIPIDEMIA: Chronic | ICD-10-CM

## 2021-05-28 RX ORDER — ROSUVASTATIN CALCIUM 5 MG/1
TABLET, COATED ORAL
Qty: 90 TABLET | Refills: 0 | Status: SHIPPED | OUTPATIENT
Start: 2021-05-28 | End: 2021-05-28

## 2021-05-28 NOTE — TELEPHONE ENCOUNTER
Reason for Call:  Other prescription    Detailed comments: Pt need urgent refill on Rosuvastatin Tab 5 mg    Phone Number Patient can be reached at: Cell number on file:    Telephone Information:   Mobile [phone]       Best Time: anytime    Can we leave a detailed message on this number? YES    Call taken on 5/28/2021 at 1:32 PM by Hetal Herrmann        Who is the form from?: Patient    Where did the form come from: Patient or family brought in       What clinic location was the form placed at?:     Where the form was placed: [place] box    What number is listed as a contact on the form?:[phone]     Additional comments: anytime    Call taken on 5/28/2021 at 1:29 PM by Hetal Herrmann

## 2021-05-28 NOTE — TELEPHONE ENCOUNTER
I can not find a form. Hetal, said that there is no form to fill out, just the refill request..Roxanna Chirinos MA/INES

## 2021-05-28 NOTE — TELEPHONE ENCOUNTER
Pt called in states she need refill for crestor 5 mg medication.  The Pt states it will take several days before she get from mail deliver pharmacy.  The Pt ask 1 month supply to be sen to the [facility] pharmacy.  Pt states the pharmacy is closed at 7 PM today and she want to pick it up before.    Please advise.      Hemant Person Nurse Advisor 5/28/2021 3:44 PM

## 2021-05-28 NOTE — TELEPHONE ENCOUNTER
Prescription approved per [facility] Refill Protocol.  Next 5 appointments (look out 90 days)    Jul 14, 2021  9:00 AM  SHORT with KARLEE Ibarra [facility] [id number] [facility] [id number] [phone]        Requested Prescriptions   Pending Prescriptions Disp Refills     rosuvastatin (CRESTOR) 5 MG tablet 90 tablet 0     Sig: TAKE 1 TABLET EVERY DAY       [facility] Protocol Passed - 5/28/2021  1:55 PM     HEATHER DurandN, RN

## 2021-05-28 NOTE — TELEPHONE ENCOUNTER
: See initial message below. RN has approved a travon refill. Please follow-up with the other part of the message regarding forms:    Who is the form from?: Patient     Where did the form come from: Patient or family brought in        What clinic location was the form placed at?:      Where the form was placed: [place] box     What number is listed as a contact on the form?:[phone]     Additional comments: anytime     Call taken on 5/28/2021 at 1:29 PM by Hetal Herrmann

## 2021-05-31 RX ORDER — ROSUVASTATIN CALCIUM 5 MG/1
TABLET, COATED ORAL
Qty: 90 TABLET | Refills: 0 | Status: SHIPPED | OUTPATIENT
Start: 2021-05-31 | End: 2021-05-31

## 2021-05-31 RX ORDER — ROSUVASTATIN CALCIUM 5 MG/1
TABLET, COATED ORAL
Qty: 90 TABLET | Refills: 0 | Status: SHIPPED | OUTPATIENT
Start: 2021-05-31 | End: 2021-10-14

## 2021-06-01 DIAGNOSIS — G45.9 TRANSIENT CEREBRAL ISCHEMIA, UNSPECIFIED TYPE: ICD-10-CM

## 2021-06-02 RX ORDER — CLOPIDOGREL BISULFATE 75 MG/1
TABLET ORAL
Qty: 90 TABLET | Refills: 0 | Status: SHIPPED | OUTPATIENT
Start: 2021-06-02 | End: 2021-07-20

## 2021-07-09 ENCOUNTER — OFFICE VISIT (OUTPATIENT)
Dept: FAMILY MEDICINE | Facility: CLINIC | Age: 81
End: 2021-07-09
Payer: COMMERCIAL

## 2021-07-09 VITALS
HEART RATE: 75 BPM | BODY MASS INDEX: 30.54 KG/M2 | WEIGHT: 156.4 LBS | DIASTOLIC BLOOD PRESSURE: 47 MMHG | OXYGEN SATURATION: 96 % | SYSTOLIC BLOOD PRESSURE: 91 MMHG | TEMPERATURE: 98.6 F

## 2021-07-09 DIAGNOSIS — E78.2 MIXED HYPERLIPIDEMIA: Chronic | ICD-10-CM

## 2021-07-09 DIAGNOSIS — F41.9 ANXIETY: Chronic | ICD-10-CM

## 2021-07-09 DIAGNOSIS — N18.31 STAGE 3A CHRONIC KIDNEY DISEASE (H): ICD-10-CM

## 2021-07-09 DIAGNOSIS — I10 HYPERTENSION GOAL BP (BLOOD PRESSURE) < 150/90: Primary | Chronic | ICD-10-CM

## 2021-07-09 LAB
ALBUMIN SERPL-MCNC: 3.9 G/DL (ref 3.4–5)
ALP SERPL-CCNC: 54 U/L (ref 40–150)
ALT SERPL W P-5'-P-CCNC: 35 U/L (ref 0–50)
ANION GAP SERPL CALCULATED.3IONS-SCNC: 6 MMOL/L (ref 3–14)
AST SERPL W P-5'-P-CCNC: 20 U/L (ref 0–45)
BILIRUB SERPL-MCNC: 0.7 MG/DL (ref 0.2–1.3)
BUN SERPL-MCNC: 23 MG/DL (ref 7–30)
CALCIUM SERPL-MCNC: 9.5 MG/DL (ref 8.5–10.1)
CHLORIDE SERPL-SCNC: 102 MMOL/L (ref 94–109)
CHOLEST SERPL-MCNC: 179 MG/DL
CO2 SERPL-SCNC: 30 MMOL/L (ref 20–32)
CREAT SERPL-MCNC: 1.16 MG/DL (ref 0.52–1.04)
ERYTHROCYTE [DISTWIDTH] IN BLOOD BY AUTOMATED COUNT: 13.4 % (ref 10–15)
GFR SERPL CREATININE-BSD FRML MDRD: 44 ML/MIN/{1.73_M2}
GLUCOSE SERPL-MCNC: 87 MG/DL (ref 70–99)
HCT VFR BLD AUTO: 43 % (ref 35–47)
HDLC SERPL-MCNC: 32 MG/DL
HGB BLD-MCNC: 14.1 G/DL (ref 11.7–15.7)
LDLC SERPL CALC-MCNC: 105 MG/DL
MCH RBC QN AUTO: 29.7 PG (ref 26.5–33)
MCHC RBC AUTO-ENTMCNC: 32.8 G/DL (ref 31.5–36.5)
MCV RBC AUTO: 91 FL (ref 78–100)
NONHDLC SERPL-MCNC: 147 MG/DL
PLATELET # BLD AUTO: 275 10E9/L (ref 150–450)
POTASSIUM SERPL-SCNC: 3.9 MMOL/L (ref 3.4–5.3)
PROT SERPL-MCNC: 7.8 G/DL (ref 6.8–8.8)
RBC # BLD AUTO: 4.75 10E12/L (ref 3.8–5.2)
SODIUM SERPL-SCNC: 138 MMOL/L (ref 133–144)
TRIGL SERPL-MCNC: 209 MG/DL
WBC # BLD AUTO: 7.1 10E9/L (ref 4–11)

## 2021-07-09 PROCEDURE — 80061 LIPID PANEL: CPT | Performed by: NURSE PRACTITIONER

## 2021-07-09 PROCEDURE — 80053 COMPREHEN METABOLIC PANEL: CPT | Performed by: NURSE PRACTITIONER

## 2021-07-09 PROCEDURE — 99214 OFFICE O/P EST MOD 30 MIN: CPT | Performed by: NURSE PRACTITIONER

## 2021-07-09 PROCEDURE — 85027 COMPLETE CBC AUTOMATED: CPT | Performed by: NURSE PRACTITIONER

## 2021-07-09 PROCEDURE — 36415 COLL VENOUS BLD VENIPUNCTURE: CPT | Performed by: NURSE PRACTITIONER

## 2021-07-09 RX ORDER — CITALOPRAM HYDROBROMIDE 20 MG/1
30 TABLET ORAL DAILY
Qty: 90 TABLET | Refills: 0
Start: 2021-07-09 | End: 2021-07-20

## 2021-07-09 ASSESSMENT — ENCOUNTER SYMPTOMS
FATIGUE: 1
DIZZINESS: 1
FEVER: 0
SLEEP DISTURBANCE: 0
APPETITE CHANGE: 0
CHILLS: 0
COUGH: 0
LIGHT-HEADEDNESS: 1
NERVOUS/ANXIOUS: 1
SHORTNESS OF BREATH: 0

## 2021-07-09 NOTE — PATIENT INSTRUCTIONS
Hold Lisinopril-Hydrochlorothiazide 20-25 mg daily.  Check blood pressures daily to a few times per week and keep a log.      Citalopram 20 mg- take 1.5 tablets daily for anxiety and mood.  We will follow-up on how you are feeling in 2 weeks.

## 2021-07-09 NOTE — PROGRESS NOTES
Assessment & Plan     1. Hypertension goal BP (blood pressure) < 150/90  - HOLD Lisinopril/HCTZ 20-25 mg daily.  Continue Verapamil  mg BID.  Check BP daily to a few times per week, keep log, and f/u in 2 weeks with telephone visit.  Notify office sooner if /90 or higher.   - CBC with platelets  - Comprehensive metabolic panel    2. Mixed hyperlipidemia  - chronic, stable with Rosuvastatin 5 mg daily.   - Comprehensive metabolic panel  - Lipid panel reflex to direct LDL Fasting    3. Stage 3a chronic kidney disease  - Comprehensive metabolic panel    4. Anxiety  - trial increase from Celexa 20 mg daily to 30 mg daily.  F/u in 2 weeks via telephone visit.   - citalopram (CELEXA) 20 MG tablet; Take 1.5 tablets (30 mg) by mouth daily  Dispense: 90 tablet; Refill: 0      Return in about 2 weeks (around 7/23/2021) for Blood Pressure.    [facility]   June is a 81 year old who presents for the following health issues     HPI     Hyperlipidemia Follow-Up      Are you regularly taking any medication or supplement to lower your cholesterol?   Yes- Crestor    Are you having muscle aches or other side effects that you think could be caused by your cholesterol lowering medication?  No    Hypertension Follow-up       Do you check your blood pressure regularly outside of the clinic? Yes     Are you following a low salt diet? Yes    Are your blood pressures ever more than 140 on the top number (systolic) OR more   than 90 on the bottom number (diastolic), for example 140/90? No  Running lower  - Reports she has been feeling more lightheaded and feeling like she is going to pass out.  She has been checking her BP at home and reports its running low (100's/60's).  Reports she is eating regular meals and feels she is staying well hydrated.  Has not taken any medications today and BP 91/47.    Anxiety Follow-Up    How are you doing with your anxiety since  your last visit? Worsened    Are you having other symptoms that might be associated with anxiety? No    Have you had a significant life event? No     Are you feeling depressed? Yes:  unsure if due to fatigue from low BP    Do you have any concerns with your use of alcohol or other drugs? No     States she has been feeling more anxious lately.  Unsure if this is due to her blood pressures.  Has times where she feels down, sad.  She is taking the Celexa daily.     Social History     Tobacco Use     Smoking status: Never Smoker     Smokeless tobacco: Never Used   Substance Use Topics     Alcohol use: No     Drug use: No     OMA-7 SCORE 11/14/2017 7/11/2018   Total Score 9 7     PHQ 11/14/2017 7/11/2018 7/24/2019   PHQ-9 Total Score 7 17 3   Q9: Thoughts of better off dead/self-harm past 2 weeks Not at all Not at all Not at all           How many servings of fruits and vegetables do you eat daily?  2-3    On average, how many sweetened beverages do you drink each day (Examples: soda, juice, sweet tea, etc.  Do NOT count diet or artificially sweetened beverages)?   2    How many days per week do you exercise enough to make your heart beat faster? 3 or less    How many minutes a day do you exercise enough to make your heart beat faster? 20 - 29    How many days per week do you miss taking your medication? 0    Dizziness  Onset/Duration: 1 month  Description:   Do you feel faint: YES  Does it feel like the surroundings (bed, room) are moving: no  Unsteady/off balance: YES  Have you passed out or fallen: no  Intensity: moderate  Progression of Symptoms: worsening  Accompanying Signs & Symptoms:  Heart palpitations or chest pain: no  Nausea, vomiting: no  Weakness or lack of coordination in arms or legs: YES  Vision or speech changes: no  Numbness or tingling: no  Ringing in ears (Tinnitus): YES  Hearing Loss: YES wears hearing aids  History:   Head trauma/concussion history: no  Previous similar symptoms: unknown. Has had  dizziness her adult life but she feels its worse  Recent bleeding history: no  Any new medications (BP?): no  Precipitating factors:   Worse with activity: sometimes  Worse with head movement: unknown hasn't thought about it  Alleviating factors:   Does staying in a fixed position give relief: no   Therapies tried and outcome: yes goes to dizzy doctor      Review of Systems   Constitutional: Positive for fatigue. Negative for appetite change, chills and fever.   Respiratory: Negative for cough and shortness of breath.    Cardiovascular: Negative for chest pain and peripheral edema.   Neurological: Positive for dizziness and light-headedness.   Psychiatric/Behavioral: Positive for mood changes. Negative for self-injury, sleep disturbance and suicidal ideas. The patient is nervous/anxious.             Objective    BP 91/47   Pulse 75   Temp 98.6  F (37  C) (Oral)   Wt 70.9 kg (156 lb 6.4 oz)   SpO2 96%   BMI 30.54 kg/m    Body mass index is 30.54 kg/m .  Physical Exam  Vitals signs reviewed.   Constitutional:       Appearance: She is well-developed.   HENT:      Head: Normocephalic.   Cardiovascular:      Rate and Rhythm: Normal rate and regular rhythm.   Pulmonary:      Effort: Pulmonary effort is normal.      Breath sounds: Normal breath sounds.   Skin:     General: Skin is warm and dry.   Neurological:      Mental Status: She is alert and oriented to person, place, and time.   Psychiatric:         Mood and Affect: Mood normal.         Behavior: Behavior normal.         Thought Content: Thought content normal.

## 2021-07-09 NOTE — LETTER
July 13, 2021      Swati [address]elli  [id number]   [place] [address]        Dear ,    We are writing to inform you of your test results.    Blood counts are stable, meaning you are not anemic.  We will follow-up for a telephone visit in 2 weeks to see how you are doing.     If you have any questions or concerns, please call the clinic at the number listed above.       Sincerely,      KARLEE Ibarra CNP/[facility]        Resulted Orders   CBC with platelets   Result Value Ref Range    WBC 7.1 4.0 - 11.0 10e9/L    RBC Count 4.75 3.8 - 5.2 10e12/L    Hemoglobin 14.1 11.7 - 15.7 g/dL    Hematocrit 43.0 35.0 - 47.0 %    MCV 91 78 - 100 fl    MCH 29.7 26.5 - 33.0 pg    MCHC 32.8 31.5 - 36.5 g/dL    RDW 13.4 10.0 - 15.0 %    Platelet Count 275 150 - 450 10e9/L   Comprehensive metabolic panel   Result Value Ref Range    Sodium 138 133 - 144 mmol/L    Potassium 3.9 3.4 - 5.3 mmol/L    Chloride 102 94 - 109 mmol/L    Carbon Dioxide 30 20 - 32 mmol/L    Anion Gap 6 3 - 14 mmol/L    Glucose 87 70 - 99 mg/dL      Comment:      Fasting specimen    Urea Nitrogen 23 7 - 30 mg/dL    Creatinine 1.16 (H) 0.52 - 1.04 mg/dL    GFR Estimate 44 (L) >60 mL/min/[1.73_m2]      Comment:      Non  GFR Calc  Starting 12/18/2018, serum creatinine based estimated GFR (eGFR) will be   calculated using the Chronic Kidney Disease Epidemiology Collaboration   (CKD-EPI) equation.      GFR Estimate If Black 51 (L) >60 mL/min/[1.73_m2]      Comment:       GFR Calc  Starting 12/18/2018, serum creatinine based estimated GFR (eGFR) will be   calculated using the Chronic Kidney Disease Epidemiology Collaboration   (CKD-EPI) equation.      Calcium 9.5 8.5 - 10.1 mg/dL    Bilirubin Total 0.7 0.2 - 1.3 mg/dL    Albumin 3.9 3.4 - 5.0 g/dL    Protein Total 7.8 6.8 - 8.8 g/dL    Alkaline Phosphatase 54 40 - 150 U/L    ALT 35 0 - 50 U/L    AST 20 0 - 45 U/L   Lipid panel reflex to direct LDL Fasting   Result  Value Ref Range    Cholesterol 179 <200 mg/dL    Triglycerides 209 (H) <150 mg/dL      Comment:      Borderline high:  150-199 mg/dl  High:             200-499 mg/dl  Very high:       >499 mg/dl  Fasting specimen      HDL Cholesterol 32 (L) >49 mg/dL    LDL Cholesterol Calculated 105 (H) <100 mg/dL      Comment:      Above desirable:  100-129 mg/dl  Borderline High:  130-159 mg/dL  High:             160-189 mg/dL  Very high:       >189 mg/dl      Non HDL Cholesterol 147 (H) <130 mg/dL      Comment:      Above Desirable:  130-159 mg/dl  Borderline high:  160-189 mg/dl  High:             190-219 mg/dl  Very high:       >219 mg/dl

## 2021-07-19 DIAGNOSIS — F41.9 ANXIETY: Chronic | ICD-10-CM

## 2021-07-19 DIAGNOSIS — I10 HYPERTENSION GOAL BP (BLOOD PRESSURE) < 150/90: Chronic | ICD-10-CM

## 2021-07-19 DIAGNOSIS — K21.9 GASTROESOPHAGEAL REFLUX DISEASE WITHOUT ESOPHAGITIS: Chronic | ICD-10-CM

## 2021-07-19 DIAGNOSIS — G45.9 TRANSIENT CEREBRAL ISCHEMIA, UNSPECIFIED TYPE: ICD-10-CM

## 2021-07-20 RX ORDER — CITALOPRAM HYDROBROMIDE 20 MG/1
30 TABLET ORAL DAILY
Qty: 90 TABLET | Refills: 0 | Status: SHIPPED | OUTPATIENT
Start: 2021-07-20 | End: 2021-08-24

## 2021-07-20 RX ORDER — CLOPIDOGREL BISULFATE 75 MG/1
TABLET ORAL
Qty: 90 TABLET | Refills: 1 | Status: SHIPPED | OUTPATIENT
Start: 2021-07-20 | End: 2022-01-05

## 2021-07-20 RX ORDER — PANTOPRAZOLE SODIUM 40 MG/1
40 TABLET, DELAYED RELEASE ORAL DAILY
Qty: 90 TABLET | Refills: 1 | Status: SHIPPED | OUTPATIENT
Start: 2021-07-20 | End: 2022-01-05

## 2021-07-20 RX ORDER — VERAPAMIL HYDROCHLORIDE 180 MG/1
180 TABLET, EXTENDED RELEASE ORAL 2 TIMES DAILY
Qty: 180 TABLET | Refills: 1 | Status: SHIPPED | OUTPATIENT
Start: 2021-07-20 | End: 2022-01-05

## 2021-07-20 RX ORDER — POTASSIUM CHLORIDE 1500 MG/1
TABLET, EXTENDED RELEASE ORAL
Qty: 90 TABLET | Refills: 1 | Status: SHIPPED | OUTPATIENT
Start: 2021-07-20 | End: 2022-01-05

## 2021-07-20 NOTE — TELEPHONE ENCOUNTER
Routing refill request to provider for review/approval because:  Labs out of range:  Creatinine    Joanne Kibry BSN, RN

## 2021-07-23 ENCOUNTER — VIRTUAL VISIT (OUTPATIENT)
Dept: FAMILY MEDICINE | Facility: CLINIC | Age: 81
End: 2021-07-23
Payer: COMMERCIAL

## 2021-07-23 DIAGNOSIS — I10 HYPERTENSION GOAL BP (BLOOD PRESSURE) < 150/90: Primary | Chronic | ICD-10-CM

## 2021-07-23 PROCEDURE — 99443 PR PHYSICIAN TELEPHONE EVALUATION 21-30 MIN: CPT | Performed by: NURSE PRACTITIONER

## 2021-07-23 RX ORDER — LISINOPRIL/HYDROCHLOROTHIAZIDE 10-12.5 MG
1 TABLET ORAL DAILY
Qty: 30 TABLET | Refills: 0 | Status: SHIPPED | OUTPATIENT
Start: 2021-07-23 | End: 2021-07-28

## 2021-07-23 ASSESSMENT — ANXIETY QUESTIONNAIRES
IF YOU CHECKED OFF ANY PROBLEMS ON THIS QUESTIONNAIRE, HOW DIFFICULT HAVE THESE PROBLEMS MADE IT FOR YOU TO DO YOUR WORK, TAKE CARE OF THINGS AT HOME, OR GET ALONG WITH OTHER PEOPLE: NOT DIFFICULT AT ALL
3. WORRYING TOO MUCH ABOUT DIFFERENT THINGS: NOT AT ALL
2. NOT BEING ABLE TO STOP OR CONTROL WORRYING: NOT AT ALL
7. FEELING AFRAID AS IF SOMETHING AWFUL MIGHT HAPPEN: NOT AT ALL
5. BEING SO RESTLESS THAT IT IS HARD TO SIT STILL: NOT AT ALL
GAD7 TOTAL SCORE: 0
6. BECOMING EASILY ANNOYED OR IRRITABLE: NOT AT ALL
1. FEELING NERVOUS, ANXIOUS, OR ON EDGE: NOT AT ALL

## 2021-07-23 ASSESSMENT — ENCOUNTER SYMPTOMS
CHILLS: 0
FEVER: 0
COUGH: 0
SHORTNESS OF BREATH: 0
SLEEP DISTURBANCE: 0
LIGHT-HEADEDNESS: 1
NERVOUS/ANXIOUS: 1
FATIGUE: 0
WEAKNESS: 0

## 2021-07-23 ASSESSMENT — PATIENT HEALTH QUESTIONNAIRE - PHQ9: 5. POOR APPETITE OR OVEREATING: NOT AT ALL

## 2021-07-23 NOTE — PROGRESS NOTES
Swati is a 81 year old who is being evaluated via a billable telephone visit.      What phone number would you like to be contacted at? [phone]  How would you like to obtain your AVS? [facility]    Assessment & Plan     1. Hypertension goal BP (blood pressure) < 150/90  - given she is having some BP readings in the 140-150's systolic, will add back Lisinopril/HCTZ but at half the dose she was taking previously. Continue to check BP and keep log for review at next appt. Ensure adequate water intake to prevent dehydration.   - continue Verapamil 180 mg BID.    - lisinopril-hydrochlorothiazide (ZESTORETIC) 10-12.5 MG tablet; Take 1 tablet by mouth daily  Dispense: 30 tablet; Refill: 0      Return in about 2 weeks (around 8/6/2021) for Virtual Appt for HTN Recheck.    KARLEE Ramires [facility]   Swati is a 81 year old who presents for the following health issues     HPI     Hypertension Follow-up      Do you check your blood pressure regularly outside of the clinic? Yes     Are you following a low salt diet? Yes    Are your blood pressures ever more than 140 on the top number (systolic) OR more   than 90 on the bottom number (diastolic), for example 140/90? Yes   2 weeks ago she presented to the clinic with low BP (91/47), had not taken BP meds that day, and was experiencing episodes of lightheadedness.  We decided to hold Lisinopril/HCTZ 20-25 and continue the Verapamil.  She has been drinking more water at home since that visit.    Home BP running 124/68, 140/71, 128/78, 133/72, 152/68, 146/70, 135/76, 124/74, 130/56, 130/55, 146/68, 144/61, 147/68, 137/68, 131/65, 115/61, 153/72, 125/67, 129/68, 151/81, 134/75, 128/78. Heart rate running 70-90's.    Reports the lightheadedness is still present on occasion.  She does suffer from vertigo chronically with symptoms that come and go.  Had an episode of swelling in her feet when she traveled to [place] but contributes this to a  long day of travel and has not had any further swelling.     Anxiety Follow-Up     How are you doing with your anxiety since your last visit? Improved     Are you having other symptoms that might be associated with anxiety? No    Have you had a significant life event? No     Are you feeling depressed? No    Do you have any concerns with your use of alcohol or other drugs? No  At last visit she reported increased anxiety.   At that time we discussed trial of increasing Celexa from 20 mg daily to 30 mg daily.  Today she reports she forgot to do this and has been taking the Celexa 20 mg daily.   Social History     Tobacco Use     Smoking status: Never Smoker     Smokeless tobacco: Never Used   Substance Use Topics     Alcohol use: No     Drug use: No     OMA-7 SCORE 11/14/2017 7/11/2018   Total Score 9 7     PHQ 11/14/2017 7/11/2018 7/24/2019   PHQ-9 Total Score 7 17 3   Q9: Thoughts of better off dead/self-harm past 2 weeks Not at all Not at all Not at all           How many servings of fruits and vegetables do you eat daily?  2-3    On average, how many sweetened beverages do you drink each day (Examples: soda, juice, sweet tea, etc.  Do NOT count diet or artificially sweetened beverages)?   0    How many days per week do you exercise enough to make your heart beat faster? 3 or less    How many minutes a day do you exercise enough to make your heart beat faster? 20 - 29    How many days per week do you miss taking your medication? 0        Review of Systems   Constitutional: Negative for chills, fatigue and fever.   Respiratory: Negative for cough and shortness of breath.    Cardiovascular: Negative for chest pain.   Neurological: Positive for light-headedness. Negative for syncope and weakness.   Psychiatric/Behavioral: Negative for mood changes, self-injury, sleep disturbance and suicidal ideas. The patient is nervous/anxious.             Objective           Vitals:  No vitals were obtained today due to virtual  visit.    Physical Exam   healthy, alert and no distress  PSYCH: Alert and oriented times 3; coherent speech, normal   rate and volume, able to articulate logical thoughts, able   to abstract reason, no tangential thoughts, no hallucinations   or delusions  Her affect is normal  RESP: No cough, no audible wheezing, able to talk in full sentences  Remainder of exam unable to be completed due to telephone visits            Phone call duration: 25 minutes

## 2021-07-24 ASSESSMENT — ANXIETY QUESTIONNAIRES: GAD7 TOTAL SCORE: 0

## 2021-07-28 ENCOUNTER — TELEPHONE (OUTPATIENT)
Dept: FAMILY MEDICINE | Facility: CLINIC | Age: 81
End: 2021-07-28

## 2021-07-28 DIAGNOSIS — I10 HYPERTENSION GOAL BP (BLOOD PRESSURE) < 150/90: Chronic | ICD-10-CM

## 2021-07-28 RX ORDER — LISINOPRIL/HYDROCHLOROTHIAZIDE 10-12.5 MG
1 TABLET ORAL DAILY
Qty: 90 TABLET | Refills: 0 | Status: SHIPPED | OUTPATIENT
Start: 2021-07-28 | End: 2021-08-06

## 2021-08-03 NOTE — PROGRESS NOTES
Swati is a 81 year old who is being evaluated via a billable telephone visit.      What phone number would you like to be contacted at? [phone]  How would you like to obtain your AVS? [facility]    Assessment & Plan     Hypertension goal BP (blood pressure) < 150/90  No change, patient did not start new medication that was sent over she will now  See patient instructions below for more plan.    - lisinopril-hydrochlorothiazide (ZESTORETIC) 10-12.5 MG tablet; Take 1 tablet by mouth daily    Sicca syndrome (H)  Continue with pcp, stable    Review of barby roth notes done          Return in about 4 weeks (around 9/3/2021) for blood pressure recheck in person please.    Oralia Brewster PA-C  [facility]   Swati is a 81 year old who presents for the following health issues      HPI     Hypertension Follow-up      Do you check your blood pressure regularly outside of the clinic? Yes     Are you following a low salt diet? Yes    Are your blood pressures ever more than 140 on the top number (systolic) OR more   than 90 on the bottom number (diastolic), for example 140/90? Yes        How many days per week do you miss taking your medication? 0    Has been following with Barby LYONS here.   They had been monitoring her blood pressure. Re-started medication at half the dose from previous.  Patient checking blood pressure at home:  130/56, 146/68, 147/68, 131/66, 115/61, 153/72, 133/66  No change in her symptoms.  Also on verapamil and potassium.     Has NOT BEEN TAKING lisinopril-hydrochlorothiazide 10-12.5 mg.   Used to be on 20-25 mg.    Has been trying to be more active.    No chest pain, shortness of breath, edema, PND, or orthopnea. No dizziness (OTHER than her neurocardiogenic syncope) or vision changes. No side effects from medications.     Had labs done last month.        Creatinine   Date Value Ref Range Status   07/09/2021 1.16 (H) 0.52 - 1.04 mg/dL Final       Review of  Systems   Constitutional, HEENT, cardiovascular, pulmonary, GI, , musculoskeletal, neuro, skin, endocrine and psych systems are negative, except as otherwise noted.      Objective           Vitals:  No vitals were obtained today due to virtual visit.    Physical Exam   healthy, alert and no distress  PSYCH: Alert and oriented times 3; coherent speech, normal   rate and volume, able to articulate logical thoughts, able   to abstract reason, no tangential thoughts, no hallucinations   or delusions  Her affect is normal  RESP: No cough, no audible wheezing, able to talk in full sentences  Remainder of exam unable to be completed due to telephone visits      Phone call duration: 16 minutes

## 2021-08-06 ENCOUNTER — VIRTUAL VISIT (OUTPATIENT)
Dept: FAMILY MEDICINE | Facility: CLINIC | Age: 81
End: 2021-08-06
Payer: COMMERCIAL

## 2021-08-06 DIAGNOSIS — M35.00 SICCA SYNDROME (H): ICD-10-CM

## 2021-08-06 DIAGNOSIS — I10 HYPERTENSION GOAL BP (BLOOD PRESSURE) < 150/90: Primary | Chronic | ICD-10-CM

## 2021-08-06 PROCEDURE — 99442 PR PHYSICIAN TELEPHONE EVALUATION 11-20 MIN: CPT | Performed by: PHYSICIAN ASSISTANT

## 2021-08-06 RX ORDER — LISINOPRIL/HYDROCHLOROTHIAZIDE 10-12.5 MG
1 TABLET ORAL DAILY
Qty: 90 TABLET | Refills: 0 | Status: SHIPPED | OUTPATIENT
Start: 2021-08-06 | End: 2021-12-10

## 2021-08-06 NOTE — PATIENT INSTRUCTIONS
Add back lisinopril-hydrochlorothiazide but at the new lower dose  Recheck in person with labs and blood pressure in one month  If dizziness worsens then stop and let us know  You dont need to be fasting for labs

## 2021-08-17 DIAGNOSIS — F41.9 ANXIETY: Chronic | ICD-10-CM

## 2021-08-24 RX ORDER — CITALOPRAM HYDROBROMIDE 20 MG/1
30 TABLET ORAL DAILY
Qty: 45 TABLET | Refills: 1 | Status: SHIPPED | OUTPATIENT
Start: 2021-08-24 | End: 2021-09-30

## 2021-09-16 ENCOUNTER — OFFICE VISIT (OUTPATIENT)
Dept: ORTHOPEDICS | Facility: CLINIC | Age: 81
End: 2021-09-16

## 2021-09-16 VITALS
HEART RATE: 96 BPM | HEIGHT: 61 IN | BODY MASS INDEX: 29.53 KG/M2 | WEIGHT: 156.4 LBS | OXYGEN SATURATION: 95 % | DIASTOLIC BLOOD PRESSURE: 72 MMHG | SYSTOLIC BLOOD PRESSURE: 124 MMHG

## 2021-09-16 DIAGNOSIS — M70.62 TROCHANTERIC BURSITIS OF BOTH HIPS: Primary | ICD-10-CM

## 2021-09-16 DIAGNOSIS — M70.61 TROCHANTERIC BURSITIS OF BOTH HIPS: Primary | ICD-10-CM

## 2021-09-16 PROCEDURE — 20610 DRAIN/INJ JOINT/BURSA W/O US: CPT | Mod: 50 | Performed by: ORTHOPAEDIC SURGERY

## 2021-09-16 PROCEDURE — 99213 OFFICE O/P EST LOW 20 MIN: CPT | Mod: 25 | Performed by: ORTHOPAEDIC SURGERY

## 2021-09-16 RX ORDER — METHYLPREDNISOLONE ACETATE 80 MG/ML
80 INJECTION, SUSPENSION INTRA-ARTICULAR; INTRALESIONAL; INTRAMUSCULAR; SOFT TISSUE
Status: SHIPPED | OUTPATIENT
Start: 2021-09-16

## 2021-09-16 RX ORDER — LIDOCAINE HYDROCHLORIDE 10 MG/ML
3 INJECTION, SOLUTION INFILTRATION; PERINEURAL
Status: SHIPPED | OUTPATIENT
Start: 2021-09-16

## 2021-09-16 RX ORDER — METHYLPREDNISOLONE ACETATE 80 MG/ML
80 INJECTION, SUSPENSION INTRA-ARTICULAR; INTRALESIONAL; INTRAMUSCULAR; SOFT TISSUE ONCE
Status: ACTIVE | OUTPATIENT
Start: 2021-09-16

## 2021-09-16 RX ADMIN — METHYLPREDNISOLONE ACETATE 80 MG: 80 INJECTION, SUSPENSION INTRA-ARTICULAR; INTRALESIONAL; INTRAMUSCULAR; SOFT TISSUE at 10:08

## 2021-09-16 RX ADMIN — METHYLPREDNISOLONE ACETATE 80 MG: 80 INJECTION, SUSPENSION INTRA-ARTICULAR; INTRALESIONAL; INTRAMUSCULAR; SOFT TISSUE at 10:10

## 2021-09-16 RX ADMIN — LIDOCAINE HYDROCHLORIDE 3 ML: 10 INJECTION, SOLUTION INFILTRATION; PERINEURAL at 10:06

## 2021-09-16 RX ADMIN — METHYLPREDNISOLONE ACETATE 80 MG: 80 INJECTION, SUSPENSION INTRA-ARTICULAR; INTRALESIONAL; INTRAMUSCULAR; SOFT TISSUE at 10:06

## 2021-09-16 ASSESSMENT — MIFFLIN-ST. JEOR: SCORE: 1106.81

## 2021-09-16 NOTE — PROGRESS NOTES
SUBJECTIVE:   Swati Marinelli is here in follow up of bilateral hip greater trochanteric bursitis and right hip osteoarthritis.     Last injection(s):   1. The patient had 4-5 previous hip steroid injections receiving them every 3-4 months in [place].  2. Right greater trochanteric bursa corticosteroid injection 8/19/20. Length of effectiveness:  6+ months  2. Bilateral Hip greater trochanteric bursal steroid injection on 04/05/18, and 8/1/19. Length of effectiveness: 3 months, and 8/23/18. Length of effectiveness: 3-4 months.  Physical therapy ordered-- didn't do that, but does exercise classes.    Symptoms: pain lateral hips.  NOT groin area.     Review of Systems:  Constitutional/General: Negative for fever, chills, change in weight  Integumentary/Skin: Negative for worrisome rashes, moles, or lesions  Neuro: Negative for weakness, dizziness, or paresthesias   Psychiatric: negative for changes in mood or affect     OBJECTIVE:  Physical Exam:  /73 (BP Location: Right arm, Patient Position: Sitting, Cuff Size: Adult Regular)   Pulse 91   Wt 69 kg (152 lb 3.2 oz)   SpO2 96%   BMI 29.48 kg/m    General Appearance: healthy, alert and no distress   Skin: no suspicious lesions or rashes  Neuro: Normal strength and tone, mentation intact and speech normal  Vascular: good pulses, and capillary refill   Lymph: no lymphadenopathy   Psych:  mentation appears normal and affect normal/bright  Resp: no increased work of breathing     Bilateral hip  Tender greater trochanteric bursae.  Range of motion of hip causes minimal pain in lateral hips  Special tests: Trendelenburg's: negative bilateral       X-rays:  From 08/21/18,  moderate hypertrophic changes in both hips, osteophytes on the acetabulum and femoral head, and significant calcification on the greater trochanters bilaterally.    Xrays right hip 8/19/20 with mild osteoarthritis changes and lateral calcifications.         ASSESSMENT:   1. Greater trochanteric bursitis,  right hip  2. Mild- Moderate OA, bilateral hips. Still not much osteoarthritis pain presently.       PLAN:   We decided to proceed with repeat bilateral greater trochanteric bursal injection.  Discussed the risks of repeat steroid injections to a certain area and I urged the patient to wait more than 6 months between injections, if at all possible.  Procedure Note:   Informed consent obtained. Risks, benefits and complications of the injection were discussed with the patient and the patient elected to proceed. Bilateral hips injected in the greater trochanteric bursa with 80mg of Depomedrol and 2cc of local anesthetic after sterile prep.    Pes bursitis:  Ice.  Can't use voltaren gel due to severe nsaid allergy. Could use topical analgesics.    Doesn't want to do physical therapy.       Return to clinic: as needed.       JAYDEN Heller MD  Dept. Orthopedic Surgery  [facility]

## 2021-09-16 NOTE — PROGRESS NOTES
Large Joint Injection/Arthocentesis: bilateral greater trochanteric bursa    Date/Time: 9/16/2021 10:06 AM  Performed by: Kota Mae  Authorized by: Castillo Heller MD     Indications:  Pain  Needle Size:  22 G  Guidance: landmark guided    Approach:  Lateral  Location:  Hip  Laterality:  Bilateral      Site:  Bilateral greater trochanteric bursa  Medications (Right):  80 mg methylPREDNISolone 80 MG/ML; 3 mL lidocaine 1 %  Medications (Left):  80 mg methylPREDNISolone 80 MG/ML; 3 mL lidocaine 1 %  Outcome:  Tolerated well, no immediate complications  Procedure discussed: discussed risks, benefits, and alternatives    Consent Given by:  Patient  Timeout: timeout called immediately prior to procedure    Prep: patient was prepped and draped in usual sterile fashion

## 2021-09-16 NOTE — LETTER
9/16/2021         RE: Swati Marinelli  [address]  [place] [address]        Dear Colleague,    Thank you for referring your patient, Swati Marinelli, to the [facility]. Please see a copy of my visit note below.    Large Joint Injection/Arthocentesis: bilateral greater trochanteric bursa    Date/Time: 9/16/2021 10:06 AM  Performed by: Kota Mae  Authorized by: Castillo Heller MD     Indications:  Pain  Needle Size:  22 G  Guidance: landmark guided    Approach:  Lateral  Location:  Hip  Laterality:  Bilateral      Site:  Bilateral greater trochanteric bursa  Medications (Right):  80 mg methylPREDNISolone 80 MG/ML; 3 mL lidocaine 1 %  Medications (Left):  80 mg methylPREDNISolone 80 MG/ML; 3 mL lidocaine 1 %  Outcome:  Tolerated well, no immediate complications  Procedure discussed: discussed risks, benefits, and alternatives    Consent Given by:  Patient  Timeout: timeout called immediately prior to procedure    Prep: patient was prepped and draped in usual sterile fashion               SUBJECTIVE:   Swati Marinelli is here in follow up of bilateral hip greater trochanteric bursitis and right hip osteoarthritis.     Last injection(s):   1. The patient had 4-5 previous hip steroid injections receiving them every 3-4 months in [place].  2. Right greater trochanteric bursa corticosteroid injection 8/19/20. Length of effectiveness:  6+ months  2. Bilateral Hip greater trochanteric bursal steroid injection on 04/05/18, and 8/1/19. Length of effectiveness: 3 months, and 8/23/18. Length of effectiveness: 3-4 months.  Physical therapy ordered-- didn't do that, but does exercise classes.    Symptoms: pain lateral hips.  NOT groin area.     Review of Systems:  Constitutional/General: Negative for fever, chills, change in weight  Integumentary/Skin: Negative for worrisome rashes, moles, or lesions  Neuro: Negative for weakness, dizziness, or paresthesias   Psychiatric: negative for  changes in mood or affect     OBJECTIVE:  Physical Exam:  /73 (BP Location: Right arm, Patient Position: Sitting, Cuff Size: Adult Regular)   Pulse 91   Wt 69 kg (152 lb 3.2 oz)   SpO2 96%   BMI 29.48 kg/m    General Appearance: healthy, alert and no distress   Skin: no suspicious lesions or rashes  Neuro: Normal strength and tone, mentation intact and speech normal  Vascular: good pulses, and capillary refill   Lymph: no lymphadenopathy   Psych:  mentation appears normal and affect normal/bright  Resp: no increased work of breathing     Bilateral hip  Tender greater trochanteric bursae.  Range of motion of hip causes minimal pain in lateral hips  Special tests: Trendelenburg's: negative bilateral       X-rays:  From 08/21/18,  moderate hypertrophic changes in both hips, osteophytes on the acetabulum and femoral head, and significant calcification on the greater trochanters bilaterally.    Xrays right hip 8/19/20 with mild osteoarthritis changes and lateral calcifications.         ASSESSMENT:   1. Greater trochanteric bursitis, right hip  2. Mild- Moderate OA, bilateral hips. Still not much osteoarthritis pain presently.       PLAN:   We decided to proceed with repeat bilateral greater trochanteric bursal injection.  Discussed the risks of repeat steroid injections to a certain area and I urged the patient to wait more than 6 months between injections, if at all possible.  Procedure Note:   Informed consent obtained. Risks, benefits and complications of the injection were discussed with the patient and the patient elected to proceed. Bilateral hips injected in the greater trochanteric bursa with 80mg of Depomedrol and 2cc of local anesthetic after sterile prep.    Pes bursitis:  Ice.  Can't use voltaren gel due to severe nsaid allergy. Could use topical analgesics.    Doesn't want to do physical therapy.       Return to clinic: as needed.       JAYDEN Heller MD  Dept. Orthopedic Surgery  [facility]         Again, thank you for allowing me to participate in the care of your patient.        Sincerely,        Castillo Heller MD

## 2021-09-29 DIAGNOSIS — F41.9 ANXIETY: Chronic | ICD-10-CM

## 2021-09-30 RX ORDER — CITALOPRAM HYDROBROMIDE 20 MG/1
TABLET ORAL
Qty: 45 TABLET | Refills: 11 | Status: SHIPPED | OUTPATIENT
Start: 2021-09-30 | End: 2022-04-18

## 2021-09-30 NOTE — TELEPHONE ENCOUNTER
Prescription approved per [facility] Refill Protocol.  Melvina Yeboah RN, BSN   [facility]

## 2021-10-12 DIAGNOSIS — E78.2 MIXED HYPERLIPIDEMIA: Chronic | ICD-10-CM

## 2021-10-14 RX ORDER — ROSUVASTATIN CALCIUM 5 MG/1
TABLET, COATED ORAL
Qty: 90 TABLET | Refills: 2 | Status: SHIPPED | OUTPATIENT
Start: 2021-10-14 | End: 2022-04-13

## 2021-10-28 ENCOUNTER — OFFICE VISIT (OUTPATIENT)
Dept: URGENT CARE | Facility: URGENT CARE | Age: 81
End: 2021-10-28
Payer: COMMERCIAL

## 2021-10-28 ENCOUNTER — NURSE TRIAGE (OUTPATIENT)
Dept: FAMILY MEDICINE | Facility: CLINIC | Age: 81
End: 2021-10-28

## 2021-10-28 VITALS
DIASTOLIC BLOOD PRESSURE: 70 MMHG | SYSTOLIC BLOOD PRESSURE: 122 MMHG | WEIGHT: 153.8 LBS | HEART RATE: 66 BPM | BODY MASS INDEX: 29.06 KG/M2 | OXYGEN SATURATION: 95 % | TEMPERATURE: 99.9 F

## 2021-10-28 DIAGNOSIS — R42 DIZZINESS: Primary | ICD-10-CM

## 2021-10-28 PROCEDURE — 99214 OFFICE O/P EST MOD 30 MIN: CPT | Performed by: FAMILY MEDICINE

## 2021-10-28 NOTE — TELEPHONE ENCOUNTER
"Spoke with patient regarding her symptoms.  She states  That she is not currently having the dizziness.  States has been occurring intermittently for a few months but leaving town for wedding Saturday in [place] discontinue and wondering if should be seen.  She states it isn't the swirling/spinning dizziness.  States it feels more like she's just going to faint when the episodes occur.  Vision does not go black but states \"gets kind of fuzzy feeling\".  No chest pain.  She does have a history of vertigo but states \"It's not like that\".  Is on medications for hypertension.    She will be seen in urgent care today for assessment.  Aware that we are already open for walk in urgent care and close at 8pm.  Will have spouse bring her.  Did make her aware she may need to wait; unsure what wait time will be.  Irlanda Christian RN    "

## 2021-10-28 NOTE — PROGRESS NOTES
Chief complaint: dizziess     Has a history of veritgo     For years now has had dizziness where if patient lays on her left side will hve a dizzy spell   Has seen primary care provider and was prescribed with meclizine and has helped    Patient is flying to [facility] [facility]. and is worried the flight would make her more off balance   Description: feels like she's going to fall (not faint- patient clarified) tipping over like shes drunk   Aggravating factors: when lay left side   Relieving factors: focusing   Chest pain or palpitation: No  Syncope or presyncope: No  Motor,sensory weakness, or slurring of speech: No  Headache: No  Blurring of vision : No  Nausea: no  Vomiting: No  Abdominal pain: No  Recent trauma: No    Tried supportive treatment  At home no relief  Additional Information:     No worsening hearing loss or ringing     Problem list and histories reviewed & adjusted, as indicated.  Additional history: as documented    Problem list, Medication list, Allergies, and Medical/Social/Surgical histories reviewed in EPIC and updated as appropriate.    ROS:  Constitutional, HEENT, cardiovascular, pulmonary, gi and gu systems are negative, except as otherwise noted.    OBJECTIVE:                                                    /70   Pulse 66   Temp 99.9  F (37.7  C) (Tympanic)   Wt 69.8 kg (153 lb 12.8 oz)   SpO2 95%   BMI 29.06 kg/m    Body mass index is 29.06 kg/m .  GENERAL: healthy, alert and no distress  EYES: Eyes grossly normal to inspection, PERRL and conjunctivae and sclerae normal  HENT: ear canals and TM's normal, nose and mouth without ulcers or lesions  NECK: no adenopathy, no asymmetry, masses, or scars and thyroid normal to palpation  RESP: lungs clear to auscultation - no rales, rhonchi or wheezes  CV: regular rate and rhythm, normal S1 S2, no S3 or S4, no murmur, click or rub, no peripheral edema and peripheral pulses strong  ABDOMEN: soft, nontender, no hepatosplenomegaly, no masses  and bowel sounds normal  MS: no gross musculoskeletal defects noted, no edema  SKIN: no suspicious lesions or rashes  NEURO: Normal strength and tone, mentation intact and speech normal  PSYCH: mentation appears normal, affect normal/bright    Diagnostic Test Results:  none      ASSESSMENT/PLAN:                                                        ICD-10-CM    1. Dizziness  R42 Adult Neurology Referral       Per patient dizziness has been present for years she merely wanted to know what to do as she wants to make sure she will be ok when she travels to visit for a wedding  May take meclizine as needed - she has some. Advised can take 1 every 8 hours   Referred to neurology for follow up   Alarm signs or symptoms discussed, if present recommend go to ER     Temp 99.9 here   I recommended covid rule out, and cbc and urinalysis. However patient declined  She states she is not feeling feverish and she feels well except for this dizziness  She plans to observe as she thinks it is a one time measurement   She plans to go home and monitor and if persist then she will come back     See patient instructions.  Signs and symptoms of worsening dizziness discussed. Alarm symptoms of possible CVA or cardiac events discussed. If with any of these advised to go to ER.    No driving and avoid being on any unprotected heights until dizziness is resolved.  Aware to come back in if with worsening symptoms or if no relief despite treatment plan  Patient voiced understanding and had no further questions.     MD Shabnam June MD  [facility] [place]

## 2021-10-28 NOTE — TELEPHONE ENCOUNTER
"Red flag call, (not red flag), see notes, pt informs has had symptoms x 1 month, aware PCP out of clinic for maternity leave and thought would wait until PCP back, pt flying this weekend and thought should get checked out for periodic dizziness episodes, see note, routed to PCP team, pt agrees to go to urgent care today    INFORM pt of plan, [phone] (home)       Nurse Triage SBAR    Is this a 2nd Level Triage? YES, LICENSED PRACTITIONER REVIEW IS REQUIRED    Situation: and Background: pt c/o periodic episodes of dizziness x 1+ month, has vertigo, /66, P-74, does not feel heart beating or irregular pulse, feels like passing out when has episode, pt not having issue at time of call, pt also flying this weekend to go to granddaughter's wedding, discussed at length, recommend appointment today, pt declines emergency room, wants to see PCP office but PCP on maternity leave, will confirm with PCP office ok to go to urgent care today      Assessment (See information below for more triage details.) pt denies check pain, breathing issues, headaches, see triage note, \"pt feels like could pass out when has episode\"    Recommendation: appointment today, also recommend to discuss flying this weekend and if okay, also recommend pt to use walker for safety reasons    Routing to provider for recommendation on second level triage.    Protocol Recommended Disposition: Urgent care center to sort out issues    Reason for Disposition    Patient wants to be seen    Additional Information    Negative: Shock suspected (e.g., cold/pale/clammy skin, too weak to stand, low BP, rapid pulse)    Negative: Difficult to awaken or acting confused (e.g., disoriented, slurred speech)    Negative: Fainted, and still feels dizzy afterwards    Negative: Severe difficulty breathing (e.g., struggling for each breath, speaks in single words)    Negative: Overdose (accidental or intentional) of medications    Negative: New neurologic deficit that " is present now: * Weakness of the face, arm, or leg on one side of the body * Numbness of the face, arm, or leg on one side of the body * Loss of speech or garbled speech    Negative: Heart beating < 50 beats per minute OR > 140 beats per minute    Negative: Sounds like a life-threatening emergency to the triager    Negative: Chest pain    Negative: Rectal bleeding, bloody stool, or tarry-black stool    Negative: Vomiting is the main symptom    Negative: Diarrhea is the main symptom    Negative: Headache is the main symptom    Negative: Heat exhaustion suspected (i.e., dehydration from heat exposure)    Negative: Patient states that he/she is having an anxiety/panic attack    Negative: SEVERE dizziness (e.g., unable to stand, requires support to walk, feels like passing out now)    Negative: SEVERE headache or neck pain    Negative: Spinning or tilting sensation (vertigo) present now and one or more stroke risk factors (i.e., hypertension, diabetes, prior stroke/TIA, heart attack, age over 60) (Exception: prior physician evaluation for this AND no different/worse than usual)    Negative: Loss of vision or double vision    Negative: Extra heart beats OR irregular heart beating (i.e., 'palpitations')    Negative: Difficulty breathing    Negative: Drinking very little and has signs of dehydration (e.g., no urine > 12 hours, very dry mouth, very lightheaded)    Negative: Follows bleeding (e.g., stomach, rectum, vagina) (Exception: became dizzy from sight of small amount blood)    Negative: Patient sounds very sick or weak to the triager    Negative: Lightheadedness (dizziness) present now, after 2 hours of rest and fluids    Negative: Spinning or tilting sensation (vertigo) present now    Negative: Fever > 103 F (39.4 C)    Negative: Fever > 100.0 F (37.8 C) and has diabetes mellitus or a weak immune system (e.g., HIV positive, cancer chemotherapy, organ transplant, splenectomy, chronic steroids)    Negative: Vomiting  "occurs with dizziness    Answer Assessment - Initial Assessment Questions  1. DESCRIPTION: \"Describe your dizziness.\"      Cannot predict, \"feels like going to faint\" never happens in am  2. LIGHTHEADED: \"Do you feel lightheaded?\" (e.g., somewhat faint, woozy, weak upon standing)      Not now, but scary when happens  3. VERTIGO: \"Do you feel like either you or the room is spinning or tilting?\" (i.e. vertigo)      No   4. SEVERITY: \"How bad is it?\"  \"Do you feel like you are going to faint?\" \"Can you stand and walk?\"     - MODERATE - interferes with normal activities (e.g., work, school)     5. ONSET:  \"When did the dizziness begin?\"      One month ago   6. AGGRAVATING FACTORS: \"Does anything make it worse?\" (e.g., standing, change in head position)      No, informs always has vertigo  7. HEART RATE: \"Can you tell me your heart rate?\" \"How many beats in 15 seconds?\"  (Note: not all patients can do this)        BP okay, 133/78, unclear on pulse  8. CAUSE: \"What do you think is causing the dizziness?\"      No idea  9. RECURRENT SYMPTOM: \"Have you had dizziness before?\" If so, ask: \"When was the last time?\" \"What happened that time?\"      Yes, during the summer, at daughter's in [place], thought of going in to get checked but never went in   10. OTHER SYMPTOMS: \"Do you have any other symptoms?\" (e.g., fever, chest pain, vomiting, diarrhea, bleeding)        Feels weak when has spells  11. PREGNANCY: \"Is there any chance you are pregnant?\" \"When was your last menstrual period?\"        n/a    Protocols used: DIZZINESS-A-[place]    Ivone Prado RN, BSN  Message handled by CLINIC NURSE.    "

## 2021-11-17 ENCOUNTER — TRANSFERRED RECORDS (OUTPATIENT)
Dept: HEALTH INFORMATION MANAGEMENT | Facility: CLINIC | Age: 81
End: 2021-11-17

## 2021-12-08 DIAGNOSIS — I10 HYPERTENSION GOAL BP (BLOOD PRESSURE) < 150/90: Chronic | ICD-10-CM

## 2021-12-09 NOTE — TELEPHONE ENCOUNTER
Routing refill request to provider for review/approval because:  Labs out of range:  Creatinine    Joanne Kirby BSN, RN

## 2021-12-10 RX ORDER — LISINOPRIL/HYDROCHLOROTHIAZIDE 10-12.5 MG
1 TABLET ORAL DAILY
Qty: 90 TABLET | Refills: 0 | Status: SHIPPED | OUTPATIENT
Start: 2021-12-10 | End: 2022-02-11

## 2022-01-05 DIAGNOSIS — I10 HYPERTENSION GOAL BP (BLOOD PRESSURE) < 150/90: Chronic | ICD-10-CM

## 2022-01-05 DIAGNOSIS — K21.9 GASTROESOPHAGEAL REFLUX DISEASE WITHOUT ESOPHAGITIS: Chronic | ICD-10-CM

## 2022-01-05 DIAGNOSIS — G45.9 TRANSIENT CEREBRAL ISCHEMIA, UNSPECIFIED TYPE: ICD-10-CM

## 2022-01-05 RX ORDER — VERAPAMIL HYDROCHLORIDE 180 MG/1
180 TABLET, EXTENDED RELEASE ORAL 2 TIMES DAILY
Qty: 180 TABLET | Refills: 1 | Status: SHIPPED | OUTPATIENT
Start: 2022-01-05 | End: 2022-07-22

## 2022-01-05 RX ORDER — POTASSIUM CHLORIDE 1500 MG/1
TABLET, EXTENDED RELEASE ORAL
Qty: 90 TABLET | Refills: 1 | Status: SHIPPED | OUTPATIENT
Start: 2022-01-05 | End: 2022-04-13

## 2022-01-05 RX ORDER — CLOPIDOGREL BISULFATE 75 MG/1
TABLET ORAL
Qty: 90 TABLET | Refills: 1 | Status: SHIPPED | OUTPATIENT
Start: 2022-01-05 | End: 2022-07-22

## 2022-01-05 RX ORDER — PANTOPRAZOLE SODIUM 40 MG/1
40 TABLET, DELAYED RELEASE ORAL DAILY
Qty: 90 TABLET | Refills: 1 | Status: SHIPPED | OUTPATIENT
Start: 2022-01-05 | End: 2022-07-22

## 2022-01-05 NOTE — TELEPHONE ENCOUNTER
Routing refill request to provider for review/approval because:  Labs out of range:    Creatinine   Date Value Ref Range Status   07/09/2021 1.16 (H) 0.52 - 1.04 mg/dL Final     Jada Humphrey RN

## 2022-01-29 ENCOUNTER — HEALTH MAINTENANCE LETTER (OUTPATIENT)
Age: 82
End: 2022-01-29

## 2022-02-07 ENCOUNTER — OFFICE VISIT (OUTPATIENT)
Dept: URGENT CARE | Facility: URGENT CARE | Age: 82
End: 2022-02-07
Payer: COMMERCIAL

## 2022-02-07 VITALS
SYSTOLIC BLOOD PRESSURE: 115 MMHG | TEMPERATURE: 98 F | WEIGHT: 153 LBS | OXYGEN SATURATION: 97 % | DIASTOLIC BLOOD PRESSURE: 62 MMHG | HEART RATE: 87 BPM | BODY MASS INDEX: 28.91 KG/M2

## 2022-02-07 DIAGNOSIS — J06.9 VIRAL URI: Primary | ICD-10-CM

## 2022-02-07 DIAGNOSIS — R05.9 COUGH: ICD-10-CM

## 2022-02-07 PROCEDURE — 99213 OFFICE O/P EST LOW 20 MIN: CPT | Performed by: NURSE PRACTITIONER

## 2022-02-07 PROCEDURE — U0003 INFECTIOUS AGENT DETECTION BY NUCLEIC ACID (DNA OR RNA); SEVERE ACUTE RESPIRATORY SYNDROME CORONAVIRUS 2 (SARS-COV-2) (CORONAVIRUS DISEASE [COVID-19]), AMPLIFIED PROBE TECHNIQUE, MAKING USE OF HIGH THROUGHPUT TECHNOLOGIES AS DESCRIBED BY CMS-2020-01-R: HCPCS | Mod: 90 | Performed by: NURSE PRACTITIONER

## 2022-02-07 PROCEDURE — U0005 INFEC AGEN DETEC AMPLI PROBE: HCPCS | Mod: 90 | Performed by: NURSE PRACTITIONER

## 2022-02-07 PROCEDURE — 99000 SPECIMEN HANDLING OFFICE-LAB: CPT | Performed by: NURSE PRACTITIONER

## 2022-02-07 NOTE — PATIENT INSTRUCTIONS
Patient Education     Viral Upper Respiratory Illness (Adult)    You have a viral upper respiratory illness (URI). This illness is contagious during the first few days. It is spread through the air by coughing and sneezing. It may also be spread by direct contact (touching the sick person and then touching your own eyes, nose, or mouth). Frequent handwashing will decrease risk of spread. Most viral illnesses go away within 7 to 10 days with rest and simple home remedies. Sometimes the illness may last for several weeks. Antibiotics will not kill a virus, and they are generally not prescribed for this condition.  Home care    If symptoms are severe, rest at home for the first 2 to 3 days. When you resume activity, don't let yourself get too tired.    Don't smoke. If you need help stopping, talk with your healthcare provider.    Avoid being exposed to cigarette smoke (yours or others ).    You may use acetaminophen or ibuprofen to control pain and fever, unless another medicine was prescribed. If you have chronic liver or kidney disease, have ever had a stomach ulcer or gastrointestinal bleeding, or are taking blood-thinning medicines, talk with your healthcare provider before using these medicines. Aspirin should never be given to anyone under 18 years of age who is ill with a viral infection or fever. It may cause severe liver or brain damage.    Your appetite may be poor, so a light diet is fine. Stay well hydrated by drinking 6 to 8 glasses of fluids per day (water, soft drinks, juices, tea, or soup). Extra fluids will help loosen secretions in the nose and lungs.    Over-the-counter cold medicines will not shorten the length of time you re sick, but they may be helpful for the following symptoms: cough, sore throat, and nasal and sinus congestion. If you take prescription medicines, ask your healthcare provider or pharmacist which over-the-counter medicines are safe to use. (Note: Don't use decongestants if you  "have high blood pressure.)  Follow-up care  Follow up with your healthcare provider, or as advised.  When to seek medical advice  Call your healthcare provider right away if any of these occur:    Cough with lots of colored sputum (mucus)    Severe headache; face, neck, or ear pain    Difficulty swallowing due to throat pain    Fever of 100.4 F (38 C) or higher, or as directed by your healthcare provider  Call 911  Call 911 if any of these occur:    Chest pain, shortness of breath, wheezing, or difficulty breathing    Coughing up blood    Very severe pain with swallowing, especially if it goes along with a muffled voice   [employer] last reviewed this educational content on 6/1/2018 2000-2021 [employer]. All rights reserved. This information is not intended as a substitute for professional medical care. Always follow your healthcare professional's instructions.           Patient Education   After Your COVID-19 (Coronavirus) Test  You have been tested for COVID-19 (coronavirus).   If you'll have surgery in the next few days, we'll let you know ahead of time if you have the virus. Please call your surgeon's office with any questions.  For all other patients: Results are usually available in [employer] within 2 to 3 days.   If you do not have a [employer] account, you'll get a letter in the mail in about 7 to 10 days.   [facility] [employer] [facility] is often the fastest way to get test results. Please sign up if you do not already have a [employer] account. See the handout Getting COVID-19 Test Results in [employer] for help.  What if my test result is positive?  If your test is positive and you have not viewed your result in [facility] [employer] [facility], you'll get a phone call with your result. (A positive test means that you have the virus.)     Follow the tips under \"How do I self-isolate?\" below for 10 days (20 days if you have a weak immune system).    You don't need to be retested for COVID-19 before going back to school or work. As long as you're " "fever-free and feeling better, you can go back to school, work and other activities after waiting the 10 or 20 days.  What if I have questions after I get my results?  If you have questions about your results, please visit our testing website at [email or web address].   After 7 to 10 days, if you have not gotten your results:     Call [phone] ([phone]) and ask to speak with our COVID-19 results team.    If you're being treated at an infusion center: Call your infusion center directly.  What are the symptoms of COVID-19?  Cough, fever and trouble breathing are the most common signs of COVID-19.  Other symptoms can include new headaches, new muscle or body aches, new and unexplained fatigue (feeling very tired), chills, sore throat, congestion (stuffy or runny nose), diarrhea (loose poop), loss of taste or smell, belly pain, and nausea or vomiting (feeling sick to your stomach or throwing up).  You may already have symptoms of COVID-19, or they may show up later.  What should I do if I have symptoms?  If you're having surgery: Call your surgeon's office.  For all other patients: Stay home and away from others (self-isolate) until ...    You've had no fever--and no medicine that reduces fever--for 1 full day (24 hours), AND    Other symptoms have gotten better. For example, your cough or breathing has improved, AND    At least 10 days have passed since your symptoms first started.  How do I self-isolate?    Stay in your own room, even for meals. Use your own bathroom if you can.    Stay away from others in your home. No hugging, kissing or shaking hands. No visitors.    Don't go to work, school or anywhere else.    Clean \"high touch\" surfaces often (doorknobs, counters, handles). Use household cleaning spray or wipes. You'll find a full list of  on the EPA website: www.epa.gov/pesticide-registration/list-n-disinfectants-use-against-sars-cov-2.    Cover your mouth and " nose with a mask or other face covering to avoid spreading germs.    Wash your hands and face often. Use soap and water.    Caregivers in these groups are at risk for severe illness due to COVID-19:  ? People 65 years and older  ? People who live in a nursing home or long-term care facility  ? People with chronic disease (lung, heart, cancer, diabetes, kidney, liver, immunologic)  ? People who have a weakened immune system, including those who:    Are in cancer treatment    Take medicine that weakens the immune system, such as corticosteroids    Had a bone marrow or organ transplant    Have an immune deficiency    Have poorly controlled HIV or AIDS    Are obese (body mass index of 40 or higher)    Smoke regularly    Caregivers should wear gloves while washing dishes, handling laundry and cleaning bedrooms and bathrooms.    Use caution when washing and drying laundry: Don't shake dirty laundry and use the warmest water setting that you can.    For more tips on managing your health at home, go to www.cdc.gov/coronavirus/2019-ncov/downloads/10Things.pdf.  How can I take care of myself at home?  1. Get lots of rest. Drink extra fluids (unless a doctor has told you not to).  2. Take Tylenol (acetaminophen) for fever or pain. If you have liver or kidney problems, ask your family doctor if it's OK to take Tylenol.   Adults can take either:  ? 650 mg (two 325 mg pills) every 4 to 6 hours, or   ? 1,000 mg (two 500 mg pills) every 8 hours as needed.  ? Note: Don't take more than 3,000 mg in one day. Acetaminophen is found in many medicines (both prescribed and over-the-counter medicines). Read all labels to be sure you don't take too much.   For children, check the Tylenol bottle for the right dose. The dose is based on the child's age or weight.  3. If you have other health problems (like cancer, heart failure, an organ transplant or severe kidney disease): Call your specialty clinic if you don't feel better in the next 2  days.  4. Know when to call 911. Emergency warning signs include:  ? Trouble breathing or shortness of breath  ? Chest pain or pressure that doesn't go away  ? Feeling confused like you haven't felt before, or not being able to wake up  ? Bluish-colored lips or face  5. If your doctor prescribed a blood thinner medicine: Follow their instructions.  Where can I get more information?    [facility] - About COVID-19:   [email or web address]    CDC - If You're Sick: cdc.gov/coronavirus/2019-ncov/about/steps-when-sick.html    CDC - Ending Home Isolation: www.cdc.gov/coronavirus/2019-ncov/hcp/disposition-in-home-patients.html    CDC - Caring for Someone: www.cdc.gov/coronavirus/2019-ncov/if-you-are-sick/care-for-someone.html    [facility] - Interim Guidance for Hospital Discharge to Home: www.health.[facility]/diseases/coronavirus/hcp/hospdischarge.pdf    [facility] clinical trials (COVID-19 research studies): clinicalaffairs.[facility]-clinical-trials    Below are the COVID-19 hotlines at the [place] Department of Health ([facility]). Interpreters are available.  ? For health questions: Call 870-739-0829 or 1-107.708.8337 (7 a.m. to 7 p.m.)  ? For questions about schools and childcare: Call 934-209-3946 or 1-371.224.4577 (7 a.m. to 7 p.m.)    For informational purposes only. Not to replace the advice of your health care provider. Clinically reviewed by Infection Prevention and the [facility] COVID-19 Clinical Team. Copyright   2020 [employer]. All rights reserved. [employer] 109037 - Rev 11/11/20.

## 2022-02-07 NOTE — PROGRESS NOTES
Assessment & Plan     Viral URI    Cough    - Symptomatic; Unknown COVID-19 Virus (Coronavirus) by PCR Nose     Discussed symptoms likely viral in nature and antibiotic not indicated currently. COVID test in process, will notify if positive. Self-quarantine recommended. Recommend rest, fluids, tylenol as needed, humidifier, steam, nasal saline, Robitussin as needed.    Follow-up with PCP if symptoms persist for 7 days, and sooner if symptoms worsen or new symptoms develop.     Discussed red flag symptoms which warrant immediate visit in emergency room    All questions were answered and patient verbalized understanding. AVS reviewed with patient.     Anju Hoyt, DNP, APRN, CNP 2/7/2022 12:31 PM  [facility]          Paola Longoria is a 81 year old female who presents to clinic today for the following health issues:  Chief Complaint   Patient presents with     Cough     3 days mild cough/congestion     Patient presents for evaluation of cough. Associated symptoms: nasal congestion, fatigue. Cough is mild. Symptoms have been present for 3 days and have been stable. Denies fever, chills, shortness of breath, wheezing, headache, body aches. Cough hasn't been waking her at night. She has been taking Robitussin which helps temporarily. She has a history of HTN, TIA, CKD. She has been vaccinated for COVID and influenza.     Problem list, Medication list, Allergies, and Medical history reviewed in EPIC.    ROS:  Review of systems negative except for noted above        Objective    /62   Pulse 87   Temp 98  F (36.7  C) (Oral)   Wt 69.4 kg (153 lb)   SpO2 97%   BMI 28.91 kg/m    Physical Exam  Constitutional:       General: She is not in acute distress.     Appearance: She is not toxic-appearing or diaphoretic.   HENT:      Head: Normocephalic and atraumatic.      Right Ear: Tympanic membrane, ear canal and external ear normal.      Left Ear: Tympanic membrane, ear canal and  external ear normal.      Ears:      Comments: Cerumen bilateral ear canals, not impacted     Nose:      Right Sinus: No maxillary sinus tenderness or frontal sinus tenderness.      Left Sinus: No maxillary sinus tenderness or frontal sinus tenderness.      Comments: Mild nasal congestion     Mouth/Throat:      Mouth: Mucous membranes are moist.      Pharynx: Oropharynx is clear. No oropharyngeal exudate or posterior oropharyngeal erythema.      Tonsils: No tonsillar abscesses. 0 on the right. 0 on the left.   Eyes:      Conjunctiva/sclera: Conjunctivae normal.   Cardiovascular:      Rate and Rhythm: Normal rate and regular rhythm.      Heart sounds: Normal heart sounds.   Pulmonary:      Effort: Pulmonary effort is normal. No respiratory distress.      Breath sounds: Normal breath sounds. No wheezing, rhonchi or rales.   Lymphadenopathy:      Cervical: No cervical adenopathy.   Skin:     General: Skin is warm and dry.   Neurological:      Mental Status: She is alert.

## 2022-02-08 ENCOUNTER — TELEPHONE (OUTPATIENT)
Dept: NURSING | Facility: CLINIC | Age: 82
End: 2022-02-08
Payer: COMMERCIAL

## 2022-02-08 LAB
SARS-COV-2 RNA RESP QL NAA+PROBE: DETECTED
SARS-COV-2 RNA RESP QL NAA+PROBE: NORMAL

## 2022-02-09 NOTE — TELEPHONE ENCOUNTER
Patient classified as COVID treatment eligible by Epic high risk algorithm:  Yes    Coronavirus (COVID-19) Notification    Reason for call  Notify of POSITIVE COVID-19 lab result, assess symptoms,  review [facility] recommendations    Lab Result   Lab test for 2019-nCoV rRt-PCR or SARS-COV-2 PCR  Oropharyngeal AND/OR nasopharyngeal swabs were POSITIVE for 2019-nCoV RNA [OR] SARS-COV-2 RNA (COVID-19) RNA     We have been unable to reach patient by phone at this time to notify of their Positive COVID-19 result.    Left voicemail message requesting a call back to [phone] [facility] for results.        A Positive COVID-19 letter will be sent via [employer] or the mail. (Exception, no letters sent to Presurgerical/Preprocedure Patients)    Nini Beaver LPN

## 2022-02-09 NOTE — TELEPHONE ENCOUNTER
"Coronavirus (COVID-19) Notification    Caller Name (Patient, parent, daughter/son, grandparent, etc)  Patient    Reason for call  Notify of Positive Coronavirus (COVID-19) lab results, assess symptoms,  review  [facility] recommendations    Lab Result    Lab test:  2019-nCoV rRt-PCR or SARS-CoV-2 PCR    Oropharyngeal AND/OR nasopharyngeal swabs is POSITIVE for 2019-nCoV RNA/SARS-COV-2 PCR (COVID-19 virus)    RN Recommendations/Instructions per [facility] Coronavirus COVID-19 recommendations    Brief introduction script  Introduce self then review script:  \"I am calling on behalf of [email or web address].  We were notified that your Coronavirus test (COVID-19) for was POSITIVE for the virus.  I have some information to relay to you but first I wanted to mention that the [place] Dept of Health will be contacting you shortly [it's possible MD already called Patient] to talk to you more about how you are feeling and other people you have had contact with who might now also have the virus.  Also,  [facility] is Partnering with the [facility] for Covid-19 research, you may be contacted directly by research staff.\"      Assessment (Inquire about Patient's current symptoms)   Assessment   Current Symptoms at time of phone call: (if no symptoms, document No symptoms] Cough occ \"little cold\"   Date of symptom(s) onset (if applicable) 2/4/22     If at time of call, Patients symptoms hare worsened, the Patient should contact 911 or have someone drive them to Emergency Dept promptly:      If Patient calling 911, inform 911 personal that you have tested positive for the Coronavirus (COVID-19).  Place mask on and await 911 to arrive.    If Emergency Dept, If possible, please have another adult drive you to the Emergency Dept but you need to wear mask when in contact with other people.          Treatment Options:   Patient classified as COVID treatment eligible by Epic high risk algorithm: Yes  Is the patient " symptomatic at the time of result notification? Yes. Was the onset of symptoms within the last 5 days? No. You may be eligible to receive a new treatment with a monoclonal antibody for preventing hospitalization in patients at high risk for complications from COVID-19.   This medication is still experimental and available on a limited basis; it is given through an IV and must be given at an infusion center. Please note that not all people who are eligible will receive the medication since it is in limited supply.  Are you interested in being considered for this medication?  No.  Reason patient declined:  Not that sick and don't think I will get worse (save for people who possibly need it more)    Review information with Patient    Your result was positive. This means you have COVID-19 (coronavirus).  We have sent you a letter that reviews the information that I'll be reviewing with you now.    How can I protect others?    If you have symptoms: stay home and away from others (self-isolate) until:    You've had no fever--and no medicine that reduces fever--for 1 full day (24 hours). And       Your other symptoms have gotten better. For example, your cough or breathing has improved. And     At least 10 days have passed since your symptoms started. (If you've been told by a doctor that you have a weak immune system, wait 20 days.)     If you don't have symptoms: Stay home and away from others (self-isolate) until at least 10 days have passed since your first positive COVID-19 test. (Date test collected)    During this time:    Stay in your own room, including for meals. Use your own bathroom if you can.    Stay away from others in your home. No hugging, kissing or shaking hands. No visitors.     Don't go to work, school or anywhere else.     Clean  high touch  surfaces often (doorknobs, counters, handles, etc.). Use a household cleaning spray or wipes. You'll find a full list on the EPA website at  www.epa.gov/pesticide-registration/list-n-disinfectants-use-against-sars-cov-2.     Cover your mouth and nose with a mask, tissue or other face covering to avoid spreading germs.    Wash your hands and face often with soap and water.    Make a list of people you have been in close contact with recently, even if either of you wore a face covering.   - Start your list from 2 days before you became ill or had a positive test.  - Include anyone that was within 6 feet of you for a cumulative total of 15 minutes or more in 24 hours. (Example: if you sat next to Castillo for 5 minutes in the morning and 10 minutes in the afternoon, then you were in close contact for 15 minutes total that day. Castillo would be added to your list.)    A public health worker will call or text you. It is important that you answer. They will ask you questions about possible exposures to COVID-19, such as people you have been in direct contact with and places you have visited.    Tell the people on your list that you have COVID-19; they should stay away from others for 14 days starting from the last time they were in contact with you (unless you are told something different from a public health worker).     Caregivers in these groups are at risk for severe illness due to COVID-19:  o People 65 years and older  o People who live in a nursing home or long-term care facility  o People with chronic disease (lung, heart, cancer, diabetes, kidney, liver, immunologic)  o People who have a weakened immune system, including those who:  - Are in cancer treatment  - Take medicine that weakens the immune system, such as corticosteroids  - Had a bone marrow or organ transplant  - Have an immune deficiency  - Have poorly controlled HIV or AIDS  - Are obese (body mass index of 40 or higher)  - Smoke regularly    Caregivers should wear gloves while washing dishes, handling laundry and cleaning bedrooms and bathrooms.    Wash and dry laundry with special caution. Don't  shake dirty laundry, and use the warmest water setting you can.    If you have a weakened immune system, ask your doctor about other actions you should take.    For more tips, go to www.cdc.gov/coronavirus/2019-ncov/downloads/10Things.pdf.    You should not go back to work until you meet the guidelines above for ending your home isolation. You don't need to be retested for COVID-19 before going back to work--studies show that you won't spread the virus if it's been at least 10 days since your symptoms started (or 20 days, if you have a weak immune system).    Employers: This document serves as formal notice of your employee's medical guidelines for going back to work. They must meet the above guidelines before going back to work in person.    How can I take care of myself?    1. Get lots of rest. Drink extra fluids (unless a doctor has told you not to).    2. Take Tylenol (acetaminophen) for fever or pain. If you have liver or kidney problems, ask your family doctor if it's okay to take Tylenol.     Take either:     650 mg (two 325 mg pills) every 4 to 6 hours, or     1,000 mg (two 500 mg pills) every 8 hours as needed.     Note: Don't take more than 3,000 mg in one day. Acetaminophen is found in many medicines (both prescribed and over-the-counter medicines). Read all labels to be sure you don't take too much.    For children, check the Tylenol bottle for the right dose (based on their age or weight).    3. If you have other health problems (like cancer, heart failure, an organ transplant or severe kidney disease): Call your specialty clinic if you don't feel better in the next 2 days.    4. Know when to call 911: Emergency warning signs include:    Trouble breathing or shortness of breath    Pain or pressure in the chest that doesn't go away    Feeling confused like you haven't felt before, or not being able to wake up    Bluish-colored lips or face    5. Sign up for [employer]. We know it's scary to hear that  you have COVID-19. We want to track your symptoms to make sure you're okay over the next 2 weeks. Please look for an email from [employer]--this is a free, online program that we'll use to keep in touch. To sign up, follow the link in the email. Learn more at [email or web address].    Where can I get more information?    [facility]: [email or web address]    Coronavirus Basics: www.health.[facility].[place]./diseases/coronavirus/basics.html    What to Do If You're Sick: www.cdc.gov/coronavirus/2019-ncov/about/steps-when-sick.html    Ending Home Isolation: www.cdc.gov/coronavirus/2019-ncov/hcp/disposition-in-home-patients.html     Caring for Someone with COVID-19: www.cdc.gov/coronavirus/2019-ncov/if-you-are-sick/care-for-someone.html     [facility] clinical trials (COVID-19 research studies): clinicalaffairs.[facility]-clinical-trials     A Positive COVID-19 letter will be sent via [employer] or the mail. (Exception, no letters sent to Presurgerical/Preprocedure Patients)    Arabella Ferreira LPN

## 2022-02-10 DIAGNOSIS — I10 HYPERTENSION GOAL BP (BLOOD PRESSURE) < 150/90: Chronic | ICD-10-CM

## 2022-02-10 NOTE — TELEPHONE ENCOUNTER
"Routing refill request to provider for review/approval because:  Requested Prescriptions   Pending Prescriptions Disp Refills    lisinopril-hydrochlorothiazide (ZESTORETIC) 10-12.5 MG tablet 90 tablet 0     Sig: Take 1 tablet by mouth daily        Diuretics (Including Combos) Protocol Failed - 2/10/2022 12:21 PM        Failed - Normal serum creatinine on file in past 12 months       Recent Labs   Lab Test 07/09/21  0911   CR 1.16*              Passed - Blood pressure under 140/90 in past 12 months       BP Readings from Last 3 Encounters:   02/07/22 115/62   10/28/21 122/70   09/16/21 124/72                 Passed - Recent (12 mo) or future (30 days) visit within the authorizing provider's specialty     Patient has had an office visit with the authorizing provider or a provider within the authorizing providers department within the previous 12 mos or has a future within next 30 days. See \"Patient Info\" tab in inbasket, or \"Choose Columns\" in Meds & Orders section of the refill encounter.              Passed - Medication is active on med list        Passed - Patient is age 18 or older        Passed - No active pregancy on record        Passed - Normal serum potassium on file in past 12 months       Recent Labs   Lab Test 07/09/21  0911   POTASSIUM 3.9                    Passed - Normal serum sodium on file in past 12 months       Recent Labs   Lab Test 07/09/21  0911                 Passed - No positive pregnancy test in past 12 months       ACE Inhibitors (Including Combos) Protocol Failed - 2/10/2022 12:21 PM        Failed - Normal serum creatinine on file in past 12 months     Recent Labs   Lab Test 07/09/21  0911   CR 1.16*       Ok to refill medication if creatinine is low          Passed - Blood pressure under 140/90 in past 12 months       BP Readings from Last 3 Encounters:   02/07/22 115/62   10/28/21 122/70   09/16/21 124/72                 Passed - Recent (12 mo) or future (30 days) visit within the " "authorizing provider's specialty     Patient has had an office visit with the authorizing provider or a provider within the authorizing providers department within the previous 12 mos or has a future within next 30 days. See \"Patient Info\" tab in inbasket, or \"Choose Columns\" in Meds & Orders section of the refill encounter.              Passed - Medication is active on med list        Passed - Patient is age 18 or older        Passed - No active pregnancy on record        Passed - Normal serum potassium on file in past 12 months     Recent Labs   Lab Test 07/09/21  0911   POTASSIUM 3.9               Passed - No positive pregnancy test within past 12 months                Rachel ROMERON, RN        "

## 2022-02-11 RX ORDER — LISINOPRIL/HYDROCHLOROTHIAZIDE 10-12.5 MG
1 TABLET ORAL DAILY
Qty: 90 TABLET | Refills: 0 | Status: SHIPPED | OUTPATIENT
Start: 2022-02-11 | End: 2022-04-13

## 2022-02-14 ENCOUNTER — OFFICE VISIT (OUTPATIENT)
Dept: OPTOMETRY | Facility: CLINIC | Age: 82
End: 2022-02-14
Payer: COMMERCIAL

## 2022-02-14 DIAGNOSIS — H52.13 MYOPIA OF BOTH EYES: ICD-10-CM

## 2022-02-14 DIAGNOSIS — H52.223 REGULAR ASTIGMATISM OF BOTH EYES: ICD-10-CM

## 2022-02-14 DIAGNOSIS — Z96.1 PSEUDOPHAKIA, BOTH EYES: Primary | ICD-10-CM

## 2022-02-14 DIAGNOSIS — H52.4 PRESBYOPIA: ICD-10-CM

## 2022-02-14 DIAGNOSIS — H18.513 FUCHS' CORNEAL DYSTROPHY OF BOTH EYES: ICD-10-CM

## 2022-02-14 DIAGNOSIS — H04.123 DRY EYES, BILATERAL: ICD-10-CM

## 2022-02-14 PROCEDURE — 92015 DETERMINE REFRACTIVE STATE: CPT | Performed by: OPTOMETRIST

## 2022-02-14 PROCEDURE — 92004 COMPRE OPH EXAM NEW PT 1/>: CPT | Performed by: OPTOMETRIST

## 2022-02-14 ASSESSMENT — REFRACTION_MANIFEST
OD_AXIS: 005
OD_SPHERE: -3.75
OS_AXIS: 005
OS_CYLINDER: +2.50
METHOD_AUTOREFRACTION: 1
OS_CYLINDER: +7.25
OS_SPHERE: -3.75
OS_SPHERE: -2.25
OS_AXIS: 166
OD_SPHERE: -3.75
OD_ADD: +2.50
OD_CYLINDER: +3.25
OD_AXIS: 002
OS_ADD: +2.50
OD_CYLINDER: +2.25

## 2022-02-14 ASSESSMENT — VISUAL ACUITY
OS_PH_CC+: -2
METHOD: SNELLEN - LINEAR
CORRECTION_TYPE: GLASSES
OD_CC: 20/30-3
OS_CC: 20/100-1
OS_CC: 20/100
OS_CC+: -2
OS_PH_CC: 20/70
OD_CC: 20/25-1

## 2022-02-14 ASSESSMENT — REFRACTION_WEARINGRX
OD_SPHERE: -3.25
OS_ADD: +2.59
SPECS_TYPE: PAL
OS_CYLINDER: +2.50
OD_AXIS: 180
OD_CYLINDER: +2.50
OD_ADD: +2.58
OS_SPHERE: -1.25
OS_AXIS: 168

## 2022-02-14 ASSESSMENT — CUP TO DISC RATIO
OD_RATIO: 0.3
OS_RATIO: 0.3

## 2022-02-14 ASSESSMENT — KERATOMETRY
OD_AXISANGLE2_DEGREES: 177
OD_K1POWER_DIOPTERS: 46.75
OS_K1POWER_DIOPTERS: 46.50
OS_K2POWER_DIOPTERS: 42.50
OS_AXISANGLE2_DEGREES: 164
OD_K2POWER_DIOPTERS: 44.50

## 2022-02-14 ASSESSMENT — CONF VISUAL FIELD
OS_SUPERIOR_TEMPORAL_RESTRICTION: 2
OS_INFERIOR_TEMPORAL_RESTRICTION: 2
OD_NORMAL: 1

## 2022-02-14 ASSESSMENT — TONOMETRY
IOP_METHOD: APPLANATION
OD_IOP_MMHG: 20
OS_IOP_MMHG: 20
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
## 2022-02-14 NOTE — PATIENT INSTRUCTIONS
Fill glasses prescription  Allow 2 weeks to adapt to change in glasses  Return to clinic to verify glasses and for glasses check as needed.   Use over the counter artificial tears 3 times a day ( Thera Tears Extra)    Discontinue  Allergy drops for 1 week.  Bring bottle next time.  Could purchase Alaway or Zaditor  (or their generic , over the counter  allergy drops if you need more   Return in 1 year for eye exam  Return to clinic as needed in 3-4 weeks if comfort does not improve   DMV form completed will FAX and mail copy to patient   Candida Sutton, OD  [phone]
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
## 2022-02-15 NOTE — PROGRESS NOTES
"[facility]  Section of General Neurology  New Patient Visit      Swati Marinelli MRN# [id number]   Age: 81 year old YOB: 1940     Requesting physician: Leeanne Vegas     Reason for Consultation: dizziness        History of Presenting Symptoms:   Swati Marinelli is a 81 year old female who presents today for evaluation of dizziness.  She has had this for a long time (several years)  It is often a daily occurance    Per Dr. Blanton note:  \"Has a history of veritgo      For years now has had dizziness where if patient lays on her left side will hve a dizzy spell   Has seen primary care provider and was prescribed with meclizine and has helped     Patient is flying to [facility].. and is worried the flight would make her more off balance   Description: feels like she's going to fall (not faint- patient clarified) tipping over like shes drunk \"    Pertinent meds include celexa, plavix, meclizine PRN    Dizziness specific questions:  Use a word other than dizziness to describe the sensation: Feels like her head isn't all there.  Lightheadedness/presyncopal feeling? Yes  Room spinning?  Yes   Instability/unsteadiness? Yes  Duration of episodes? (less than 1 minute can be suggestive of BPPV) Meclizine does help.   Would last for days otherwise.  Meclizine doesn't stop it but does get better.  Is mild/has to deal with it.    Triggers: (head movements e.g.)  Bending down to pick things up can worsen this.  Has never passed out.    Does not feel light headed when she stands up  History of migraine headaches? No clear history  Hearing loss (? Of Meniere)--has hearing aids b/l   Infection prior to symptoms? (vestibular neuritis) not clear  History of mood disorder (panic disorder, consideration of chronic subjective dizziness/persistent postural perceptual dizziness) --pretty easy going person.    Sleep: sleeps well, sleeps on R side because head to the left can make it " worse--doesn't use CPAP any more.  Doesn't feel is related.   Flights do bother her a little bit    Does note poor hydration.   No recurrence of symptoms from 2010 TIA.        She is retired homemaker  She lives in [place].   She does not drive.   She has cut out extra salt, is adament she get less, she notes, for her health.        Past Medical History:     Patient Active Problem List   Diagnosis     Advanced directives, counseling/discussion     Hypertension goal BP (blood pressure) < 150/90     Neurocardiogenic syncope     Mixed hyperlipidemia     MANNY (obstructive sleep apnea)- 'moderate' (AHI 19)     Nonrheumatic aortic valve insufficiency     Anxiety     Gastroesophageal reflux disease without esophagitis     Sicca syndrome (H)     ANH positive     Primary osteoarthritis involving multiple joints     Malignant neoplasm of skin of face     CKD (chronic kidney disease) stage 3, GFR 30-59 ml/min (H)     Shoulder pain, bilateral     Past Medical History:   Diagnosis Date     GERD (gastroesophageal reflux disease)      Hypertension goal BP (blood pressure) < 150/90 11/14/2017     Mixed hyperlipidemia 11/14/2017     MANNY (obstructive sleep apnea)- 'moderate' (AHI 19) 11/14/2017    Initial diagnosis ?[address] [place] [address].  Study Date: 8/17/2018- (154.0 lbs) It was frequently difficult to discern between RERAs and PLMs. The combined apnea/hypopnea index was 19.9 events per hour (central apnea/hypopnea index was 0.5 events per hour). The REM AHI was 74.4 events per hour. The supine AHI was n/a.  RDI was 49.0 events per hour. Baseline oxygen saturation was 91.6%. Lowest oxygen saturatio     TIA (transient ischemic attack) 2010    Possible TIA approximately 2010. Occular symptoms in left eye, likely occipital         Past Surgical History:     Past Surgical History:   Procedure Laterality Date     APPENDECTOMY OPEN CHILD  1951     CARPAL TUNNEL RELEASE RT/LT Right 1995 1990s     CATARACT IOL, RT/LT  2009    right 2/4/09, left  2/21/09     CHOLECYSTECTOMY  06/2012     COLONOSCOPY  2012    normal     MOHS MICROGRAPHIC PROCEDURE  2011    back - 2011, lip and back 5/2012     ROTATOR CUFF REPAIR RT/LT Right 2009     ROTATOR CUFF REPAIR RT/LT Left 04/20/2016     TONSILLECTOMY  1945     ZZC TOTAL KNEE ARTHROPLASTY Left 2011     ZZC TOTAL KNEE ARTHROPLASTY Left 2013    revision        Social History:     Social History     Tobacco Use     Smoking status: Never Smoker     Smokeless tobacco: Never Used   Substance Use Topics     Alcohol use: No     Drug use: No        Family History:     Family History   Problem Relation Age of Onset     Tuberculosis Mother      Myocardial Infarction Father      Coronary Artery Disease Father      Myocardial Infarction Brother      Coronary Artery Disease Brother      Heart Surgery Daughter      Diabetes No family hx of      Colon Cancer No family hx of      Breast Cancer No family hx of      Glaucoma No family hx of      Macular Degeneration No family hx of         Medications:     Current Outpatient Medications   Medication Sig     citalopram (CELEXA) 20 MG tablet TAKE 1 AND 1/2 TABLETS BY  MOUTH DAILY     clopidogrel (PLAVIX) 75 MG tablet TAKE 1 TABLET (75 MG) BY MOUTH DAILY     lisinopril-hydrochlorothiazide (ZESTORETIC) 10-12.5 MG tablet Take 1 tablet by mouth daily Needs to be seen for further refills, needs to establish care with new primary physician     loratadine (CLARITIN) 10 MG tablet Take 1 tablet (10 mg) by mouth daily     meclizine (ANTIVERT) 25 MG tablet Take 1 tablet (25 mg) by mouth every 6 hours as needed for dizziness     order for DME autoCPAP 7-13 cmH20     pantoprazole (PROTONIX) 40 MG EC tablet Take 1 tablet (40 mg) by mouth daily     potassium chloride ER (KLOR-CON) 20 MEQ CR tablet TAKE 1 TABLET EVERY DAY     rosuvastatin (CRESTOR) 5 MG tablet TAKE 1 TABLET EVERY DAY     verapamil ER (CALAN-SR) 180 MG CR tablet Take 1 tablet (180 mg) by mouth 2 times daily     Current Facility-Administered  "Medications   Medication     lidocaine (PF) (XYLOCAINE) 1 % injection 2 mL     lidocaine 1 % injection 1 mL     lidocaine 1 % injection 1 mL     lidocaine 1 % injection 3 mL     lidocaine 1 % injection 3 mL     methylPREDNISolone (DEPO-MEDROL) injection 80 mg     methylPREDNISolone (DEPO-MEDROL) injection 80 mg     methylPREDNISolone (DEPO-MEDROL) injection 80 mg     methylPREDNISolone (DEPO-MEDROL) injection 80 mg     methylPREDNISolone (DEPO-MEDROL) injection 80 mg     methylPREDNISolone (DEPO-MEDROL) injection 80 mg        Allergies:     Allergies   Allergen Reactions     Aleve [Naproxen]      Asa [Aspirin]      Codeine      Doxycycline      Elavil [Amitriptyline]      Etodolac      Gabapentin      Lyrica [Pregabalin]      [place] Other (See Comments)     Mushroom      Oxycodone      Oxycodone-Acetaminophen      Vicodin [Hydrocodone-Acetaminophen]         Review of Systems:   As noted above     Physical Exam:   Vitals: /72   Pulse 80   Ht 1.549 m (5' 1\")   Wt 69.4 kg (153 lb)   SpO2 95%   BMI 28.91 kg/m     CV: peripheral pulse appreciated  Lungs: breathing comfortably  Extremities: no edema    Neuro:   General Appearance: No apparent distress, well-nourished, well-groomed, pleasant     Mental Status: Alert and oriented to person, place, and time. Speech fluent and comprehension intact. No dysarthria. Normal memory, fund of knowledge, attention/concentration, and language    Cranial Nerves:   II: Visual fields: has trouble with left visual field in probably both eyes but she feels worse left eye, long standing since early 20s, she was told this was due to a \"mashed artery\" previously  III: Pupils: 3 mm, equal, round, reactive to light   III,IV,VI: Extraocular Movements: intact   V: Facial sensation: intact to light touch  VII: Facial strength: intact without asymmetry  VIII: Hearing: intact grossly  IX: Palate: intact   XI: Shoulder shrug: intact  XII: Tongue movement: normal    Head impulse test " negative     Motor Exam:   5/5 Diffusely    Sensory: intact to light touch, vibration    Coordination: no dysmetria with finger-to-nose bilaterally    Reflexes: biceps, triceps, brachioradialis, patellar, and ankle jerks 1+ and symmetric.     Gait: normal casual gait, normal stride length         Data: Pertinent prior to visit   Imaging:  none    Laboratory:  Last Comprehensive Metabolic Panel:  Sodium   Date Value Ref Range Status   07/09/2021 138 133 - 144 mmol/L Final     Potassium   Date Value Ref Range Status   07/09/2021 3.9 3.4 - 5.3 mmol/L Final     Chloride   Date Value Ref Range Status   07/09/2021 102 94 - 109 mmol/L Final     Carbon Dioxide   Date Value Ref Range Status   07/09/2021 30 20 - 32 mmol/L Final     Anion Gap   Date Value Ref Range Status   07/09/2021 6 3 - 14 mmol/L Final     Glucose   Date Value Ref Range Status   07/09/2021 87 70 - 99 mg/dL Final     Comment:     Fasting specimen     Urea Nitrogen   Date Value Ref Range Status   07/09/2021 23 7 - 30 mg/dL Final     Creatinine   Date Value Ref Range Status   07/09/2021 1.16 (H) 0.52 - 1.04 mg/dL Final     GFR Estimate   Date Value Ref Range Status   07/09/2021 44 (L) >60 mL/min/[1.73_m2] Final     Comment:     Non  GFR Calc  Starting 12/18/2018, serum creatinine based estimated GFR (eGFR) will be   calculated using the Chronic Kidney Disease Epidemiology Collaboration   (CKD-EPI) equation.       Calcium   Date Value Ref Range Status   07/09/2021 9.5 8.5 - 10.1 mg/dL Final     Bilirubin Total   Date Value Ref Range Status   07/09/2021 0.7 0.2 - 1.3 mg/dL Final     Alkaline Phosphatase   Date Value Ref Range Status   07/09/2021 54 40 - 150 U/L Final     ALT   Date Value Ref Range Status   07/09/2021 35 0 - 50 U/L Final     AST   Date Value Ref Range Status   07/09/2021 20 0 - 45 U/L Final             Lab Results   Component Value Date    WBC 7.1 07/09/2021     Lab Results   Component Value Date    RBC 4.75 07/09/2021     Lab  Results   Component Value Date    HGB 14.1 07/09/2021     Lab Results   Component Value Date    HCT 43.0 07/09/2021     No components found for: MCT  Lab Results   Component Value Date    MCV 91 07/09/2021     Lab Results   Component Value Date    MCH 29.7 07/09/2021     Lab Results   Component Value Date    MCHC 32.8 07/09/2021     Lab Results   Component Value Date    RDW 13.4 07/09/2021     Lab Results   Component Value Date     07/09/2021              Assessment and Plan:   Assessment:  Swati Marinelli is a 81 year old female who presents today for evaluation of dizziness.  She has had this for a long time.  To history and exam it appears multifactorial.  She does have elements of a more peripheral vertigo in when she rolls over in bed it can be aggravated but also she does have elements of lightheadedness likely due to poor hydration and lack of salt intake as well, which we discussed.  Her long standing visual field issues are not clear to me regarding if they are related.  She was told she had a previous arterial issue related to this.  Neurological examination as above is non focal with exception of known visual issue.       Plan:  --Vestibular PT discussed, she would like to wait as she previously has tried this with limited success  --She will work on hydration, slightly more salt intake as well  --MRI, MRA head without contrast to further work up non specific dizziness, further understand longstanding visual issues will choose this over CTA given elevated creatinine previously  --Will call her with results, she can keep me posted in the mean time as well with her progress. Will formally follow up in 3-4 months.             Amish Rodriguez MD   of Neurology   [facility]      The total time of this encounter today amounted to 52 minutes. This time included time spent with the patient, prep work, ordering tests, and performing post visit documentation.

## 2022-02-16 ENCOUNTER — OFFICE VISIT (OUTPATIENT)
Dept: NEUROLOGY | Facility: CLINIC | Age: 82
End: 2022-02-16
Attending: FAMILY MEDICINE
Payer: COMMERCIAL

## 2022-02-16 VITALS
DIASTOLIC BLOOD PRESSURE: 72 MMHG | HEART RATE: 80 BPM | HEIGHT: 61 IN | OXYGEN SATURATION: 95 % | WEIGHT: 153 LBS | SYSTOLIC BLOOD PRESSURE: 118 MMHG | BODY MASS INDEX: 28.89 KG/M2

## 2022-02-16 DIAGNOSIS — H91.93 BILATERAL HEARING LOSS, UNSPECIFIED HEARING LOSS TYPE: ICD-10-CM

## 2022-02-16 DIAGNOSIS — H53.452 LOSS OF PERIPHERAL VISUAL FIELD, LEFT: Primary | ICD-10-CM

## 2022-02-16 DIAGNOSIS — R42 DIZZINESS: ICD-10-CM

## 2022-02-16 PROCEDURE — 99215 OFFICE O/P EST HI 40 MIN: CPT | Performed by: STUDENT IN AN ORGANIZED HEALTH CARE EDUCATION/TRAINING PROGRAM

## 2022-02-16 RX ORDER — MECLIZINE HYDROCHLORIDE 25 MG/1
25 TABLET ORAL EVERY 6 HOURS PRN
Qty: 90 TABLET | Refills: 3 | Status: SHIPPED | OUTPATIENT
Start: 2022-02-16

## 2022-02-16 ASSESSMENT — EXTERNAL EXAM - LEFT EYE: OS_EXAM: NORMAL

## 2022-02-16 ASSESSMENT — PAIN SCALES - GENERAL: PAINLEVEL: NO PAIN (0)

## 2022-02-16 ASSESSMENT — SLIT LAMP EXAM - LIDS
COMMENTS: 2+ DERMATOCHALASIS
COMMENTS: 2+ DERMATOCHALASIS

## 2022-02-16 ASSESSMENT — EXTERNAL EXAM - RIGHT EYE: OD_EXAM: NORMAL

## 2022-02-16 NOTE — PATIENT INSTRUCTIONS
Add extra salt, water to your diet.   Vestibular physical therapy: you have tried in the past  Continue the meclizine as needed  CTA head and neck to exclude a vascular cause

## 2022-02-16 NOTE — LETTER
"    2/16/2022         RE: Swati Marinelli  [address]  [place] [address]        Dear Colleague,    Thank you for referring your patient, Swati Marinelli, to the [facility] [place]. Please see a copy of my visit note below.    [facility]  Section of General Neurology  New Patient Visit      Swati Marinelli MRN# [id number]   Age: 81 year old YOB: 1940     Requesting physician: Leeanne Vegas     Reason for Consultation: dizziness        History of Presenting Symptoms:   Swati Marinelli is a 81 year old female who presents today for evaluation of dizziness.  She has had this for a long time (several years)  It is often a daily occurance    Per Dr. Blanton note:  \"Has a history of veritgo      For years now has had dizziness where if patient lays on her left side will hve a dizzy spell   Has seen primary care provider and was prescribed with meclizine and has helped     Patient is flying to [facility].. and is worried the flight would make her more off balance   Description: feels like she's going to fall (not faint- patient clarified) tipping over like shes drunk \"    Pertinent meds include celexa, plavix, meclizine PRN    Dizziness specific questions:  Use a word other than dizziness to describe the sensation: Feels like her head isn't all there.  Lightheadedness/presyncopal feeling? Yes  Room spinning?  Yes   Instability/unsteadiness? Yes  Duration of episodes? (less than 1 minute can be suggestive of BPPV) Meclizine does help.   Would last for days otherwise.  Meclizine doesn't stop it but does get better.  Is mild/has to deal with it.    Triggers: (head movements e.g.)  Bending down to pick things up can worsen this.  Has never passed out.    Does not feel light headed when she stands up  History of migraine headaches? No clear history  Hearing loss (? Of Meniere)--has hearing aids b/l   Infection prior to symptoms? " (vestibular neuritis) not clear  History of mood disorder (panic disorder, consideration of chronic subjective dizziness/persistent postural perceptual dizziness) --pretty easy going person.    Sleep: sleeps well, sleeps on R side because head to the left can make it worse--doesn't use CPAP any more.  Doesn't feel is related.   Flights do bother her a little bit    Does note poor hydration.   No recurrence of symptoms from 2010 TIA.        She is retired homemaker  She lives in [place].   She does not drive.   She has cut out extra salt, is adament she get less, she notes, for her health.        Past Medical History:     Patient Active Problem List   Diagnosis     Advanced directives, counseling/discussion     Hypertension goal BP (blood pressure) < 150/90     Neurocardiogenic syncope     Mixed hyperlipidemia     MANNY (obstructive sleep apnea)- 'moderate' (AHI 19)     Nonrheumatic aortic valve insufficiency     Anxiety     Gastroesophageal reflux disease without esophagitis     Sicca syndrome (H)     ANH positive     Primary osteoarthritis involving multiple joints     Malignant neoplasm of skin of face     CKD (chronic kidney disease) stage 3, GFR 30-59 ml/min (H)     Shoulder pain, bilateral     Past Medical History:   Diagnosis Date     GERD (gastroesophageal reflux disease)      Hypertension goal BP (blood pressure) < 150/90 11/14/2017     Mixed hyperlipidemia 11/14/2017     MANNY (obstructive sleep apnea)- 'moderate' (AHI 19) 11/14/2017    Initial diagnosis ?[facility], [place].  Study Date: 8/17/2018- (154.0 lbs) It was frequently difficult to discern between RERAs and PLMs. The combined apnea/hypopnea index was 19.9 events per hour (central apnea/hypopnea index was 0.5 events per hour). The REM AHI was 74.4 events per hour. The supine AHI was n/a.  RDI was 49.0 events per hour. Baseline oxygen saturation was 91.6%. Lowest oxygen saturatio     TIA (transient ischemic attack) 2010    Possible TIA approximately 2010.  Occular symptoms in left eye, likely occipital         Past Surgical History:     Past Surgical History:   Procedure Laterality Date     APPENDECTOMY OPEN CHILD  1951     CARPAL TUNNEL RELEASE RT/LT Right 1995 1990s     CATARACT IOL, RT/LT  2009    right 2/4/09, left 2/21/09     CHOLECYSTECTOMY  06/2012     COLONOSCOPY  2012    normal     MOHS MICROGRAPHIC PROCEDURE  2011    back - 2011, lip and back 5/2012     ROTATOR CUFF REPAIR RT/LT Right 2009     ROTATOR CUFF REPAIR RT/LT Left 04/20/2016     TONSILLECTOMY  1945     ZZC TOTAL KNEE ARTHROPLASTY Left 2011     ZZC TOTAL KNEE ARTHROPLASTY Left 2013    revision        Social History:     Social History     Tobacco Use     Smoking status: Never Smoker     Smokeless tobacco: Never Used   Substance Use Topics     Alcohol use: No     Drug use: No        Family History:     Family History   Problem Relation Age of Onset     Tuberculosis Mother      Myocardial Infarction Father      Coronary Artery Disease Father      Myocardial Infarction Brother      Coronary Artery Disease Brother      Heart Surgery Daughter      Diabetes No family hx of      Colon Cancer No family hx of      Breast Cancer No family hx of      Glaucoma No family hx of      Macular Degeneration No family hx of         Medications:     Current Outpatient Medications   Medication Sig     citalopram (CELEXA) 20 MG tablet TAKE 1 AND 1/2 TABLETS BY  MOUTH DAILY     clopidogrel (PLAVIX) 75 MG tablet TAKE 1 TABLET (75 MG) BY MOUTH DAILY     lisinopril-hydrochlorothiazide (ZESTORETIC) 10-12.5 MG tablet Take 1 tablet by mouth daily Needs to be seen for further refills, needs to establish care with new primary physician     loratadine (CLARITIN) 10 MG tablet Take 1 tablet (10 mg) by mouth daily     meclizine (ANTIVERT) 25 MG tablet Take 1 tablet (25 mg) by mouth every 6 hours as needed for dizziness     order for DME autoCPAP 7-13 cmH20     pantoprazole (PROTONIX) 40 MG EC tablet Take 1 tablet (40 mg) by mouth  "daily     potassium chloride ER (KLOR-CON) 20 MEQ CR tablet TAKE 1 TABLET EVERY DAY     rosuvastatin (CRESTOR) 5 MG tablet TAKE 1 TABLET EVERY DAY     verapamil ER (CALAN-SR) 180 MG CR tablet Take 1 tablet (180 mg) by mouth 2 times daily     Current Facility-Administered Medications   Medication     lidocaine (PF) (XYLOCAINE) 1 % injection 2 mL     lidocaine 1 % injection 1 mL     lidocaine 1 % injection 1 mL     lidocaine 1 % injection 3 mL     lidocaine 1 % injection 3 mL     methylPREDNISolone (DEPO-MEDROL) injection 80 mg     methylPREDNISolone (DEPO-MEDROL) injection 80 mg     methylPREDNISolone (DEPO-MEDROL) injection 80 mg     methylPREDNISolone (DEPO-MEDROL) injection 80 mg     methylPREDNISolone (DEPO-MEDROL) injection 80 mg     methylPREDNISolone (DEPO-MEDROL) injection 80 mg        Allergies:     Allergies   Allergen Reactions     Aleve [Naproxen]      Asa [Aspirin]      Codeine      Doxycycline      Elavil [Amitriptyline]      Etodolac      Gabapentin      Lyrica [Pregabalin]      [place] Other (See Comments)     Mushroom      Oxycodone      Oxycodone-Acetaminophen      Vicodin [Hydrocodone-Acetaminophen]         Review of Systems:   As noted above     Physical Exam:   Vitals: /72   Pulse 80   Ht 1.549 m (5' 1\")   Wt 69.4 kg (153 lb)   SpO2 95%   BMI 28.91 kg/m     CV: peripheral pulse appreciated  Lungs: breathing comfortably  Extremities: no edema    Neuro:   General Appearance: No apparent distress, well-nourished, well-groomed, pleasant     Mental Status: Alert and oriented to person, place, and time. Speech fluent and comprehension intact. No dysarthria. 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     Gait: normal casual gait, normal stride length         Data: Pertinent prior to visit   Imaging:  none    Laboratory:  Last Comprehensive Metabolic Panel:  Sodium   Date Value Ref Range Status   07/09/2021 138 133 - 144 mmol/L Final     Potassium   Date Value Ref Range Status   07/09/2021 3.9 3.4 - 5.3 mmol/L Final     Chloride   Date Value Ref Range Status   07/09/2021 102 94 - 109 mmol/L Final     Carbon Dioxide   Date Value Ref Range Status   07/09/2021 30 20 - 32 mmol/L Final     Anion Gap   Date Value Ref Range Status   07/09/2021 6 3 - 14 mmol/L Final     Glucose   Date Value Ref Range Status   07/09/2021 87 70 - 99 mg/dL Final     Comment:     Fasting specimen     Urea Nitrogen   Date Value Ref Range Status   07/09/2021 23 7 - 30 mg/dL Final     Creatinine   Date Value Ref Range Status   07/09/2021 1.16 (H) 0.52 - 1.04 mg/dL Final     GFR Estimate   Date Value Ref Range Status   07/09/2021 44 (L) >60 mL/min/[1.73_m2] Final     Comment:     Non  GFR Calc  Starting 12/18/2018, serum creatinine based estimated GFR (eGFR) will be   calculated using the Chronic Kidney Disease Epidemiology Collaboration   (CKD-EPI) equation.       Calcium   Date Value Ref Range Status   07/09/2021 9.5 8.5 - 10.1 mg/dL Final     Bilirubin Total   Date Value Ref Range Status   07/09/2021 0.7 0.2 - 1.3 mg/dL Final     Alkaline Phosphatase   Date Value Ref Range Status   07/09/2021 54 40 - 150 U/L Final     ALT   Date  Value Ref Range Status   07/09/2021 35 0 - 50 U/L Final     AST   Date Value Ref Range Status   07/09/2021 20 0 - 45 U/L Final             Lab Results   Component Value Date    WBC 7.1 07/09/2021     Lab Results   Component Value Date    RBC 4.75 07/09/2021     Lab Results   Component Value Date    HGB 14.1 07/09/2021     Lab Results   Component Value Date    HCT 43.0 07/09/2021     No components found for: MCT  Lab Results   Component Value Date    MCV 91 07/09/2021     Lab Results   Component Value Date    MCH 29.7 07/09/2021     Lab Results   Component Value Date    MCHC 32.8 07/09/2021     Lab Results   Component Value Date    RDW 13.4 07/09/2021     Lab Results   Component Value Date     07/09/2021              Assessment and Plan:   Assessment:  Swati Marinelli is a 81 year old female who presents today for evaluation of dizziness.  She has had this for a long time.  To history and exam it appears multifactorial.  She does have elements of a more peripheral vertigo in when she rolls over in bed it can be aggravated but also she does have elements of lightheadedness likely due to poor hydration and lack of salt intake as well, which we discussed.  Her long standing visual field issues are not clear to me regarding if they are related.  She was told she had a previous arterial issue related to this.  Neurological examination as above is non focal with exception of known visual issue.       Plan:  --Vestibular PT discussed, she would like to wait as she previously has tried this with limited success  --She will work on hydration, slightly more salt intake as well  --MRI, MRA head without contrast to further work up non specific dizziness, further understand longstanding visual issues will choose this over CTA given elevated creatinine previously  --Will call her with results, she can keep me posted in the mean time as well with her progress. Will formally follow up in 3-4 months.             Amish Rodriguez,  MD   of Neurology   [facility]      The total time of this encounter today amounted to 52 minutes. This time included time spent with the patient, prep work, ordering tests, and performing post visit documentation.        Again, thank you for allowing me to participate in the care of your patient.        Sincerely,        Giacomo Rodriguez MD

## 2022-02-16 NOTE — NURSING NOTE
"Swtai Marinelli's goals for this visit include: consult  She requests these members of her care team be copied on today's visit information:     PCP: Leeanne Cunha    Referring Provider:  Shabnam Blanton MD  [address] [facility]  [place] [address]    /72   Pulse 80   Ht 1.549 m (5' 1\")   Wt 69.4 kg (153 lb)   SpO2 95%   BMI 28.91 kg/m      Do you need any medication refills at today's visit? n  "

## 2022-03-03 ENCOUNTER — ANCILLARY PROCEDURE (OUTPATIENT)
Dept: MRI IMAGING | Facility: CLINIC | Age: 82
End: 2022-03-03
Attending: STUDENT IN AN ORGANIZED HEALTH CARE EDUCATION/TRAINING PROGRAM
Payer: COMMERCIAL

## 2022-03-03 ENCOUNTER — TELEPHONE (OUTPATIENT)
Dept: NEUROLOGY | Facility: CLINIC | Age: 82
End: 2022-03-03

## 2022-03-03 DIAGNOSIS — R42 DIZZINESS: ICD-10-CM

## 2022-03-03 PROCEDURE — 70544 MR ANGIOGRAPHY HEAD W/O DYE: CPT | Mod: 59 | Performed by: RADIOLOGY

## 2022-03-03 PROCEDURE — 70551 MRI BRAIN STEM W/O DYE: CPT | Mod: GC | Performed by: RADIOLOGY

## 2022-03-03 NOTE — TELEPHONE ENCOUNTER
Called to inform of essentially normal MRI results.  Her findings on the MRI are along the spectrum of age appropriate.  MRA not revealing for aneurysm or cause of dizziness/profound stenosis.    Meclizine is helping her PRN.    She will reach out with any questions before her follow up in June.     Carri Quiñones is a 53 year old female presenting to the walk-in clinic today for cough x 1 month, but over the last couple of days increased coughing and noted wheezing and some shortness of breath. Patient states just needs an antibiotic.   Denies known Latex allergy or symptoms of Latex sensitivity.  Medications reviewed and updated.  Patient would like communication of their results via:        Cell Phone:   Telephone Information:   Mobile [phone]     Okay to leave a message containing results? Yes    Patient did use Albuterol inhaler this morning. Patient also did not take blood pressure pills yet today.

## 2022-04-13 DIAGNOSIS — E78.2 MIXED HYPERLIPIDEMIA: Chronic | ICD-10-CM

## 2022-04-13 DIAGNOSIS — G45.9 TRANSIENT CEREBRAL ISCHEMIA, UNSPECIFIED TYPE: ICD-10-CM

## 2022-04-13 DIAGNOSIS — I10 HYPERTENSION GOAL BP (BLOOD PRESSURE) < 150/90: Chronic | ICD-10-CM

## 2022-04-13 DIAGNOSIS — K21.9 GASTROESOPHAGEAL REFLUX DISEASE WITHOUT ESOPHAGITIS: Chronic | ICD-10-CM

## 2022-04-13 RX ORDER — LISINOPRIL/HYDROCHLOROTHIAZIDE 10-12.5 MG
1 TABLET ORAL DAILY
Qty: 90 TABLET | Refills: 0 | Status: SHIPPED | OUTPATIENT
Start: 2022-04-13 | End: 2022-08-08

## 2022-04-13 RX ORDER — ROSUVASTATIN CALCIUM 5 MG/1
TABLET, COATED ORAL
Qty: 90 TABLET | Refills: 0 | Status: SHIPPED | OUTPATIENT
Start: 2022-04-13 | End: 2022-07-05

## 2022-04-13 RX ORDER — ROSUVASTATIN CALCIUM 5 MG/1
TABLET, COATED ORAL
Qty: 90 TABLET | Refills: 2 | Status: CANCELLED | OUTPATIENT
Start: 2022-04-13

## 2022-04-13 RX ORDER — POTASSIUM CHLORIDE 1500 MG/1
TABLET, EXTENDED RELEASE ORAL
Qty: 90 TABLET | Refills: 0 | Status: SHIPPED | OUTPATIENT
Start: 2022-04-13 | End: 2022-07-05

## 2022-04-13 RX ORDER — POTASSIUM CHLORIDE 1500 MG/1
TABLET, EXTENDED RELEASE ORAL
Qty: 90 TABLET | Refills: 1 | Status: CANCELLED | OUTPATIENT
Start: 2022-04-13

## 2022-04-13 RX ORDER — CLOPIDOGREL BISULFATE 75 MG/1
TABLET ORAL
Qty: 90 TABLET | Refills: 1 | Status: CANCELLED | OUTPATIENT
Start: 2022-04-13

## 2022-04-13 RX ORDER — VERAPAMIL HYDROCHLORIDE 180 MG/1
180 TABLET, EXTENDED RELEASE ORAL 2 TIMES DAILY
Qty: 180 TABLET | Refills: 1 | OUTPATIENT
Start: 2022-04-13

## 2022-04-13 RX ORDER — PANTOPRAZOLE SODIUM 40 MG/1
40 TABLET, DELAYED RELEASE ORAL DAILY
Qty: 90 TABLET | Refills: 1 | Status: CANCELLED | OUTPATIENT
Start: 2022-04-13

## 2022-04-18 ENCOUNTER — OFFICE VISIT (OUTPATIENT)
Dept: FAMILY MEDICINE | Facility: CLINIC | Age: 82
End: 2022-04-18
Payer: COMMERCIAL

## 2022-04-18 VITALS
RESPIRATION RATE: 16 BRPM | WEIGHT: 154 LBS | OXYGEN SATURATION: 96 % | HEART RATE: 89 BPM | TEMPERATURE: 98.1 F | DIASTOLIC BLOOD PRESSURE: 75 MMHG | BODY MASS INDEX: 29.1 KG/M2 | SYSTOLIC BLOOD PRESSURE: 154 MMHG

## 2022-04-18 DIAGNOSIS — N18.31 STAGE 3A CHRONIC KIDNEY DISEASE (H): ICD-10-CM

## 2022-04-18 DIAGNOSIS — Z00.00 ENCOUNTER FOR MEDICARE ANNUAL WELLNESS EXAM: Primary | ICD-10-CM

## 2022-04-18 DIAGNOSIS — Z12.31 ENCOUNTER FOR SCREENING MAMMOGRAM FOR BREAST CANCER: ICD-10-CM

## 2022-04-18 LAB
ANION GAP SERPL CALCULATED.3IONS-SCNC: 6 MMOL/L (ref 3–14)
BUN SERPL-MCNC: 18 MG/DL (ref 7–30)
CALCIUM SERPL-MCNC: 9.4 MG/DL (ref 8.5–10.1)
CHLORIDE BLD-SCNC: 104 MMOL/L (ref 94–109)
CHOLEST SERPL-MCNC: 173 MG/DL
CO2 SERPL-SCNC: 29 MMOL/L (ref 20–32)
CREAT SERPL-MCNC: 1.01 MG/DL (ref 0.52–1.04)
CREAT UR-MCNC: 157 MG/DL
FASTING STATUS PATIENT QL REPORTED: YES
GFR SERPL CREATININE-BSD FRML MDRD: 55 ML/MIN/1.73M2
GLUCOSE BLD-MCNC: 79 MG/DL (ref 70–99)
HDLC SERPL-MCNC: 33 MG/DL
HGB BLD-MCNC: 14.1 G/DL (ref 11.7–15.7)
LDLC SERPL CALC-MCNC: 106 MG/DL
MICROALBUMIN UR-MCNC: 63 MG/L
MICROALBUMIN/CREAT UR: 40.13 MG/G CR (ref 0–25)
NONHDLC SERPL-MCNC: 140 MG/DL
POTASSIUM BLD-SCNC: 4.3 MMOL/L (ref 3.4–5.3)
SODIUM SERPL-SCNC: 139 MMOL/L (ref 133–144)
TRIGL SERPL-MCNC: 169 MG/DL

## 2022-04-18 PROCEDURE — 80061 LIPID PANEL: CPT | Performed by: FAMILY MEDICINE

## 2022-04-18 PROCEDURE — 85018 HEMOGLOBIN: CPT | Performed by: FAMILY MEDICINE

## 2022-04-18 PROCEDURE — 36415 COLL VENOUS BLD VENIPUNCTURE: CPT | Performed by: FAMILY MEDICINE

## 2022-04-18 PROCEDURE — 99397 PER PM REEVAL EST PAT 65+ YR: CPT | Performed by: FAMILY MEDICINE

## 2022-04-18 PROCEDURE — 82043 UR ALBUMIN QUANTITATIVE: CPT | Performed by: FAMILY MEDICINE

## 2022-04-18 PROCEDURE — 80048 BASIC METABOLIC PNL TOTAL CA: CPT | Performed by: FAMILY MEDICINE

## 2022-04-18 ASSESSMENT — PAIN SCALES - GENERAL: PAINLEVEL: NO PAIN (0)

## 2022-04-18 NOTE — PATIENT INSTRUCTIONS
Gradually stop Celexa over 2 weeks   10 mg or half a pill for 1 week , then half a pill every other day for 1 week , then half a pill every 2 days for one week then stop         Patient Education   Personalized Prevention Plan  You are due for the preventive services outlined below.  Your care team is available to assist you in scheduling these services.  If you have already completed any of these items, please share that information with your care team to update in your medical record.  Health Maintenance Due   Topic Date Due    Kidney Microalbumin Urine Test  Never done    ANNUAL REVIEW OF HM ORDERS  Never done    Zoster (Shingles) Vaccine (3 of 3) 11/07/2019    FALL RISK ASSESSMENT  11/18/2021    Basic Metabolic Panel  01/09/2022    Hemoglobin  07/09/2022

## 2022-04-18 NOTE — NURSING NOTE
"Chief Complaint   Patient presents with     Establish Care       Initial BP (!) 154/75   Pulse 89   Temp 98.1  F (36.7  C) (Tympanic)   Resp 16   Wt 69.9 kg (154 lb)   SpO2 96%   BMI 29.10 kg/m   Estimated body mass index is 29.1 kg/m  as calculated from the following:    Height as of 2/16/22: 1.549 m (5' 1\").    Weight as of this encounter: 69.9 kg (154 lb).  Medication Reconciliation: complete  Briseyda Sheth, FOSTER  "

## 2022-04-18 NOTE — PROGRESS NOTES
{PROVIDER CHARTING PREFERENCE:396662}    Subjective   Swati is a 81 year old who presents for the following health issues {ACCOMPANIED BY STATEMENT (Optional):523285}    HPI     {SUPERLIST (Optional):527875}      Est Care    Review of Systems   {ROS COMP (Optional):988088}      Objective    There were no vitals taken for this visit.  There is no height or weight on file to calculate BMI.  Physical Exam   {Exam List (Optional):986666}    {Diagnostic Test Results (Optional):709133}    {AMBULATORY ATTESTATION (Optional):342523}

## 2022-04-18 NOTE — PROGRESS NOTES
"  SUBJECTIVE:   Swati Marinelli is a 81 year old female who presents for Preventive Visit.      Patient has been advised of split billing requirements and indicates understanding: Yes  Are you in the first 12 months of your Medicare Part B coverage?  No    Physical Health:    In general, how would you rate your overall physical health? good    Outside of work, how many days during the week do you exercise? 2-3 days/week    Outside of work, approximately how many minutes a day do you exercise?30-45 minutes    If you drink alcohol do you typically have >3 drinks per day or >7 drinks per week? No    Do you usually eat at least 4 servings of fruit and vegetables a day, include whole grains & fiber and avoid regularly eating high fat or \"junk\" foods? NO    Do you have any problems taking medications regularly?  No    Do you have any side effects from medications? none    Needs assistance for the following daily activities: no assistance needed    Which of the following safety concerns are present in your home?  none identified     Hearing impairment: Yes, hearing aid    In the past 6 months, have you been bothered by leaking of urine?     Preventive -     Immunization History   Administered Date(s) Administered     COVID-19,PF,Pfizer (12+ Yrs) 02/03/2021, 02/24/2021, 10/06/2021     COVID-19,PF,Pfizer 12+ Yrs (2022 and After) 04/08/2022     Influenza (High Dose) 3 valent vaccine 09/12/2019     Influenza Vaccine Im 4yrs+ 4 Valent CCIIV4 09/13/2018     Influenza, Quad, High Dose, Pf, 65yr+ (Fluzone HD) 09/03/2020, 10/06/2021     Pneumo Conj 13-V (2010&after) 09/25/2015     Pneumococcal 23 valent 09/10/2009     TDAP Vaccine (Adacel) 07/12/2018     Zoster vaccine recombinant adjuvanted (SHINGRIX) 09/12/2019     Zoster vaccine, live 09/15/2009       -Mammogram: ordered       - Colon CA screen: Colonoscopy, age 50-75 every 10 years or FIT every year or Cologuard every 3 years   Declined colon cancer screen       Mental " Health:    In general, how would you rate your overall mental or emotional health? good  PHQ-2 Score:      Do you feel safe in your environment? Yes    Have you ever done Advance Care Planning? (For example, a Health Directive, POLST, or a discussion with a medical provider or your loved ones about your wishes): Yes, patient states has an Advance Care Planning document and will bring a copy to the clinic.    Additional concerns to address?  No    Fall risk:  Fallen 2 or more times in the past year?: No  Any fall with injury in the past year?: No    Cognitive Screenin) Repeat 3 items (Leader, Season, Table)    2) Clock draw: NORMAL  3) 3 item recall: Recalls 3 objects  Results: 3 items recalled: COGNITIVE IMPAIRMENT LESS LIKELY    Mini-CogTM Copyright S Tarun. Licensed by the author for use in [facility]; reprinted with permission (saira@[facility]). All rights reserved.      Do you have sleep apnea, excessive snoring or daytime drowsiness?: use to CPAP was told to stop using it.            Reviewed and updated as needed this visit by clinical staff   Tobacco   Meds   Med Hx  Surg Hx  Fam Hx  Soc Hx          Reviewed and updated as needed this visit by Provider       Med Hx  Surg Hx  Fam Hx           Social History     Tobacco Use     Smoking status: Never Smoker     Smokeless tobacco: Never Used   Substance Use Topics     Alcohol use: No                           Current providers sharing in care for this patient include:   Patient Care Team:  Paris Martinez MD as PCP - General (Family Medicine)  Castillo Heller MD as Assigned Musculoskeletal Provider  Oralia Brewster PA-C as Assigned PCP  Candida Sutton OD as Assigned Surgical Provider  Giacomo Rodriguez MD as Assigned Neuroscience Provider    The following health maintenance items are reviewed in Epic and correct as of today:  Health Maintenance   Topic Date Due     MICROALBUMIN  Never done     ZOSTER IMMUNIZATION  (3 of 3) 11/07/2019     FALL RISK ASSESSMENT  11/18/2021     BMP  01/09/2022     LIPID  07/09/2022     MEDICARE ANNUAL WELLNESS VISIT  04/18/2023     ANNUAL REVIEW OF HM ORDERS  04/18/2023     HEMOGLOBIN  04/18/2023     ADVANCE CARE PLANNING  04/18/2027     DTAP/TDAP/TD IMMUNIZATION (2 - Td or Tdap) 07/12/2028     DEXA  11/21/2031     PHQ-2 (once per calendar year)  Completed     INFLUENZA VACCINE  Completed     Pneumococcal Vaccine: 65+ Years  Completed     URINALYSIS  Completed     COVID-19 Vaccine  Completed     IPV IMMUNIZATION  Aged Out     MENINGITIS IMMUNIZATION  Aged Out     HEPATITIS B IMMUNIZATION  Aged Out     Lab work is in process  BP Readings from Last 3 Encounters:   04/18/22 (!) 154/75   02/16/22 118/72   02/07/22 115/62    Wt Readings from Last 3 Encounters:   04/18/22 69.9 kg (154 lb)   02/16/22 69.4 kg (153 lb)   02/07/22 69.4 kg (153 lb)                  Patient Active Problem List   Diagnosis     Advanced directives, counseling/discussion     Hypertension goal BP (blood pressure) < 150/90     Neurocardiogenic syncope     Mixed hyperlipidemia     MANNY (obstructive sleep apnea)- 'moderate' (AHI 19)     Nonrheumatic aortic valve insufficiency     Anxiety     Gastroesophageal reflux disease without esophagitis     Sicca syndrome (H)     ANH positive     Primary osteoarthritis involving multiple joints     Malignant neoplasm of skin of face     CKD (chronic kidney disease) stage 3, GFR 30-59 ml/min (H)     Shoulder pain, bilateral     Past Surgical History:   Procedure Laterality Date     APPENDECTOMY OPEN CHILD  1951     CARPAL TUNNEL RELEASE RT/LT Right 1995 1990s     CATARACT IOL, RT/LT  2009    right 2/4/09, left 2/21/09     CHOLECYSTECTOMY  06/2012     COLONOSCOPY  2012    normal     MOHS MICROGRAPHIC PROCEDURE  2011    back - 2011, lip and back 5/2012     ROTATOR CUFF REPAIR RT/LT Right 2009     ROTATOR CUFF REPAIR RT/LT Left 04/20/2016     TONSILLECTOMY  1945     ZZC TOTAL KNEE ARTHROPLASTY  Left 2011     ZZC TOTAL KNEE ARTHROPLASTY Left 2013    revision       Social History     Tobacco Use     Smoking status: Never Smoker     Smokeless tobacco: Never Used   Substance Use Topics     Alcohol use: No     Family History   Problem Relation Age of Onset     Tuberculosis Mother      Myocardial Infarction Father      Coronary Artery Disease Father      Myocardial Infarction Brother      Coronary Artery Disease Brother      Heart Surgery Daughter      Diabetes No family hx of      Colon Cancer No family hx of      Breast Cancer No family hx of      Glaucoma No family hx of      Macular Degeneration No family hx of          Current Outpatient Medications   Medication Sig Dispense Refill     clopidogrel (PLAVIX) 75 MG tablet TAKE 1 TABLET (75 MG) BY MOUTH DAILY 90 tablet 1     lisinopril-hydrochlorothiazide (ZESTORETIC) 10-12.5 MG tablet Take 1 tablet by mouth daily 90 tablet 0     loratadine (CLARITIN) 10 MG tablet Take 1 tablet (10 mg) by mouth daily 90 tablet 1     meclizine (ANTIVERT) 25 MG tablet Take 1 tablet (25 mg) by mouth every 6 hours as needed for dizziness 90 tablet 3     order for DME autoCPAP 7-13 cmH20 1 Device 0     pantoprazole (PROTONIX) 40 MG EC tablet Take 1 tablet (40 mg) by mouth daily 90 tablet 1     potassium chloride ER (KLOR-CON) 20 MEQ CR tablet TAKE 1 TABLET EVERY DAY 90 tablet 0     rosuvastatin (CRESTOR) 5 MG tablet TAKE 1 TABLET EVERY DAY 90 tablet 0     verapamil ER (CALAN-SR) 180 MG CR tablet Take 1 tablet (180 mg) by mouth 2 times daily 180 tablet 1     Allergies   Allergen Reactions     Aleve [Naproxen]      Asa [Aspirin]      Codeine      Doxycycline      Elavil [Amitriptyline]      Etodolac      Gabapentin      Lyrica [Pregabalin]      Tomás Other (See Comments)     Mushroom      Oxycodone      Oxycodone-Acetaminophen      Vicodin [Hydrocodone-Acetaminophen]      Recent Labs   Lab Test 07/09/21  0911 10/01/20  0801 05/04/20  1809 10/30/19  0957 07/24/19  0901 01/14/19  1033  "07/11/18  0944 04/19/18  1105   * 98  --  102*  --  103*  --   --    HDL 32* 41*  --  42*  --  36*  --   --    TRIG 209* 134  --  110  --  187*  --   --    ALT 35 30 29 27  --   --   --  25   CR 1.16* 0.95 1.08* 0.89   < > 1.05*   < >  --    GFRESTIMATED 44* 56* 48* 61   < > 50*   < >  --    GFRESTBLACK 51* 65 56* 71   < > 58*   < >  --    POTASSIUM 3.9 3.8 3.8 3.8   < > 4.2   < >  --    TSH  --   --   --   --   --  3.59  --  2.68    < > = values in this interval not displayed.      Mammogram Screening: Mammogram Screening - Patient over age 75, has elected to continue with screening.    ROS:  Constitutional, HEENT, cardiovascular, pulmonary, gi and gu systems are negative, except as otherwise noted.    OBJECTIVE:   BP (!) 154/75   Pulse 89   Temp 98.1  F (36.7  C) (Tympanic)   Resp 16   Wt 69.9 kg (154 lb)   SpO2 96%   BMI 29.10 kg/m   Estimated body mass index is 29.1 kg/m  as calculated from the following:    Height as of 2/16/22: 1.549 m (5' 1\").    Weight as of this encounter: 69.9 kg (154 lb).  EXAM:   GENERAL: healthy, alert and no distress  NECK: no adenopathy, no asymmetry, masses, or scars and thyroid normal to palpation  RESP: lungs clear to auscultation - no rales, rhonchi or wheezes  CV: regular rate and rhythm, normal S1 S2, no S3 or S4, no murmur, click or rub, no peripheral edema and peripheral pulses strong  ABDOMEN: soft, nontender, no hepatosplenomegaly, no masses and bowel sounds normal  MS: no gross musculoskeletal defects noted, no edema        ASSESSMENT / PLAN:   (Z00.00) Encounter for Medicare annual wellness exam  (primary encounter diagnosis)  Comment: Preventive care and updated  Plan: Hemoglobin, Lipid panel reflex to direct LDL         Fasting            (N18.31) Stage 3a chronic kidney disease (H)  Comment: Stable.  Plan: Albumin Random Urine Quantitative with Creat         Ratio, BASIC METABOLIC PANEL        Repeat BMP today, avoid nephrotoxins  (Z12.31) Encounter for " "screening mammogram for breast cancer  Comment: Patient would like to do mammogram for breast cancer screening  Plan: *MA Screening Digital Bilateral             COUNSELING:  Reviewed preventive health counseling, as reflected in patient instructions       Regular exercise       Fall risk prevention    Estimated body mass index is 29.1 kg/m  as calculated from the following:    Height as of 2/16/22: 1.549 m (5' 1\").    Weight as of this encounter: 69.9 kg (154 lb).        She reports that she has never smoked. She has never used smokeless tobacco.    Appropriate preventive services were discussed with this patient, including applicable screening as appropriate for cardiovascular disease, diabetes, osteopenia/osteoporosis, and glaucoma.  As appropriate for age/gender, discussed screening for colorectal cancer, prostate cancer, breast cancer, and cervical cancer. Checklist reviewing preventive services available has been given to the patient.    Reviewed patients plan of care and provided an AVS. The Basic Care Plan (routine screening as documented in Health Maintenance) for June meets the Care Plan requirement. This Care Plan has been established and reviewed with the Patient.    Counseling Resources:  ATP IV Guidelines  Pooled Cohorts Equation Calculator  Breast Cancer Risk Calculator  BRCA-Related Cancer Risk Assessment: FHS-7 Tool  FRAX Risk Assessment  ICSI Preventive Guidelines  Dietary Guidelines for Americans, 2010  [employer] MyPlate  ASA Prophylaxis  Lung CA Screening    Keshawn Plascencia MD  [facility]  "

## 2022-04-26 DIAGNOSIS — G45.9 TRANSIENT CEREBRAL ISCHEMIA, UNSPECIFIED TYPE: ICD-10-CM

## 2022-04-26 DIAGNOSIS — I10 HYPERTENSION GOAL BP (BLOOD PRESSURE) < 150/90: Chronic | ICD-10-CM

## 2022-04-26 DIAGNOSIS — K21.9 GASTROESOPHAGEAL REFLUX DISEASE WITHOUT ESOPHAGITIS: Chronic | ICD-10-CM

## 2022-04-26 DIAGNOSIS — E78.2 MIXED HYPERLIPIDEMIA: Chronic | ICD-10-CM

## 2022-04-26 NOTE — TELEPHONE ENCOUNTER
Last appointment with Dr. Plascencia.  Can you take over prescription's?  Solange Aguiar BSN, RN

## 2022-04-27 RX ORDER — PANTOPRAZOLE SODIUM 40 MG/1
40 TABLET, DELAYED RELEASE ORAL DAILY
Qty: 90 TABLET | Refills: 1 | OUTPATIENT
Start: 2022-04-27

## 2022-04-27 RX ORDER — VERAPAMIL HYDROCHLORIDE 180 MG/1
180 TABLET, EXTENDED RELEASE ORAL 2 TIMES DAILY
Qty: 180 TABLET | Refills: 1 | OUTPATIENT
Start: 2022-04-27

## 2022-04-27 RX ORDER — CLOPIDOGREL BISULFATE 75 MG/1
TABLET ORAL
Qty: 90 TABLET | Refills: 1 | OUTPATIENT
Start: 2022-04-27

## 2022-06-09 ENCOUNTER — TELEPHONE (OUTPATIENT)
Dept: NEUROLOGY | Facility: CLINIC | Age: 82
End: 2022-06-09
Payer: COMMERCIAL

## 2022-06-09 NOTE — TELEPHONE ENCOUNTER
[facility] Call Center    Phone Message    May a detailed message be left on voicemail: yes     Reason for Call: patient is questioning if it is necessary for her to keep her appointment on  6/15/22. Writer offered to cancel appointment but patient declined, she would like to ask provider if he wants to see her that day. Please call pt back to advise      Action Taken: Message routed to:  Adult Clinics: Neurology p [phone]    Travel Screening: Not Applicable

## 2022-06-09 NOTE — TELEPHONE ENCOUNTER
If she is feeling better we don't have to keep it!  The imaging looked good.  Will depend if she wants to formally review the imaging with me or has more questions for me.  If she wants to have follow up be as needed/should she worsen further we could do that too.     Thanks,    -Amish

## 2022-06-10 NOTE — TELEPHONE ENCOUNTER
Called and spoke with patient. Patient decided she would like to discuss the imaging more in depth with Dr. Rodriguez. She is concerned about finding a ride, but she will call back on Monday to reschedule if she cannot.

## 2022-06-15 ENCOUNTER — OFFICE VISIT (OUTPATIENT)
Dept: NEUROLOGY | Facility: CLINIC | Age: 82
End: 2022-06-15
Payer: COMMERCIAL

## 2022-06-15 VITALS
HEART RATE: 81 BPM | SYSTOLIC BLOOD PRESSURE: 105 MMHG | BODY MASS INDEX: 29.07 KG/M2 | HEIGHT: 61 IN | DIASTOLIC BLOOD PRESSURE: 63 MMHG | WEIGHT: 154 LBS

## 2022-06-15 DIAGNOSIS — R42 DIZZINESS: Primary | ICD-10-CM

## 2022-06-15 DIAGNOSIS — R41.3 MEMORY CHANGE: ICD-10-CM

## 2022-06-15 PROCEDURE — 99214 OFFICE O/P EST MOD 30 MIN: CPT | Performed by: STUDENT IN AN ORGANIZED HEALTH CARE EDUCATION/TRAINING PROGRAM

## 2022-06-15 ASSESSMENT — PAIN SCALES - GENERAL: PAINLEVEL: NO PAIN (0)

## 2022-06-15 NOTE — PROGRESS NOTES
[facility]  Section of General Neurology  Return Patient Visit    Swati Marinelli MRN# [id number]   Age: 82 year old YOB: 1940     Brief history of symptoms: The patient was initially seen in neurologic consultation on 2/16/22 for evaluation of dizziness. Please see the comprehensive neurologic consultation note from that date in the Epic records for details.         Interval history:   Dizziness in general--similar to previously but not worse.  She has developed work arounds and feels she can live with it.   MRI/MRA reviewed with patient as below.  She is worried her memory could be slipping but feels it could just be old age.   Trouble concentrating has become the biggest aspect of this.  She does get enough sleep  She has no problems with mood.   She knows she needs to walk/exercise more.       Past Medical History:     Patient Active Problem List   Diagnosis     Advanced directives, counseling/discussion     Hypertension goal BP (blood pressure) < 150/90     Neurocardiogenic syncope     Mixed hyperlipidemia     MANNY (obstructive sleep apnea)- 'moderate' (AHI 19)     Nonrheumatic aortic valve insufficiency     Anxiety     Gastroesophageal reflux disease without esophagitis     Sicca syndrome (H)     ANH positive     Primary osteoarthritis involving multiple joints     Malignant neoplasm of skin of face     CKD (chronic kidney disease) stage 3, GFR 30-59 ml/min (H)     Shoulder pain, bilateral     Past Medical History:   Diagnosis Date     GERD (gastroesophageal reflux disease)      Hypertension goal BP (blood pressure) < 150/90 11/14/2017     Mixed hyperlipidemia 11/14/2017     MANNY (obstructive sleep apnea)- 'moderate' (AHI 19) 11/14/2017    Initial diagnosis ?[facility], [place].  Study Date: 8/17/2018- (154.0 lbs) It was frequently difficult to discern between RERAs and PLMs. The combined apnea/hypopnea index was 19.9 events per hour (central apnea/hypopnea index was 0.5 events per hour).  The REM AHI was 74.4 events per hour. The supine AHI was n/a.  RDI was 49.0 events per hour. Baseline oxygen saturation was 91.6%. Lowest oxygen saturatio     TIA (transient ischemic attack) 2010    Possible TIA approximately 2010. Occular symptoms in left eye, likely occipital         Past Surgical History:     Past Surgical History:   Procedure Laterality Date     APPENDECTOMY OPEN CHILD  1951     CARPAL TUNNEL RELEASE RT/LT Right 1995 1990s     CATARACT IOL, RT/LT  2009    right 2/4/09, left 2/21/09     CHOLECYSTECTOMY  06/2012     COLONOSCOPY  2012    normal     MOHS MICROGRAPHIC PROCEDURE  2011    back - 2011, lip and back 5/2012     ROTATOR CUFF REPAIR RT/LT Right 2009     ROTATOR CUFF REPAIR RT/LT Left 04/20/2016     TONSILLECTOMY  1945     ZZC TOTAL KNEE ARTHROPLASTY Left 2011     ZZC TOTAL KNEE ARTHROPLASTY Left 2013    revision        Social History:     Social History     Tobacco Use     Smoking status: Never Smoker     Smokeless tobacco: Never Used   Substance Use Topics     Alcohol use: No     Drug use: No        Family History:     Family History   Problem Relation Age of Onset     Tuberculosis Mother      Myocardial Infarction Father      Coronary Artery Disease Father      Myocardial Infarction Brother      Coronary Artery Disease Brother      Heart Surgery Daughter      Diabetes No family hx of      Colon Cancer No family hx of      Breast Cancer No family hx of      Glaucoma No family hx of      Macular Degeneration No family hx of         Medications:     Current Outpatient Medications   Medication Sig     clopidogrel (PLAVIX) 75 MG tablet TAKE 1 TABLET (75 MG) BY MOUTH DAILY     lisinopril-hydrochlorothiazide (ZESTORETIC) 10-12.5 MG tablet Take 1 tablet by mouth daily     loratadine (CLARITIN) 10 MG tablet Take 1 tablet (10 mg) by mouth daily     meclizine (ANTIVERT) 25 MG tablet Take 1 tablet (25 mg) by mouth every 6 hours as needed for dizziness     order for DME autoCPAP 7-13 cmH20      "pantoprazole (PROTONIX) 40 MG EC tablet Take 1 tablet (40 mg) by mouth daily     potassium chloride ER (KLOR-CON) 20 MEQ CR tablet TAKE 1 TABLET EVERY DAY     rosuvastatin (CRESTOR) 5 MG tablet TAKE 1 TABLET EVERY DAY     verapamil ER (CALAN-SR) 180 MG CR tablet Take 1 tablet (180 mg) by mouth 2 times daily     Current Facility-Administered Medications   Medication     lidocaine (PF) (XYLOCAINE) 1 % injection 2 mL     lidocaine 1 % injection 1 mL     lidocaine 1 % injection 1 mL     lidocaine 1 % injection 3 mL     lidocaine 1 % injection 3 mL     methylPREDNISolone (DEPO-MEDROL) injection 80 mg     methylPREDNISolone (DEPO-MEDROL) injection 80 mg     methylPREDNISolone (DEPO-MEDROL) injection 80 mg     methylPREDNISolone (DEPO-MEDROL) injection 80 mg     methylPREDNISolone (DEPO-MEDROL) injection 80 mg     methylPREDNISolone (DEPO-MEDROL) injection 80 mg        Allergies:     Allergies   Allergen Reactions     Aleve [Naproxen]      Asa [Aspirin]      Codeine      Doxycycline      Elavil [Amitriptyline]      Etodolac      Gabapentin      Lyrica [Pregabalin]      [place] Other (See Comments)     Mushroom      Oxycodone      Oxycodone-Acetaminophen      Vicodin [Hydrocodone-Acetaminophen]           Physical Exam:   Vitals: /63 (BP Location: Left arm, Patient Position: Sitting, Cuff Size: Adult Regular)   Pulse 81   Ht 1.549 m (5' 1\")   Wt 69.9 kg (154 lb)   BMI 29.10 kg/m     CV: peripheral pulse appreciated  Lungs: breathing comfortably  Extremities: no edema    Neuro:   General Appearance: No apparent distress, well-nourished, well-groomed, pleasant     Mental Status: Alert and oriented to person, place, and time. Speech fluent and comprehension intact. No dysarthria. To testing 21/30 0/5 delayed recall. She notes trouble focusing.  Conversationally quite appropriate.     Cranial Nerves:   II: Visual fields: normal  III: Pupils: 3 mm, equal, round, reactive to light   III,IV,VI: Extraocular Movements: " intact   V: Facial sensation: intact to light touch  VII: Facial strength: intact without asymmetry    Motor Exam:   5/5 diffusely    No drift is present. No abnormal movements. Tone is normal throughout.    Sensory: intact to light touch    Coordination: no dysmetria with finger-to-nose bilaterally    Reflexes: biceps, triceps, brachioradialis, patellar, and ankle jerks 1+ and symmetric.           Data: Pertinent prior to visit   Imaging:  Brain MRI without intravenous contrast  Head MRA without intravenous contrast     History:  Dizziness, persistent/recurrent, cardiac or vascular cause  suspected; Dizziness.  ICD-10: Dizziness     Comparison:  2/28/2020     Technique:  BRAIN MRI: sagittal T1-weighted and axial diffusion, FLAIR,  T2-weighted, and susceptibility images of the whole brain without  intravenous contrast.   HEAD MR Angiogram: 3D time-of-flight MRA of the head was also obtained  without intravenous contrast.     Findings:   There is no mass effect or midline shift or intracranial hemorrhage.  Axial diffusion weighted images demonstrate no definite acute  infarction. Moderate generalized cerebral parenchymal atrophy  including the hippocampi. Moderate vermis predominately cerebellar  atrophy. The ventricles do not appear enlarged out of proportion to  the cerebral sulci. The major intracranial vascular flow-voids do  appear patent. Nonspecific T2 hyperintense foci throughout the  periventricular and subcortical white matter.     Head MRA demonstrates patent major intracranial arteries. The anterior  communicating artery appears patent. Regarding the posterior  communicating arteries, they appear patent bilaterally. Hypoplastic V4  segment of the right vertebral artery. Symmetric diminutive appearance  of M2 branch vessels is probably artifactual.                                                                      Impression:  1. No evidence of intracranial aneurysm. 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  2. No acute intracranial pathology. Moderate cerebral and cerebellar  volume loss with mild to moderate chronic small vessel ischemic  Disease..    I personally reviewed the above imaging and agree with the findings in the report.        Procedures:      Laboratory:  Last b12 754   TSH   Date Value Ref Range Status   01/14/2019 3.59 0.40 - 4.00 mU/L Final              Assessment and Plan:     Swati Marinelli is a pleasant 82 year old female seen in follow up for dizziness. MRI/MRA reviewed with her and above and unrevealing, reassuring.  Dizziness is similar to previously despite conservative interventions but she has learned to live with it and it does not bother her a great deal.  She is concerned about her memory, specifically focus/concentration.   To testing her biggest deficit was short term memory but her thought process is quite clear and she is a quite coherent overall/able to do what she needs to do at home.  It is not clear to me if she would meet criteria for amnestic MCI but potentially she could in the future.  She sleeps well and has a good mood, previous blood work appropriate in this regard too.  She will try to modify and add more physical activity, she stays pretty mentally active.  We discussed the status of memory medications in 2022 and she could consider trialing one if she wanted.  She would rather watch and wait which is reasonable.  I recommended against prevagen in this regard.  All questions answered.  She will reach out and follow up on an as needed basis should  Memory worsen further or with further questions.                   Amish Rodrgiuez MD   of Neurology   [facility]      The total time of this encounter today amounted to 32 minutes. This time included time spent with the patient, prep work, ordering tests, and performing post visit documentation.

## 2022-06-15 NOTE — PROGRESS NOTES
"Swati Marinelli's goals for this visit include:   Chief Complaint   Patient presents with     RECHECK     Vertigo,Loss of peripheral visual field//4 month follow up       She requests these members of her care team be copied on today's visit information: yes    PCP: Paris Martinez    Referring Provider:  No referring provider defined for this encounter.    /63 (BP Location: Left arm, Patient Position: Sitting, Cuff Size: Adult Regular)   Pulse 81   Ht 1.549 m (5' 1\")   Wt 69.9 kg (154 lb)   BMI 29.10 kg/m      Do you need any medication refills at today's visit? No  GILDA Bonilla, FOSTER ([facility])    "

## 2022-06-15 NOTE — LETTER
6/15/2022         RE: Swati Marinelli  [address]  [place] [address]        Dear Colleague,    Thank you for referring your patient, Swati Marinelli, to the [facility] [place]. Please see a copy of my visit note below.    [facility]  Section of General Neurology  Return Patient Visit    Swati Marinelli MRN# [id number]   Age: 82 year old YOB: 1940     Brief history of symptoms: The patient was initially seen in neurologic consultation on 2/16/22 for evaluation of dizziness. Please see the comprehensive neurologic consultation note from that date in the Epic records for details.         Interval history:   Dizziness in general--similar to previously but not worse.  She has developed work arounds and feels she can live with it.   MRI/MRA reviewed with patient as below.  She is worried her memory could be slipping but feels it could just be old age.   Trouble concentrating has become the biggest aspect of this.  She does get enough sleep  She has no problems with mood.   She knows she needs to walk/exercise more.       Past Medical History:     Patient Active Problem List   Diagnosis     Advanced directives, counseling/discussion     Hypertension goal BP (blood pressure) < 150/90     Neurocardiogenic syncope     Mixed hyperlipidemia     MANNY (obstructive sleep apnea)- 'moderate' (AHI 19)     Nonrheumatic aortic valve insufficiency     Anxiety     Gastroesophageal reflux disease without esophagitis     Sicca syndrome (H)     ANH positive     Primary osteoarthritis involving multiple joints     Malignant neoplasm of skin of face     CKD (chronic kidney disease) stage 3, GFR 30-59 ml/min (H)     Shoulder pain, bilateral     Past Medical History:   Diagnosis Date     GERD (gastroesophageal reflux disease)      Hypertension goal BP (blood pressure) < 150/90 11/14/2017     Mixed hyperlipidemia 11/14/2017     MANNY (obstructive sleep apnea)- 'moderate'  (AHI 19) 11/14/2017    Initial diagnosis ?[facility], [place].  Study Date: 8/17/2018- (154.0 lbs) It was frequently difficult to discern between RERAs and PLMs. The combined apnea/hypopnea index was 19.9 events per hour (central apnea/hypopnea index was 0.5 events per hour). The REM AHI was 74.4 events per hour. The supine AHI was n/a.  RDI was 49.0 events per hour. Baseline oxygen saturation was 91.6%. Lowest oxygen saturatio     TIA (transient ischemic attack) 2010    Possible TIA approximately 2010. Occular symptoms in left eye, likely occipital         Past Surgical History:     Past Surgical History:   Procedure Laterality Date     APPENDECTOMY OPEN CHILD  1951     CARPAL TUNNEL RELEASE RT/LT Right 1995 1990s     CATARACT IOL, RT/LT  2009    right 2/4/09, left 2/21/09     CHOLECYSTECTOMY  06/2012     COLONOSCOPY  2012    normal     MOHS MICROGRAPHIC PROCEDURE  2011    back - 2011, lip and back 5/2012     ROTATOR CUFF REPAIR RT/LT Right 2009     ROTATOR CUFF REPAIR RT/LT Left 04/20/2016     TONSILLECTOMY  1945     ZZC TOTAL KNEE ARTHROPLASTY Left 2011     ZZC TOTAL KNEE ARTHROPLASTY Left 2013    revision        Social History:     Social History     Tobacco Use     Smoking status: Never Smoker     Smokeless tobacco: Never Used   Substance Use Topics     Alcohol use: No     Drug use: No        Family History:     Family History   Problem Relation Age of Onset     Tuberculosis Mother      Myocardial Infarction Father      Coronary Artery Disease Father      Myocardial Infarction Brother      Coronary Artery Disease Brother      Heart Surgery Daughter      Diabetes No family hx of      Colon Cancer No family hx of      Breast Cancer No family hx of      Glaucoma No family hx of      Macular Degeneration No family hx of         Medications:     Current Outpatient Medications   Medication Sig     clopidogrel (PLAVIX) 75 MG tablet TAKE 1 TABLET (75 MG) BY MOUTH DAILY     lisinopril-hydrochlorothiazide (ZESTORETIC) 10-12.5  "MG tablet Take 1 tablet by mouth daily     loratadine (CLARITIN) 10 MG tablet Take 1 tablet (10 mg) by mouth daily     meclizine (ANTIVERT) 25 MG tablet Take 1 tablet (25 mg) by mouth every 6 hours as needed for dizziness     order for DME autoCPAP 7-13 cmH20     pantoprazole (PROTONIX) 40 MG EC tablet Take 1 tablet (40 mg) by mouth daily     potassium chloride ER (KLOR-CON) 20 MEQ CR tablet TAKE 1 TABLET EVERY DAY     rosuvastatin (CRESTOR) 5 MG tablet TAKE 1 TABLET EVERY DAY     verapamil ER (CALAN-SR) 180 MG CR tablet Take 1 tablet (180 mg) by mouth 2 times daily     Current Facility-Administered Medications   Medication     lidocaine (PF) (XYLOCAINE) 1 % injection 2 mL     lidocaine 1 % injection 1 mL     lidocaine 1 % injection 1 mL     lidocaine 1 % injection 3 mL     lidocaine 1 % injection 3 mL     methylPREDNISolone (DEPO-MEDROL) injection 80 mg     methylPREDNISolone (DEPO-MEDROL) injection 80 mg     methylPREDNISolone (DEPO-MEDROL) injection 80 mg     methylPREDNISolone (DEPO-MEDROL) injection 80 mg     methylPREDNISolone (DEPO-MEDROL) injection 80 mg     methylPREDNISolone (DEPO-MEDROL) injection 80 mg        Allergies:     Allergies   Allergen Reactions     Aleve [Naproxen]      Asa [Aspirin]      Codeine      Doxycycline      Elavil [Amitriptyline]      Etodolac      Gabapentin      Lyrica [Pregabalin]      [place] Other (See Comments)     Mushroom      Oxycodone      Oxycodone-Acetaminophen      Vicodin [Hydrocodone-Acetaminophen]           Physical Exam:   Vitals: /63 (BP Location: Left arm, Patient Position: Sitting, Cuff Size: Adult Regular)   Pulse 81   Ht 1.549 m (5' 1\")   Wt 69.9 kg (154 lb)   BMI 29.10 kg/m     CV: peripheral pulse appreciated  Lungs: breathing comfortably  Extremities: no edema    Neuro:   General Appearance: No apparent distress, well-nourished, well-groomed, pleasant     Mental Status: Alert and oriented to person, place, and time. Speech fluent and comprehension " intact. No dysarthria. To testing 21/30 0/5 delayed recall. She notes trouble focusing.  Conversationally quite appropriate.     Cranial Nerves:   II: Visual fields: normal  III: Pupils: 3 mm, equal, round, reactive to light   III,IV,VI: Extraocular Movements: intact   V: Facial sensation: intact to light touch  VII: Facial strength: intact without asymmetry    Motor Exam:   5/5 diffusely    No drift is present. No abnormal movements. Tone is normal throughout.    Sensory: intact to light touch    Coordination: no dysmetria with finger-to-nose bilaterally    Reflexes: biceps, triceps, brachioradialis, patellar, and ankle jerks 1+ and symmetric.           Data: Pertinent prior to visit   Imaging:  Brain MRI without intravenous contrast  Head MRA without intravenous contrast     History:  Dizziness, persistent/recurrent, cardiac or vascular cause  suspected; Dizziness.  ICD-10: Dizziness     Comparison:  2/28/2020     Technique:  BRAIN MRI: sagittal T1-weighted and axial diffusion, FLAIR,  T2-weighted, and susceptibility images of the whole brain without  intravenous contrast.   HEAD MR Angiogram: 3D time-of-flight MRA of the head was also obtained  without intravenous contrast.     Findings:   There is no mass effect or midline shift or intracranial hemorrhage.  Axial diffusion weighted images demonstrate no definite acute  infarction. Moderate generalized cerebral parenchymal atrophy  including the hippocampi. Moderate vermis predominately cerebellar  atrophy. The ventricles do not appear enlarged out of proportion to  the cerebral sulci. The major intracranial vascular flow-voids do  appear patent. Nonspecific T2 hyperintense foci throughout the  periventricular and subcortical white matter.     Head MRA demonstrates patent major intracranial arteries. The anterior  communicating artery appears patent. Regarding the posterior  communicating arteries, they appear patent bilaterally. Hypoplastic V4  segment of the  right vertebral artery. Symmetric diminutive appearance  of M2 branch vessels is probably artifactual.                                                                      Impression:  1. No evidence of intracranial aneurysm. Hypoplastic V4 segment of the  right vertebral artery which is of uncertain clinical significance.  2. No acute intracranial pathology. Moderate cerebral and cerebellar  volume loss with mild to moderate chronic small vessel ischemic  Disease..    I personally reviewed the above imaging and agree with the findings in the report.        Procedures:      Laboratory:  Last b12 754   TSH   Date Value Ref Range Status   01/14/2019 3.59 0.40 - 4.00 mU/L Final              Assessment and Plan:     Swati Marinelli is a pleasant 82 year old female seen in follow up for dizziness. MRI/MRA reviewed with her and above and unrevealing, reassuring.  Dizziness is similar to previously despite conservative interventions but she has learned to live with it and it does not bother her a great deal.  She is concerned about her memory, specifically focus/concentration.   To testing her biggest deficit was short term memory but her thought process is quite clear and she is a quite coherent overall/able to do what she needs to do at home.  It is not clear to me if she would meet criteria for amnestic MCI but potentially she could in the future.  She sleeps well and has a good mood, previous blood work appropriate in this regard too.  She will try to modify and add more physical activity, she stays pretty mentally active.  We discussed the status of memory medications in 2022 and she could consider trialing one if she wanted.  She would rather watch and wait which is reasonable.  I recommended against prevagen in this regard.  All questions answered.  She will reach out and follow up on an as needed basis should  Memory worsen further or with further questions.                   Amish Rodriguez MD   of  "Neurology   [facility]      The total time of this encounter today amounted to 32 minutes. This time included time spent with the patient, prep work, ordering tests, and performing post visit documentation.      Swati Marinelli's goals for this visit include:   Chief Complaint   Patient presents with     RECHECK     Vertigo,Loss of peripheral visual field//4 month follow up       She requests these members of her care team be copied on today's visit information: yes    PCP: Paris Martinez    Referring Provider:  No referring provider defined for this encounter.    /63 (BP Location: Left arm, Patient Position: Sitting, Cuff Size: Adult Regular)   Pulse 81   Ht 1.549 m (5' 1\")   Wt 69.9 kg (154 lb)   BMI 29.10 kg/m      Do you need any medication refills at today's visit? No  GILDA Bonilla, FOSTER ([facility])        Again, thank you for allowing me to participate in the care of your patient.        Sincerely,        Giacomo Rodriguez MD    "

## 2022-06-15 NOTE — PATIENT INSTRUCTIONS
Do not take prevagen in my opinion  Work to stay physically and mentally active  Prescription memory pills discussed  Reach out to me if memory worsens I would be happy to see you back.

## 2022-06-16 ENCOUNTER — ANCILLARY PROCEDURE (OUTPATIENT)
Dept: MAMMOGRAPHY | Facility: CLINIC | Age: 82
End: 2022-06-16
Attending: FAMILY MEDICINE
Payer: COMMERCIAL

## 2022-06-16 DIAGNOSIS — Z12.31 ENCOUNTER FOR SCREENING MAMMOGRAM FOR BREAST CANCER: ICD-10-CM

## 2022-06-16 PROCEDURE — 77067 SCR MAMMO BI INCL CAD: CPT | Mod: TC | Performed by: RADIOLOGY

## 2022-06-16 PROCEDURE — 77063 BREAST TOMOSYNTHESIS BI: CPT | Mod: TC | Performed by: RADIOLOGY

## 2022-07-03 DIAGNOSIS — I10 HYPERTENSION GOAL BP (BLOOD PRESSURE) < 150/90: Chronic | ICD-10-CM

## 2022-07-03 DIAGNOSIS — E78.2 MIXED HYPERLIPIDEMIA: Chronic | ICD-10-CM

## 2022-07-05 RX ORDER — ROSUVASTATIN CALCIUM 5 MG/1
TABLET, COATED ORAL
Qty: 90 TABLET | Refills: 1 | Status: SHIPPED | OUTPATIENT
Start: 2022-07-05 | End: 2022-12-22

## 2022-07-05 RX ORDER — POTASSIUM CHLORIDE 1500 MG/1
TABLET, EXTENDED RELEASE ORAL
Qty: 90 TABLET | Refills: 1 | Status: SHIPPED | OUTPATIENT
Start: 2022-07-05 | End: 2022-12-22

## 2022-07-14 ENCOUNTER — OFFICE VISIT (OUTPATIENT)
Dept: FAMILY MEDICINE | Facility: CLINIC | Age: 82
End: 2022-07-14
Payer: COMMERCIAL

## 2022-07-14 VITALS
OXYGEN SATURATION: 95 % | RESPIRATION RATE: 16 BRPM | HEART RATE: 52 BPM | BODY MASS INDEX: 29.45 KG/M2 | DIASTOLIC BLOOD PRESSURE: 74 MMHG | SYSTOLIC BLOOD PRESSURE: 128 MMHG | WEIGHT: 156 LBS | HEIGHT: 61 IN | TEMPERATURE: 98.7 F

## 2022-07-14 DIAGNOSIS — R19.7 DIARRHEA, UNSPECIFIED TYPE: Primary | ICD-10-CM

## 2022-07-14 LAB
ALBUMIN SERPL-MCNC: 3.7 G/DL (ref 3.4–5)
ALP SERPL-CCNC: 68 U/L (ref 40–150)
ALT SERPL W P-5'-P-CCNC: 18 U/L (ref 0–50)
ANION GAP SERPL CALCULATED.3IONS-SCNC: 4 MMOL/L (ref 3–14)
AST SERPL W P-5'-P-CCNC: 13 U/L (ref 0–45)
BASOPHILS # BLD AUTO: 0 10E3/UL (ref 0–0.2)
BASOPHILS NFR BLD AUTO: 0 %
BILIRUB SERPL-MCNC: 0.4 MG/DL (ref 0.2–1.3)
BUN SERPL-MCNC: 23 MG/DL (ref 7–30)
C COLI+JEJUNI+LARI FUSA STL QL NAA+PROBE: NOT DETECTED
C DIFF TOX B STL QL: NEGATIVE
CALCIUM SERPL-MCNC: 9.3 MG/DL (ref 8.5–10.1)
CHLORIDE BLD-SCNC: 109 MMOL/L (ref 94–109)
CO2 SERPL-SCNC: 29 MMOL/L (ref 20–32)
CREAT SERPL-MCNC: 1.05 MG/DL (ref 0.52–1.04)
CRP SERPL-MCNC: 7.5 MG/L (ref 0–8)
EC STX1 GENE STL QL NAA+PROBE: NOT DETECTED
EC STX2 GENE STL QL NAA+PROBE: NOT DETECTED
EOSINOPHIL # BLD AUTO: 0.3 10E3/UL (ref 0–0.7)
EOSINOPHIL NFR BLD AUTO: 4 %
ERYTHROCYTE [DISTWIDTH] IN BLOOD BY AUTOMATED COUNT: 12.9 % (ref 10–15)
ERYTHROCYTE [SEDIMENTATION RATE] IN BLOOD BY WESTERGREN METHOD: 12 MM/HR (ref 0–30)
GFR SERPL CREATININE-BSD FRML MDRD: 52 ML/MIN/1.73M2
GLUCOSE BLD-MCNC: 93 MG/DL (ref 70–99)
HCT VFR BLD AUTO: 42 % (ref 35–47)
HGB BLD-MCNC: 14.2 G/DL (ref 11.7–15.7)
LYMPHOCYTES # BLD AUTO: 1.7 10E3/UL (ref 0.8–5.3)
LYMPHOCYTES NFR BLD AUTO: 26 %
MCH RBC QN AUTO: 30.2 PG (ref 26.5–33)
MCHC RBC AUTO-ENTMCNC: 33.8 G/DL (ref 31.5–36.5)
MCV RBC AUTO: 89 FL (ref 78–100)
MONOCYTES # BLD AUTO: 0.6 10E3/UL (ref 0–1.3)
MONOCYTES NFR BLD AUTO: 10 %
NEUTROPHILS # BLD AUTO: 3.8 10E3/UL (ref 1.6–8.3)
NEUTROPHILS NFR BLD AUTO: 59 %
NOROV GI+II ORF1-ORF2 JNC STL QL NAA+PR: NOT DETECTED
PLATELET # BLD AUTO: 284 10E3/UL (ref 150–450)
POTASSIUM BLD-SCNC: 4 MMOL/L (ref 3.4–5.3)
PROT SERPL-MCNC: 7.4 G/DL (ref 6.8–8.8)
RBC # BLD AUTO: 4.7 10E6/UL (ref 3.8–5.2)
RVA NSP5 STL QL NAA+PROBE: NOT DETECTED
SALMONELLA SP RPOD STL QL NAA+PROBE: NOT DETECTED
SHIGELLA SP+EIEC IPAH STL QL NAA+PROBE: NOT DETECTED
SODIUM SERPL-SCNC: 142 MMOL/L (ref 133–144)
V CHOL+PARA RFBL+TRKH+TNAA STL QL NAA+PR: NOT DETECTED
WBC # BLD AUTO: 6.4 10E3/UL (ref 4–11)
Y ENTERO RECN STL QL NAA+PROBE: NOT DETECTED

## 2022-07-14 PROCEDURE — 86140 C-REACTIVE PROTEIN: CPT | Performed by: FAMILY MEDICINE

## 2022-07-14 PROCEDURE — 36415 COLL VENOUS BLD VENIPUNCTURE: CPT | Performed by: FAMILY MEDICINE

## 2022-07-14 PROCEDURE — 80053 COMPREHEN METABOLIC PANEL: CPT | Performed by: FAMILY MEDICINE

## 2022-07-14 PROCEDURE — 87506 IADNA-DNA/RNA PROBE TQ 6-11: CPT | Performed by: FAMILY MEDICINE

## 2022-07-14 PROCEDURE — 87329 GIARDIA AG IA: CPT | Mod: 59 | Performed by: FAMILY MEDICINE

## 2022-07-14 PROCEDURE — 85652 RBC SED RATE AUTOMATED: CPT | Performed by: FAMILY MEDICINE

## 2022-07-14 PROCEDURE — 87493 C DIFF AMPLIFIED PROBE: CPT | Mod: 59 | Performed by: FAMILY MEDICINE

## 2022-07-14 PROCEDURE — 85025 COMPLETE CBC W/AUTO DIFF WBC: CPT | Performed by: FAMILY MEDICINE

## 2022-07-14 PROCEDURE — 99213 OFFICE O/P EST LOW 20 MIN: CPT | Performed by: FAMILY MEDICINE

## 2022-07-14 PROCEDURE — 87328 CRYPTOSPORIDIUM AG IA: CPT | Performed by: FAMILY MEDICINE

## 2022-07-14 ASSESSMENT — PAIN SCALES - GENERAL: PAINLEVEL: NO PAIN (0)

## 2022-07-14 ASSESSMENT — ENCOUNTER SYMPTOMS: DIARRHEA: 1

## 2022-07-14 NOTE — PROGRESS NOTES
Assessment & Plan     Diarrhea, unspecified type  Await labs and stool samples. If all negative, follow-up with GI  - **CBC with platelets differential FUTURE 2mo; Future  - **Comprehensive metabolic panel FUTURE 2mo; Future  - Enteric Bacteria and Virus Panel by GLADIS Stool; Future  - Clostridium difficile Toxin B PCR; Future  - Cryptosporidium/Giardia Immunoassay; Future  - ESR: Erythrocyte sedimentation rate; Future  - CRP, inflammation; Future  - CRP, inflammation  - ESR: Erythrocyte sedimentation rate  - Cryptosporidium/Giardia Immunoassay  - Clostridium difficile Toxin B PCR  - Enteric Bacteria and Virus Panel by GLADIS Stool  - **Comprehensive metabolic panel FUTURE 2mo  - **CBC with platelets differential FUTURE 2mo                 No follow-ups on file.    Paris Martinez MD  [facility]    Subjective June is a 82 year old, presenting for the following health issues:  Diarrhea (Loose stools)      Diarrhea    History of Present Illness       Reason for visit:  Stomach problems  Symptoms include:  Loose bms  Symptom intensity:  Moderate  Symptom progression:  Staying the same  Had these symptoms before:  No  What makes it worse:  None  What makes it better:  No    She eats 2-3 servings of fruits and vegetables daily.She consumes 2 sweetened beverage(s) daily.She exercises with enough effort to increase her heart rate 20 to 29 minutes per day.  She exercises with enough effort to increase her heart rate 5 days per week. She is missing 1 dose(s) of medications per week.  She is not taking prescribed medications regularly due to remembering to take.     Pt with diarrhea for 3-4 weeks  Stools are grainy and watery  stooling about 2 stools per day  Not black or bloody  Nobody else with this  No travel or new foods  No fever/weight loss/  Gets crampy and then followed by diarrhea. She is fine in between        Review of Systems   Gastrointestinal: Positive for diarrhea.     "  Constitutional, HEENT, cardiovascular, pulmonary, gi and gu systems are negative, except as otherwise noted.      Objective    /74   Pulse 52   Temp 98.7  F (37.1  C) (Oral)   Resp 16   Ht 1.549 m (5' 1\")   Wt 70.8 kg (156 lb)   SpO2 95%   BMI 29.48 kg/m    Body mass index is 29.48 kg/m .  Physical Exam   GENERAL: healthy, alert and no distress  RESP: lungs clear to auscultation - no rales, rhonchi or wheezes  CV: regular rate and rhythm, normal S1 S2, no S3 or S4, no murmur, click or rub, no peripheral edema and peripheral pulses strong  ABDOMEN: soft, nontender, no hepatosplenomegaly, no masses and bowel sounds normal    Results for orders placed or performed in visit on 07/14/22 (from the past 24 hour(s))   ESR: Erythrocyte sedimentation rate   Result Value Ref Range    Erythrocyte Sedimentation Rate 12 0 - 30 mm/hr   **CBC with platelets differential FUTURE 2mo    Narrative    The following orders were created for panel order **CBC with platelets differential FUTURE 2mo.  Procedure                               Abnormality         Status                     ---------                               -----------         ------                     CBC with platelets and d...[[id number]]                      Final result                 Please view results for these tests on the individual orders.   CBC with platelets and differential   Result Value Ref Range    WBC Count 6.4 4.0 - 11.0 10e3/uL    RBC Count 4.70 3.80 - 5.20 10e6/uL    Hemoglobin 14.2 11.7 - 15.7 g/dL    Hematocrit 42.0 35.0 - 47.0 %    MCV 89 78 - 100 fL    MCH 30.2 26.5 - 33.0 pg    MCHC 33.8 31.5 - 36.5 g/dL    RDW 12.9 10.0 - 15.0 %    Platelet Count 284 150 - 450 10e3/uL    % Neutrophils 59 %    % Lymphocytes 26 %    % Monocytes 10 %    % Eosinophils 4 %    % Basophils 0 %    Absolute Neutrophils 3.8 1.6 - 8.3 10e3/uL    Absolute Lymphocytes 1.7 0.8 - 5.3 10e3/uL    Absolute Monocytes 0.6 0.0 - 1.3 10e3/uL    Absolute Eosinophils 0.3 " 0.0 - 0.7 10e3/uL    Absolute Basophils 0.0 0.0 - 0.2 10e3/uL                   .  ..

## 2022-07-15 LAB
C PARVUM AG STL QL IA: NEGATIVE
G LAMBLIA AG STL QL IA: NEGATIVE

## 2022-07-20 DIAGNOSIS — G45.9 TRANSIENT CEREBRAL ISCHEMIA, UNSPECIFIED TYPE: ICD-10-CM

## 2022-07-20 DIAGNOSIS — I10 HYPERTENSION GOAL BP (BLOOD PRESSURE) < 150/90: Chronic | ICD-10-CM

## 2022-07-20 DIAGNOSIS — K21.9 GASTROESOPHAGEAL REFLUX DISEASE WITHOUT ESOPHAGITIS: Chronic | ICD-10-CM

## 2022-07-22 RX ORDER — CLOPIDOGREL BISULFATE 75 MG/1
TABLET ORAL
Qty: 90 TABLET | Refills: 2 | Status: SHIPPED | OUTPATIENT
Start: 2022-07-22 | End: 2022-12-30

## 2022-07-22 RX ORDER — PANTOPRAZOLE SODIUM 40 MG/1
40 TABLET, DELAYED RELEASE ORAL DAILY
Qty: 90 TABLET | Refills: 2 | Status: SHIPPED | OUTPATIENT
Start: 2022-07-22 | End: 2022-12-30

## 2022-07-22 RX ORDER — VERAPAMIL HYDROCHLORIDE 180 MG/1
180 TABLET, EXTENDED RELEASE ORAL 2 TIMES DAILY
Qty: 180 TABLET | Refills: 2 | Status: SHIPPED | OUTPATIENT
Start: 2022-07-22 | End: 2022-12-30

## 2022-07-22 NOTE — TELEPHONE ENCOUNTER
Prescription approved per [facility] Refill Protocol.    Sylvie Kumar RN

## 2022-08-08 DIAGNOSIS — I10 HYPERTENSION GOAL BP (BLOOD PRESSURE) < 150/90: Chronic | ICD-10-CM

## 2022-08-08 RX ORDER — LISINOPRIL/HYDROCHLOROTHIAZIDE 10-12.5 MG
1 TABLET ORAL DAILY
Qty: 90 TABLET | Refills: 2 | Status: SHIPPED | OUTPATIENT
Start: 2022-08-08 | End: 2023-06-08

## 2022-09-17 ENCOUNTER — HEALTH MAINTENANCE LETTER (OUTPATIENT)
Age: 82
End: 2022-09-17

## 2022-12-21 DIAGNOSIS — I10 HYPERTENSION GOAL BP (BLOOD PRESSURE) < 150/90: Chronic | ICD-10-CM

## 2022-12-21 DIAGNOSIS — E78.2 MIXED HYPERLIPIDEMIA: Chronic | ICD-10-CM

## 2022-12-22 RX ORDER — POTASSIUM CHLORIDE 1500 MG/1
TABLET, EXTENDED RELEASE ORAL
Qty: 90 TABLET | Refills: 1 | Status: SHIPPED | OUTPATIENT
Start: 2022-12-22 | End: 2023-03-29

## 2022-12-22 RX ORDER — ROSUVASTATIN CALCIUM 5 MG/1
TABLET, COATED ORAL
Qty: 90 TABLET | Refills: 1 | Status: SHIPPED | OUTPATIENT
Start: 2022-12-22 | End: 2023-03-29

## 2022-12-29 DIAGNOSIS — K21.9 GASTROESOPHAGEAL REFLUX DISEASE WITHOUT ESOPHAGITIS: Chronic | ICD-10-CM

## 2022-12-29 DIAGNOSIS — G45.9 TRANSIENT CEREBRAL ISCHEMIA, UNSPECIFIED TYPE: ICD-10-CM

## 2022-12-29 DIAGNOSIS — I10 HYPERTENSION GOAL BP (BLOOD PRESSURE) < 150/90: Chronic | ICD-10-CM

## 2022-12-30 RX ORDER — PANTOPRAZOLE SODIUM 40 MG/1
TABLET, DELAYED RELEASE ORAL
Qty: 100 TABLET | Refills: 0 | Status: SHIPPED | OUTPATIENT
Start: 2022-12-30 | End: 2023-03-29

## 2022-12-30 RX ORDER — CLOPIDOGREL BISULFATE 75 MG/1
TABLET ORAL
Qty: 100 TABLET | Refills: 1 | Status: SHIPPED | OUTPATIENT
Start: 2022-12-30 | End: 2023-07-05

## 2022-12-30 RX ORDER — VERAPAMIL HYDROCHLORIDE 180 MG/1
TABLET, EXTENDED RELEASE ORAL
Qty: 200 TABLET | Refills: 1 | Status: SHIPPED | OUTPATIENT
Start: 2022-12-30 | End: 2024-01-04

## 2023-04-01 DIAGNOSIS — I10 HYPERTENSION GOAL BP (BLOOD PRESSURE) < 150/90: Chronic | ICD-10-CM

## 2023-04-02 RX ORDER — LISINOPRIL/HYDROCHLOROTHIAZIDE 10-12.5 MG
1 TABLET ORAL DAILY
Qty: 100 TABLET | Refills: 2 | OUTPATIENT
Start: 2023-04-02

## 2023-04-20 ENCOUNTER — PATIENT OUTREACH (OUTPATIENT)
Dept: CARE COORDINATION | Facility: CLINIC | Age: 83
End: 2023-04-20
Payer: COMMERCIAL

## 2023-06-08 DIAGNOSIS — I10 HYPERTENSION GOAL BP (BLOOD PRESSURE) < 150/90: Chronic | ICD-10-CM

## 2023-06-08 RX ORDER — LISINOPRIL/HYDROCHLOROTHIAZIDE 10-12.5 MG
1 TABLET ORAL DAILY
Qty: 90 TABLET | Refills: 0 | Status: SHIPPED | OUTPATIENT
Start: 2023-06-08 | End: 2024-01-04

## 2023-06-10 DIAGNOSIS — I10 HYPERTENSION GOAL BP (BLOOD PRESSURE) < 150/90: Chronic | ICD-10-CM

## 2023-06-11 RX ORDER — LISINOPRIL/HYDROCHLOROTHIAZIDE 10-12.5 MG
TABLET ORAL
Qty: 70 TABLET | Refills: 4 | OUTPATIENT
Start: 2023-06-11

## 2023-07-03 DIAGNOSIS — G45.9 TRANSIENT CEREBRAL ISCHEMIA, UNSPECIFIED TYPE: ICD-10-CM

## 2023-07-03 DIAGNOSIS — E78.2 MIXED HYPERLIPIDEMIA: Chronic | ICD-10-CM

## 2023-07-03 DIAGNOSIS — I10 HYPERTENSION GOAL BP (BLOOD PRESSURE) < 150/90: Chronic | ICD-10-CM

## 2023-07-03 DIAGNOSIS — K21.9 GASTROESOPHAGEAL REFLUX DISEASE WITHOUT ESOPHAGITIS: Chronic | ICD-10-CM

## 2023-07-05 RX ORDER — ROSUVASTATIN CALCIUM 5 MG/1
TABLET, COATED ORAL
Qty: 100 TABLET | Refills: 2 | OUTPATIENT
Start: 2023-07-05

## 2023-07-05 RX ORDER — POTASSIUM CHLORIDE 1500 MG/1
TABLET, EXTENDED RELEASE ORAL
Qty: 100 TABLET | Refills: 0 | Status: SHIPPED | OUTPATIENT
Start: 2023-07-05 | End: 2024-01-04

## 2023-07-05 RX ORDER — CLOPIDOGREL BISULFATE 75 MG/1
TABLET ORAL
Qty: 100 TABLET | Refills: 0 | Status: SHIPPED | OUTPATIENT
Start: 2023-07-05 | End: 2024-01-04

## 2023-07-05 RX ORDER — VERAPAMIL HYDROCHLORIDE 180 MG/1
TABLET, EXTENDED RELEASE ORAL
Qty: 200 TABLET | Refills: 2 | OUTPATIENT
Start: 2023-07-05

## 2023-07-05 RX ORDER — PANTOPRAZOLE SODIUM 40 MG/1
TABLET, DELAYED RELEASE ORAL
Qty: 100 TABLET | Refills: 0 | Status: SHIPPED | OUTPATIENT
Start: 2023-07-05 | End: 2024-01-04

## 2023-08-01 ENCOUNTER — PATIENT OUTREACH (OUTPATIENT)
Dept: FAMILY MEDICINE | Facility: CLINIC | Age: 83
End: 2023-08-01
Payer: COMMERCIAL

## 2023-08-01 NOTE — LETTER
August 1, 2023    To  June R Yves  [address]   [place] [address]    Your team at [facility] cares about your health. We have reviewed your chart and based on our findings; we are making the following recommendations to better manage your health.     You are in particular need of attention regarding the following:     Schedule a DIABETIC FOLLOW UP appointment for Office Visit. Patients with diabetes should see their provider regularly.    If you have already completed these items, please contact the clinic via phone or   [facility] so your care team can review and update your records. Thank you for   choosing [facility] for your healthcare needs. For any questions,   concerns, or to schedule an appointment please contact our clinic.    Healthy Regards,      Your [facility] Care Team

## 2023-08-01 NOTE — TELEPHONE ENCOUNTER
Patient Quality Outreach    Patient is due for the following:   Diabetes -  Diabetic Follow-Up Visit    Next Steps:   Schedule a office visit for diabetes    Type of outreach:    Sent letter.      Questions for provider review:    None           Chula Centeno, CMA

## 2023-08-29 ENCOUNTER — TELEPHONE (OUTPATIENT)
Dept: FAMILY MEDICINE | Facility: CLINIC | Age: 83
End: 2023-08-29
Payer: COMMERCIAL

## 2023-08-29 DIAGNOSIS — I10 HYPERTENSION GOAL BP (BLOOD PRESSURE) < 150/90: Chronic | ICD-10-CM

## 2023-08-29 RX ORDER — LISINOPRIL/HYDROCHLOROTHIAZIDE 10-12.5 MG
1 TABLET ORAL DAILY
Qty: 90 TABLET | Refills: 3 | OUTPATIENT
Start: 2023-08-29

## 2023-08-29 NOTE — TELEPHONE ENCOUNTER
Unable to leave message on only phone number.  Mailed letter to patient instead.  Avis MENENDEZ    [facility]

## 2023-08-29 NOTE — LETTER
August 29, 2023    Swati Marinelli  [address]   [place] [address]    Dear June,     We recently received a refill request for Lisinopril and Hydrochlorathyazide.  It looks like you have established care with [facility]. If that is the case, they should be refilling your medications.  Please call and let us know if you have established care within the [facility] system.    Thank you.       Your [facility] Team/[facility]  [phone]

## 2023-08-29 NOTE — TELEPHONE ENCOUNTER
It looks like she has an appt today with a family practice doc in [facility]. Looks like she had a physical with them in May  I am assuming she has established care with them  If that is the case, they shoyuld be refilling the medication  Please call pt and let her know her new physician should be refilling her meds    Paris Martinez MD

## 2023-10-06 DIAGNOSIS — K21.9 GASTROESOPHAGEAL REFLUX DISEASE WITHOUT ESOPHAGITIS: Chronic | ICD-10-CM

## 2023-10-06 DIAGNOSIS — G45.9 TRANSIENT CEREBRAL ISCHEMIA, UNSPECIFIED TYPE: ICD-10-CM

## 2023-10-06 DIAGNOSIS — I10 HYPERTENSION GOAL BP (BLOOD PRESSURE) < 150/90: Chronic | ICD-10-CM

## 2023-10-08 RX ORDER — CLOPIDOGREL BISULFATE 75 MG/1
TABLET ORAL
Qty: 100 TABLET | Refills: 2 | OUTPATIENT
Start: 2023-10-08

## 2023-10-08 RX ORDER — PANTOPRAZOLE SODIUM 40 MG/1
TABLET, DELAYED RELEASE ORAL
Qty: 100 TABLET | Refills: 2 | OUTPATIENT
Start: 2023-10-08

## 2023-10-08 RX ORDER — POTASSIUM CHLORIDE 1500 MG/1
TABLET, EXTENDED RELEASE ORAL
Qty: 100 TABLET | Refills: 2 | OUTPATIENT
Start: 2023-10-08

## 2023-11-03 DIAGNOSIS — G45.9 TRANSIENT CEREBRAL ISCHEMIA, UNSPECIFIED TYPE: ICD-10-CM

## 2023-11-03 DIAGNOSIS — I10 HYPERTENSION GOAL BP (BLOOD PRESSURE) < 150/90: Chronic | ICD-10-CM

## 2023-11-03 DIAGNOSIS — K21.9 GASTROESOPHAGEAL REFLUX DISEASE WITHOUT ESOPHAGITIS: Chronic | ICD-10-CM

## 2023-11-03 RX ORDER — POTASSIUM CHLORIDE 1500 MG/1
TABLET, EXTENDED RELEASE ORAL
Qty: 100 TABLET | Refills: 0 | OUTPATIENT
Start: 2023-11-03

## 2023-11-03 RX ORDER — CLOPIDOGREL BISULFATE 75 MG/1
TABLET ORAL
Qty: 100 TABLET | Refills: 0 | OUTPATIENT
Start: 2023-11-03

## 2023-11-03 RX ORDER — PANTOPRAZOLE SODIUM 40 MG/1
40 TABLET, DELAYED RELEASE ORAL DAILY
Qty: 100 TABLET | Refills: 0 | OUTPATIENT
Start: 2023-11-03

## 2024-01-02 ENCOUNTER — TELEPHONE (OUTPATIENT)
Dept: FAMILY MEDICINE | Facility: CLINIC | Age: 84
End: 2024-01-02
Payer: COMMERCIAL

## 2024-01-02 NOTE — TELEPHONE ENCOUNTER
General Call      Reason for Call:  pt is calling for Cortizone shot for pain in knee right     What are your questions or concerns:  pt is schedule jan 30th but she is in pain can't really walk, and looking for primary care f/u    Date of last appointment with provider: went in today- [facility] ,     Could we send this information to you in [employer] or would you prefer to receive a phone call?:   Patient would prefer a phone call   Okay to leave a detailed message?: Yes at Home number on file [phone] (home)

## 2024-01-03 ENCOUNTER — TELEPHONE (OUTPATIENT)
Dept: FAMILY MEDICINE | Facility: CLINIC | Age: 84
End: 2024-01-03
Payer: COMMERCIAL

## 2024-01-03 NOTE — TELEPHONE ENCOUNTER
Patient is scheduled with Claudia Dasilva on 1/4/24 to establish care closer to home.  Avis MENENDEZ    [facility]

## 2024-01-03 NOTE — TELEPHONE ENCOUNTER
Please call patient. She is too early for her Medicare Wellness. That is not due until 5/23/2024 or later.    Claudia can still see her if she has other concerns.    Date of appointment: 1/4/2024        Sheree Farris MA

## 2024-01-03 NOTE — TELEPHONE ENCOUNTER
Could see if he can get in with luca guillermo sooner. Or sports med in [place] could also do it.     Paris Martinez MD

## 2024-01-03 NOTE — TELEPHONE ENCOUNTER
Spoke with patient's daughter, Deborah (Consent to Communicate on file) and patient would like to establish care with someone new at [facility].  Daughter, Deborah, will be coming with patient to appointment.  Avis MENENDEZ    [facility]

## 2024-01-04 ENCOUNTER — OFFICE VISIT (OUTPATIENT)
Dept: FAMILY MEDICINE | Facility: CLINIC | Age: 84
End: 2024-01-04
Payer: COMMERCIAL

## 2024-01-04 VITALS
DIASTOLIC BLOOD PRESSURE: 72 MMHG | SYSTOLIC BLOOD PRESSURE: 112 MMHG | HEIGHT: 61 IN | WEIGHT: 142.2 LBS | OXYGEN SATURATION: 93 % | HEART RATE: 82 BPM | BODY MASS INDEX: 26.85 KG/M2

## 2024-01-04 DIAGNOSIS — K21.9 GASTROESOPHAGEAL REFLUX DISEASE WITHOUT ESOPHAGITIS: Chronic | ICD-10-CM

## 2024-01-04 DIAGNOSIS — N18.32 STAGE 3B CHRONIC KIDNEY DISEASE (H): ICD-10-CM

## 2024-01-04 DIAGNOSIS — Z86.73 HISTORY OF TIA (TRANSIENT ISCHEMIC ATTACK): ICD-10-CM

## 2024-01-04 DIAGNOSIS — E78.2 MIXED HYPERLIPIDEMIA: Chronic | ICD-10-CM

## 2024-01-04 DIAGNOSIS — F33.9 DEPRESSION, RECURRENT (H): ICD-10-CM

## 2024-01-04 DIAGNOSIS — I10 HYPERTENSION GOAL BP (BLOOD PRESSURE) < 150/90: Primary | Chronic | ICD-10-CM

## 2024-01-04 DIAGNOSIS — M35.00 SICCA SYNDROME (H): ICD-10-CM

## 2024-01-04 PROBLEM — N18.30 CKD (CHRONIC KIDNEY DISEASE) STAGE 3, GFR 30-59 ML/MIN (H): Status: RESOLVED | Noted: 2019-07-24 | Resolved: 2024-01-04

## 2024-01-04 LAB
ANION GAP SERPL CALCULATED.3IONS-SCNC: 10 MMOL/L (ref 7–15)
BUN SERPL-MCNC: 27 MG/DL (ref 8–23)
CALCIUM SERPL-MCNC: 9.6 MG/DL (ref 8.8–10.2)
CHLORIDE SERPL-SCNC: 104 MMOL/L (ref 98–107)
CREAT SERPL-MCNC: 0.94 MG/DL (ref 0.51–0.95)
DEPRECATED HCO3 PLAS-SCNC: 27 MMOL/L (ref 22–29)
EGFRCR SERPLBLD CKD-EPI 2021: 60 ML/MIN/1.73M2
GLUCOSE SERPL-MCNC: 77 MG/DL (ref 70–99)
POTASSIUM SERPL-SCNC: 4.3 MMOL/L (ref 3.4–5.3)
SODIUM SERPL-SCNC: 141 MMOL/L (ref 135–145)

## 2024-01-04 PROCEDURE — 36415 COLL VENOUS BLD VENIPUNCTURE: CPT | Performed by: NURSE PRACTITIONER

## 2024-01-04 PROCEDURE — 80048 BASIC METABOLIC PNL TOTAL CA: CPT | Performed by: NURSE PRACTITIONER

## 2024-01-04 PROCEDURE — 99214 OFFICE O/P EST MOD 30 MIN: CPT | Performed by: NURSE PRACTITIONER

## 2024-01-04 RX ORDER — LISINOPRIL/HYDROCHLOROTHIAZIDE 10-12.5 MG
1 TABLET ORAL DAILY
Qty: 100 TABLET | Refills: 3 | Status: SHIPPED | OUTPATIENT
Start: 2024-01-04 | End: 2024-01-04

## 2024-01-04 RX ORDER — VERAPAMIL HYDROCHLORIDE 180 MG/1
180 TABLET, EXTENDED RELEASE ORAL 2 TIMES DAILY
Qty: 200 TABLET | Refills: 1 | Status: SHIPPED | OUTPATIENT
Start: 2024-01-04 | End: 2024-06-10

## 2024-01-04 RX ORDER — PANTOPRAZOLE SODIUM 40 MG/1
40 TABLET, DELAYED RELEASE ORAL DAILY
Qty: 100 TABLET | Refills: 3 | Status: SHIPPED | OUTPATIENT
Start: 2024-01-04

## 2024-01-04 RX ORDER — CLOPIDOGREL BISULFATE 75 MG/1
TABLET ORAL
Qty: 100 TABLET | Refills: 3 | Status: SHIPPED | OUTPATIENT
Start: 2024-01-04

## 2024-01-04 RX ORDER — LISINOPRIL/HYDROCHLOROTHIAZIDE 10-12.5 MG
1 TABLET ORAL DAILY
Qty: 100 TABLET | Refills: 3 | Status: SHIPPED | OUTPATIENT
Start: 2024-01-04

## 2024-01-04 RX ORDER — POTASSIUM CHLORIDE 1500 MG/1
TABLET, EXTENDED RELEASE ORAL
Qty: 100 TABLET | Refills: 3 | Status: SHIPPED | OUTPATIENT
Start: 2024-01-04

## 2024-01-04 RX ORDER — ROSUVASTATIN CALCIUM 5 MG/1
TABLET, COATED ORAL
Qty: 100 TABLET | Refills: 3 | Status: SHIPPED | OUTPATIENT
Start: 2024-01-04

## 2024-01-04 NOTE — PROGRESS NOTES
"  Assessment & Plan     Hypertension goal BP (blood pressure) < 150/90  Chronic, stable.  /72.  Will continue to monitor and if persistently on the low side will consider dose reduction.  She denies any dizziness/lightheadedness.   - lisinopril-hydrochlorothiazide (ZESTORETIC) 10-12.5 MG tablet; Take 1 tablet by mouth daily  - potassium chloride ER (KLOR-CON M) 20 MEQ CR tablet; TAKE 1 TABLET BY MOUTH  DAILY  - verapamil ER (CALAN-SR) 180 MG CR tablet; Take 1 tablet (180 mg) by mouth 2 times daily  - Basic metabolic panel  (Ca, Cl, CO2, Creat, Gluc, K, Na, BUN)    Depression, recurrent (H24)  Denies concerns today.  Not on medication.  Was referred to therapy in the past but did not go.  Continue to monitor.     Sicca syndrome (H24)  Denies concerns regarding this today.     Stage 3b chronic kidney disease (H)  BMP in process.  She is on lisinopril.  Continue to monitor periodically. Referral to nephrology if worsening.   - Basic metabolic panel  (Ca, Cl, CO2, Creat, Gluc, K, Na, BUN)    Mixed hyperlipidemia  Chronic, stable.  Check lipid panel at next visit in 6 months.   - rosuvastatin (CRESTOR) 5 MG tablet; TAKE 1 TABLET BY MOUTH  DAILY    History of TIA (transient ischemic attack)  Continue Plavix and Crestor.     - clopidogrel (PLAVIX) 75 MG tablet; TAKE 1 TABLET BY MOUTH  DAILY  - rosuvastatin (CRESTOR) 5 MG tablet; TAKE 1 TABLET BY MOUTH  DAILY    Gastroesophageal reflux disease without esophagitis  Chronic, stable.  Continue Protonix.     - pantoprazole (PROTONIX) 40 MG EC tablet; Take 1 tablet (40 mg) by mouth daily       BMI:   Estimated body mass index is 26.87 kg/m  as calculated from the following:    Height as of this encounter: 1.549 m (5' 1\").    Weight as of this encounter: 64.5 kg (142 lb 3.2 oz).           Claudia Dasilva [facility]    Subjective June is a 83 year old, presenting for the following health issues:  Establish Care        1/4/2024     6:52 AM " "  Additional Questions   Roomed by ismael   Accompanied by Daughter Deborah       History of Present Illness       Reason for visit:  Looking for a new primary care    She eats 2-3 servings of fruits and vegetables daily.She consumes 1 sweetened beverage(s) daily.She exercises with enough effort to increase her heart rate 9 or less minutes per day.  She exercises with enough effort to increase her heart rate 3 or less days per week.   She is taking medications regularly.       Patient is here with her daughter to establish care.   Patient [facility] the [facility]. Her   about a year ago.    Patient reports no concerns today.  She is feeling well.  Previously went to an [facility] clinic but is changing to be closer to home.     Has been having some right knee pain.  Seeing ortho tomorrow for a steroid injection.  She has a history of a left total knee replacement.        Review of Systems   Constitutional, HEENT, cardiovascular, pulmonary, gi and gu systems are negative, except as otherwise noted.      Objective    /72   Pulse 82   Ht 1.549 m (5' 1\")   Wt 64.5 kg (142 lb 3.2 oz)   SpO2 93%   BMI 26.87 kg/m    Body mass index is 26.87 kg/m .  Physical Exam   GENERAL: healthy, alert and no distress  EYES: Eyes grossly normal to inspection, PERRL and conjunctivae and sclerae normal  HENT: ear canals and TM's normal, nose and mouth without ulcers or lesions  RESP: lungs clear to auscultation - no rales, rhonchi or wheezes  CV: regular rate and rhythm, normal S1 S2, no S3 or S4, no murmur, click or rub, no peripheral edema and peripheral pulses strong  MS: no gross musculoskeletal defects noted, no edema  SKIN: no suspicious lesions or rashes  NEURO: Normal strength and tone, mentation intact and speech normal  PSYCH: mentation appears normal, affect normal/bright    Labs reviewed in Care Everywhere                  "

## 2024-01-12 ENCOUNTER — TELEPHONE (OUTPATIENT)
Dept: FAMILY MEDICINE | Facility: CLINIC | Age: 84
End: 2024-01-12

## 2024-01-12 NOTE — TELEPHONE ENCOUNTER
Okay for them to refill lisinopril-hydrochlorothiazide, patient has tolerated this medication for years with no side effects.  Claudia Dasilva, CNP

## 2024-01-22 ENCOUNTER — TELEPHONE (OUTPATIENT)
Dept: FAMILY MEDICINE | Facility: CLINIC | Age: 84
End: 2024-01-22
Payer: COMMERCIAL

## 2024-01-22 NOTE — TELEPHONE ENCOUNTER
Patient Quality Outreach    Patient is due for the following:   Depression  -  PHQ-9 needed    Next Steps:   Schedule a office visit for depression    Type of outreach:    Sent App[employer] message.      Questions for provider review:    None           Kayla Sanchez MA

## 2024-01-23 ENCOUNTER — MYC MEDICAL ADVICE (OUTPATIENT)
Dept: FAMILY MEDICINE | Facility: CLINIC | Age: 84
End: 2024-01-23
Payer: COMMERCIAL

## 2024-05-14 ENCOUNTER — OFFICE VISIT (OUTPATIENT)
Dept: URGENT CARE | Facility: URGENT CARE | Age: 84
End: 2024-05-14
Payer: COMMERCIAL

## 2024-05-14 VITALS
TEMPERATURE: 98.6 F | RESPIRATION RATE: 24 BRPM | WEIGHT: 145 LBS | HEART RATE: 105 BPM | SYSTOLIC BLOOD PRESSURE: 159 MMHG | BODY MASS INDEX: 27.4 KG/M2 | OXYGEN SATURATION: 96 % | DIASTOLIC BLOOD PRESSURE: 79 MMHG

## 2024-05-14 DIAGNOSIS — R42 LIGHTHEADEDNESS: ICD-10-CM

## 2024-05-14 DIAGNOSIS — J30.2 SEASONAL ALLERGIC RHINITIS, UNSPECIFIED TRIGGER: Primary | ICD-10-CM

## 2024-05-14 LAB
BASOPHILS # BLD AUTO: 0 10E3/UL (ref 0–0.2)
BASOPHILS NFR BLD AUTO: 0 %
EOSINOPHIL # BLD AUTO: 0.3 10E3/UL (ref 0–0.7)
EOSINOPHIL NFR BLD AUTO: 4 %
ERYTHROCYTE [DISTWIDTH] IN BLOOD BY AUTOMATED COUNT: 12.8 % (ref 10–15)
HCT VFR BLD AUTO: 47 % (ref 35–47)
HGB BLD-MCNC: 15.4 G/DL (ref 11.7–15.7)
IMM GRANULOCYTES # BLD: 0 10E3/UL
IMM GRANULOCYTES NFR BLD: 0 %
LYMPHOCYTES # BLD AUTO: 1.8 10E3/UL (ref 0.8–5.3)
LYMPHOCYTES NFR BLD AUTO: 25 %
MCH RBC QN AUTO: 29.3 PG (ref 26.5–33)
MCHC RBC AUTO-ENTMCNC: 32.8 G/DL (ref 31.5–36.5)
MCV RBC AUTO: 90 FL (ref 78–100)
MONOCYTES # BLD AUTO: 0.7 10E3/UL (ref 0–1.3)
MONOCYTES NFR BLD AUTO: 10 %
NEUTROPHILS # BLD AUTO: 4.4 10E3/UL (ref 1.6–8.3)
NEUTROPHILS NFR BLD AUTO: 61 %
PLATELET # BLD AUTO: 239 10E3/UL (ref 150–450)
RBC # BLD AUTO: 5.25 10E6/UL (ref 3.8–5.2)
WBC # BLD AUTO: 7.2 10E3/UL (ref 4–11)

## 2024-05-14 PROCEDURE — 36415 COLL VENOUS BLD VENIPUNCTURE: CPT | Performed by: PHYSICIAN ASSISTANT

## 2024-05-14 PROCEDURE — 80053 COMPREHEN METABOLIC PANEL: CPT | Performed by: PHYSICIAN ASSISTANT

## 2024-05-14 PROCEDURE — 85025 COMPLETE CBC W/AUTO DIFF WBC: CPT | Performed by: PHYSICIAN ASSISTANT

## 2024-05-14 PROCEDURE — 99213 OFFICE O/P EST LOW 20 MIN: CPT | Performed by: PHYSICIAN ASSISTANT

## 2024-05-14 RX ORDER — CETIRIZINE HYDROCHLORIDE 10 MG/1
10 TABLET ORAL DAILY
Qty: 30 TABLET | Refills: 0 | Status: SHIPPED | OUTPATIENT
Start: 2024-05-14

## 2024-05-14 ASSESSMENT — ENCOUNTER SYMPTOMS
SHORTNESS OF BREATH: 0
ABDOMINAL PAIN: 0
DIARRHEA: 0
LIGHT-HEADEDNESS: 1
COUGH: 1
APPETITE CHANGE: 0
CHEST TIGHTNESS: 1
VOMITING: 0
FEVER: 0
DIZZINESS: 1
RHINORRHEA: 1
SORE THROAT: 0
NAUSEA: 0
HEADACHES: 0
WHEEZING: 0

## 2024-05-14 NOTE — PROGRESS NOTES
SUBJECTIVE:   Swati Marinelli is a 83 year old female presenting with a chief complaint of   Chief Complaint   Patient presents with     Urgent Care     c/o feeling faint that started over the weekend.          She is an established patient of [facility].  Patient presents with dizziness since yesterday.   Stuffy runny nose, clear x 3 days.   Nonproductive cough, small.  Scratchy throat.  Patient states over and over that she feels she is going to faint. Difficult to differentiat if she is dizzy or lightheaded. Patient appears to be somewhat confused/poor historian.      Treatment:  nothing    Review of Systems   Constitutional:  Negative for appetite change and fever.   HENT:  Positive for congestion and rhinorrhea. Negative for ear pain and sore throat.    Respiratory:  Positive for cough and chest tightness. Negative for shortness of breath and wheezing.    Cardiovascular:  Negative for chest pain.   Gastrointestinal:  Negative for abdominal pain, diarrhea, nausea and vomiting.   Genitourinary: Negative.    Neurological:  Positive for dizziness and light-headedness. Negative for headaches.   All other systems reviewed and are negative.      Past Medical History:   Diagnosis Date     GERD (gastroesophageal reflux disease)      Hypertension goal BP (blood pressure) < 150/90 11/14/2017     Mixed hyperlipidemia 11/14/2017     MANNY (obstructive sleep apnea)- 'moderate' (AHI 19) 11/14/2017    Initial diagnosis ?[facility], [place].  Study Date: 8/17/2018- (154.0 lbs) It was frequently difficult to discern between RERAs and PLMs. The combined apnea/hypopnea index was 19.9 events per hour (central apnea/hypopnea index was 0.5 events per hour). The REM AHI was 74.4 events per hour. The supine AHI was n/a.  RDI was 49.0 events per hour. Baseline oxygen saturation was 91.6%. Lowest oxygen saturatio     TIA (transient ischemic attack) 2010    Possible TIA approximately 2010. Occular symptoms in left eye, likely occipital      Family  History   Problem Relation Age of Onset     Tuberculosis Mother      Myocardial Infarction Father      Coronary Artery Disease Father      Myocardial Infarction Brother      Coronary Artery Disease Brother      Heart Surgery Daughter      Diabetes No family hx of      Colon Cancer No family hx of      Breast Cancer No family hx of      Glaucoma No family hx of      Macular Degeneration No family hx of      Current Outpatient Medications   Medication Sig Dispense Refill     cetirizine (ZYRTEC) 10 MG tablet Take 1 tablet (10 mg) by mouth daily 30 tablet 0     clopidogrel (PLAVIX) 75 MG tablet TAKE 1 TABLET BY MOUTH  DAILY 100 tablet 3     lisinopril-hydrochlorothiazide (ZESTORETIC) 10-12.5 MG tablet Take 1 tablet by mouth daily 100 tablet 3     meclizine (ANTIVERT) 25 MG tablet Take 1 tablet (25 mg) by mouth every 6 hours as needed for dizziness 90 tablet 3     order for DME autoCPAP 7-13 cmH20 (Patient not taking: Reported on 1/4/2024) 1 Device 0     pantoprazole (PROTONIX) 40 MG EC tablet Take 1 tablet (40 mg) by mouth daily 100 tablet 3     potassium chloride ER (KLOR-CON M) 20 MEQ CR tablet TAKE 1 TABLET BY MOUTH  DAILY 100 tablet 3     rosuvastatin (CRESTOR) 5 MG tablet TAKE 1 TABLET BY MOUTH  DAILY 100 tablet 3     verapamil ER (CALAN-SR) 180 MG CR tablet Take 1 tablet (180 mg) by mouth 2 times daily 200 tablet 1     Social History     Tobacco Use     Smoking status: Never     Smokeless tobacco: Never   Substance Use Topics     Alcohol use: No       OBJECTIVE  BP (!) 159/79   Pulse 105   Temp 98.6  F (37  C) (Oral)   Resp 24   Wt 65.8 kg (145 lb)   SpO2 96%   BMI 27.40 kg/m      Physical Exam  Vitals and nursing note reviewed.   Constitutional:       Appearance: Normal appearance. She is normal weight.   HENT:      Head: Normocephalic and atraumatic.      Right Ear: Tympanic membrane, ear canal and external ear normal. There is impacted cerumen.      Left Ear: Tympanic membrane, ear canal and external ear  normal.      Nose: Nose normal.      Mouth/Throat:      Mouth: Mucous membranes are moist.      Pharynx: Oropharynx is clear.   Eyes:      Extraocular Movements: Extraocular movements intact.      Conjunctiva/sclera: Conjunctivae normal.      Comments: No nystagmus     Cardiovascular:      Rate and Rhythm: Normal rate and regular rhythm.      Pulses: Normal pulses.      Heart sounds: Normal heart sounds.   Pulmonary:      Effort: Pulmonary effort is normal.      Breath sounds: Normal breath sounds.   Musculoskeletal:      Cervical back: Normal range of motion.   Skin:     General: Skin is warm and dry.      Findings: No rash.   Neurological:      General: No focal deficit present.      Mental Status: She is alert.   Psychiatric:         Mood and Affect: Mood normal.         Behavior: Behavior normal.       Labs:  Results for orders placed or performed in visit on 05/14/24 (from the past 24 hour(s))   CBC with platelets and differential    Narrative    The following orders were created for panel order CBC with platelets and differential.  Procedure                               Abnormality         Status                     ---------                               -----------         ------                     CBC with platelets and d...[[id number]]  Abnormal            Final result                 Please view results for these tests on the individual orders.   CBC with platelets and differential   Result Value Ref Range    WBC Count 7.2 4.0 - 11.0 10e3/uL    RBC Count 5.25 (H) 3.80 - 5.20 10e6/uL    Hemoglobin 15.4 11.7 - 15.7 g/dL    Hematocrit 47.0 35.0 - 47.0 %    MCV 90 78 - 100 fL    MCH 29.3 26.5 - 33.0 pg    MCHC 32.8 31.5 - 36.5 g/dL    RDW 12.8 10.0 - 15.0 %    Platelet Count 239 150 - 450 10e3/uL    % Neutrophils 61 %    % Lymphocytes 25 %    % Monocytes 10 %    % Eosinophils 4 %    % Basophils 0 %    % Immature Granulocytes 0 %    Absolute Neutrophils 4.4 1.6 - 8.3 10e3/uL    Absolute Lymphocytes 1.8 0.8 -  5.3 10e3/uL    Absolute Monocytes 0.7 0.0 - 1.3 10e3/uL    Absolute Eosinophils 0.3 0.0 - 0.7 10e3/uL    Absolute Basophils 0.0 0.0 - 0.2 10e3/uL    Absolute Immature Granulocytes 0.0 <=0.4 10e3/uL       ASSESSMENT:      ICD-10-CM    1. Seasonal allergic rhinitis, unspecified trigger  J30.2 cetirizine (ZYRTEC) 10 MG tablet      2. Lightheadedness  R42 CBC with platelets and differential     Comprehensive metabolic panel (BMP + Alb, Alk Phos, ALT, AST, Total. Bili, TP)     CBC with platelets and differential     Comprehensive metabolic panel (BMP + Alb, Alk Phos, ALT, AST, Total. Bili, TP)           Medical Decision Making:    Differential Diagnosis:  OM, seasonal allergies, electrolyte imbalances    Serious Comorbid Conditions:  Reviewed      PLAN:    Patient overall does not demonstrate any red flag signs.  She has no nystagmus and balance appears normal.  Not clear on etiology.  CMP pending.  Will call with any abnormal labs.  Discussed reasons to seek immediate medical attention.  Additionally if no improvement or worsening in one week, may follow up with PCP and/or UC.        Followup:    If not improving or if condition worsens, follow up with your Primary Care Provider, If not improving or if conditions worsens over the next 12-24 hours, go to the Emergency Department    There are no Patient Instructions on file for this visit.

## 2024-05-15 LAB
ALBUMIN SERPL BCG-MCNC: 4.4 G/DL (ref 3.5–5.2)
ALP SERPL-CCNC: 72 U/L (ref 40–150)
ALT SERPL W P-5'-P-CCNC: 18 U/L (ref 0–50)
ANION GAP SERPL CALCULATED.3IONS-SCNC: 10 MMOL/L (ref 7–15)
AST SERPL W P-5'-P-CCNC: 20 U/L (ref 0–45)
BILIRUB SERPL-MCNC: 0.6 MG/DL
BUN SERPL-MCNC: 19.4 MG/DL (ref 8–23)
CALCIUM SERPL-MCNC: 9.5 MG/DL (ref 8.8–10.2)
CHLORIDE SERPL-SCNC: 102 MMOL/L (ref 98–107)
CREAT SERPL-MCNC: 0.98 MG/DL (ref 0.51–0.95)
DEPRECATED HCO3 PLAS-SCNC: 29 MMOL/L (ref 22–29)
EGFRCR SERPLBLD CKD-EPI 2021: 57 ML/MIN/1.73M2
GLUCOSE SERPL-MCNC: 83 MG/DL (ref 70–99)
POTASSIUM SERPL-SCNC: 4.7 MMOL/L (ref 3.4–5.3)
PROT SERPL-MCNC: 7.4 G/DL (ref 6.4–8.3)
SODIUM SERPL-SCNC: 141 MMOL/L (ref 135–145)

## 2024-06-08 DIAGNOSIS — I10 HYPERTENSION GOAL BP (BLOOD PRESSURE) < 150/90: Chronic | ICD-10-CM

## 2024-06-10 RX ORDER — VERAPAMIL HYDROCHLORIDE 180 MG/1
180 TABLET, EXTENDED RELEASE ORAL 2 TIMES DAILY
Qty: 200 TABLET | Refills: 2 | Status: SHIPPED | OUTPATIENT
Start: 2024-06-10

## 2024-06-11 ENCOUNTER — PATIENT OUTREACH (OUTPATIENT)
Dept: FAMILY MEDICINE | Facility: CLINIC | Age: 84
End: 2024-06-11
Payer: COMMERCIAL

## 2024-06-11 NOTE — TELEPHONE ENCOUNTER
Patient Quality Outreach    Patient is due for the following:   Physical Annual Wellness Visit    Next Steps:   Patient has upcoming appointment, these items will be addressed at that time.    Type of outreach:          Questions for provider review:               Sheree Farris CMA

## 2024-06-17 PROBLEM — Z71.89 ADVANCED DIRECTIVES, COUNSELING/DISCUSSION: Status: RESOLVED | Noted: 2017-11-14 | Resolved: 2024-06-17

## 2024-07-11 ENCOUNTER — OFFICE VISIT (OUTPATIENT)
Dept: FAMILY MEDICINE | Facility: CLINIC | Age: 84
End: 2024-07-11
Payer: COMMERCIAL

## 2024-07-11 VITALS
BODY MASS INDEX: 27.38 KG/M2 | TEMPERATURE: 97.9 F | HEART RATE: 95 BPM | WEIGHT: 145 LBS | SYSTOLIC BLOOD PRESSURE: 124 MMHG | OXYGEN SATURATION: 98 % | HEIGHT: 61 IN | DIASTOLIC BLOOD PRESSURE: 77 MMHG

## 2024-07-11 DIAGNOSIS — E78.2 MIXED HYPERLIPIDEMIA: ICD-10-CM

## 2024-07-11 DIAGNOSIS — I10 HYPERTENSION GOAL BP (BLOOD PRESSURE) < 150/90: Chronic | ICD-10-CM

## 2024-07-11 DIAGNOSIS — Z86.73 HISTORY OF TIA (TRANSIENT ISCHEMIC ATTACK): ICD-10-CM

## 2024-07-11 DIAGNOSIS — F33.9 DEPRESSION, RECURRENT (H): ICD-10-CM

## 2024-07-11 DIAGNOSIS — G89.29 CHRONIC PAIN OF RIGHT KNEE: ICD-10-CM

## 2024-07-11 DIAGNOSIS — M25.561 CHRONIC PAIN OF RIGHT KNEE: ICD-10-CM

## 2024-07-11 DIAGNOSIS — Z00.00 ENCOUNTER FOR MEDICARE ANNUAL WELLNESS EXAM: Primary | ICD-10-CM

## 2024-07-11 LAB
CHOLEST SERPL-MCNC: 157 MG/DL
FASTING STATUS PATIENT QL REPORTED: YES
HDLC SERPL-MCNC: 36 MG/DL
LDLC SERPL CALC-MCNC: 96 MG/DL
NONHDLC SERPL-MCNC: 121 MG/DL
TRIGL SERPL-MCNC: 126 MG/DL

## 2024-07-11 PROCEDURE — 99214 OFFICE O/P EST MOD 30 MIN: CPT | Mod: 25 | Performed by: NURSE PRACTITIONER

## 2024-07-11 PROCEDURE — 36415 COLL VENOUS BLD VENIPUNCTURE: CPT | Performed by: NURSE PRACTITIONER

## 2024-07-11 PROCEDURE — G0439 PPPS, SUBSEQ VISIT: HCPCS | Performed by: NURSE PRACTITIONER

## 2024-07-11 PROCEDURE — 80061 LIPID PANEL: CPT | Performed by: NURSE PRACTITIONER

## 2024-07-11 SDOH — HEALTH STABILITY: PHYSICAL HEALTH: ON AVERAGE, HOW MANY DAYS PER WEEK DO YOU ENGAGE IN MODERATE TO STRENUOUS EXERCISE (LIKE A BRISK WALK)?: 0 DAYS

## 2024-07-11 ASSESSMENT — PATIENT HEALTH QUESTIONNAIRE - PHQ9
10. IF YOU CHECKED OFF ANY PROBLEMS, HOW DIFFICULT HAVE THESE PROBLEMS MADE IT FOR YOU TO DO YOUR WORK, TAKE CARE OF THINGS AT HOME, OR GET ALONG WITH OTHER PEOPLE: NOT DIFFICULT AT ALL
SUM OF ALL RESPONSES TO PHQ QUESTIONS 1-9: 1
SUM OF ALL RESPONSES TO PHQ QUESTIONS 1-9: 1

## 2024-07-11 ASSESSMENT — SOCIAL DETERMINANTS OF HEALTH (SDOH): HOW OFTEN DO YOU GET TOGETHER WITH FRIENDS OR RELATIVES?: MORE THAN THREE TIMES A WEEK

## 2024-07-11 NOTE — PATIENT INSTRUCTIONS
Right knee- follow-up with your orthopedic provider  Alexandre Evans MD   [address] [address] [address]   Phone: [phone]          Patient Education   Preventive Care Advice   This is general advice given by our system to help you stay healthy. However, your care team may have specific advice just for you. Please talk to your care team about your preventive care needs.  Nutrition  Eat 5 or more servings of fruits and vegetables each day.  Try wheat bread, brown rice and whole grain pasta (instead of white bread, rice, and pasta).  Get enough calcium and vitamin D. Check the label on foods and aim for 100% of the RDA (recommended daily allowance).  Lifestyle  Exercise at least 150 minutes each week  (30 minutes a day, 5 days a week).  Do muscle strengthening activities 2 days a week. These help control your weight and prevent disease.  No smoking.  Wear sunscreen to prevent skin cancer.  Have a dental exam and cleaning every 6 months.  Yearly exams  See your health care team every year to talk about:  Any changes in your health.  Any medicines your care team has prescribed.  Preventive care, family planning, and ways to prevent chronic diseases.  Shots (vaccines)   HPV shots (up to age 26), if you've never had them before.  Hepatitis B shots (up to age 59), if you've never had them before.  COVID-19 shot: Get this shot when it's due.  Flu shot: Get a flu shot every year.  Tetanus shot: Get a tetanus shot every 10 years.  Pneumococcal, hepatitis A, and RSV shots: Ask your care team if you need these based on your risk.  Shingles shot (for age 50 and up)  General health tests  Diabetes screening:  Starting at age 35, Get screened for diabetes at least every 3 years.  If you are younger than age 35, ask your care team if you should be screened for diabetes.  Cholesterol test: At age 39, start having a cholesterol test every 5 years, or more often if advised.  Bone density scan  (DEXA): At age 50, ask your care team if you should have this scan for osteoporosis (brittle bones).  Hepatitis C: Get tested at least once in your life.  STIs (sexually transmitted infections)  Before age 24: Ask your care team if you should be screened for STIs.  After age 24: Get screened for STIs if you're at risk. You are at risk for STIs (including HIV) if:  You are sexually active with more than one person.  You don't use condoms every time.  You or a partner was diagnosed with a sexually transmitted infection.  If you are at risk for HIV, ask about PrEP medicine to prevent HIV.  Get tested for HIV at least once in your life, whether you are at risk for HIV or not.  Cancer screening tests  Cervical cancer screening: If you have a cervix, begin getting regular cervical cancer screening tests starting at age 21.  Breast cancer scan (mammogram): If you've ever had breasts, begin having regular mammograms starting at age 40. This is a scan to check for breast cancer.  Colon cancer screening: It is important to start screening for colon cancer at age 45.  Have a colonoscopy test every 10 years (or more often if you're at risk) Or, ask your provider about stool tests like a FIT test every year or Cologuard test every 3 years.  To learn more about your testing options, visit:   .  For help making a decision, visit:   https://bit.ly/oc77269.  Prostate cancer screening test: If you have a prostate, ask your care team if a prostate cancer screening test (PSA) at age 55 is right for you.  Lung cancer screening: If you are a current or former smoker ages 50 to 80, ask your care team if ongoing lung cancer screenings are right for you.  For informational purposes only. Not to replace the advice of your health care provider. Copyright   2023 [employer]. All rights reserved. Clinically reviewed by the [facility]. [employer] 517177 - REV 01/24.

## 2024-07-11 NOTE — PROGRESS NOTES
"Preventive Care Visit  [facility]  Claudia Dasilva, [place],    Jul 11, 2024      Assessment & Plan     Encounter for Medicare annual wellness exam  Continue annual exams.     Mixed hyperlipidemia  Lipid panel in process.  Continue Crestor.   - Lipid panel reflex to direct LDL Fasting; Future    Hypertension goal BP (blood pressure) < 150/90  Chronic, stable.  Continue lisinopril-hydrochlorothiazide and verapamil.       History of TIA (transient ischemic attack)  Continue Plavix.      Depression, recurrent (H24)  Chronic, stable.  Not on medication, continue to monitor.      Chronic pain of right knee  She will follow-up with her current orthopedic provider.           Patient has been advised of split billing requirements and indicates understanding: Yes        BMI  Estimated body mass index is 27.4 kg/m  as calculated from the following:    Height as of this encounter: 1.549 m (5' 1\").    Weight as of this encounter: 65.8 kg (145 lb).       Counseling  Appropriate preventive services were discussed with this patient, including applicable screening as appropriate for fall prevention, nutrition, physical activity, Tobacco-use cessation, weight loss and cognition.  Checklist reviewing preventive services available has been given to the patient.  Reviewed patient's diet, addressing concerns and/or questions.       Paola Longoria is a 84 year old, presenting for the following:  Medicare Visit        7/11/2024     7:18 AM   Additional Questions   Roomed by ismael   Accompanied by daughter     Health Care Directive  Patient does not have a Health Care Directive or Living Will: No    HPI      Having a lot of pain in her right knee.  States had a gel injection last time and it didn't help at all.  Steroid injections always worked.  Likes to walk for exercise but knee pain is keeping her from doing that.           7/11/2024   General Health   How would you rate your overall physical health? (!) FAIR "   Feel stress (tense, anxious, or unable to sleep) Not at all            7/11/2024   Nutrition   Diet: Regular (no restrictions)            7/11/2024   Exercise   Days per week of moderate/strenous exercise 0 days      (!) EXERCISE CONCERN      7/11/2024   Social Factors   Frequency of gathering with friends or relatives More than three times a week   Worry food won't last until get money to buy more No   Food not last or not have enough money for food? No   Do you have housing? (Housing is defined as stable permanent housing and does not include staying ouside in a car, in a tent, in an abandoned building, in an overnight shelter, or couch-surfing.) Yes   Are you worried about losing your housing? No   Lack of transportation? No   Unable to get utilities (heat,electricity)? No            7/11/2024   Fall Risk   Fallen 2 or more times in the past year? No   Trouble with walking or balance? Yes   Reason Gait Speed Test Not Completed Unable to complete test, patient reported symptoms   Gait Speed Test Interpretation Greater than 5.01 seconds - ABNORMAL             7/11/2024   Activities of Daily Living- Home Safety   Needs help with the following daily activites None of the above   Safety concerns in the home None of the above            7/11/2024   Dental   Dentist two times every year? Yes            7/11/2024   Hearing Screening   Hearing concerns? None of the above            7/11/2024   Driving Risk Screening   Patient/family members have concerns about driving No            7/11/2024   General Alertness/Fatigue Screening   Have you been more tired than usual lately? No            7/11/2024   Urinary Incontinence Screening   Bothered by leaking urine in past 6 months No            7/11/2024   TB Screening   Were you born outside of the [place]? No          Today's PHQ-9 Score:       7/11/2024     7:07 AM   PHQ-9 SCORE   PHQ-9 Total Score MyChart 1 (Minimal depression)   PHQ-9 Total Score 1         7/11/2024    Substance Use   Alcohol more than 3/day or more than 7/wk Not Applicable   Do you have a current opioid prescription? No   How severe/bad is pain from 1 to 10? 9/10   Do you use any other substances recreationally? No        Social History     Tobacco Use    Smoking status: Never    Smokeless tobacco: Never   Vaping Use    Vaping status: Never Used   Substance Use Topics    Alcohol use: No    Drug use: No         6/16/2022   LAST FHS-7 RESULTS   1st degree relative breast or ovarian cancer No   Any relative bilateral breast cancer No   Any male have breast cancer No   Any ONE woman have BOTH breast AND ovarian cancer No   Any woman with breast cancer before 50yrs No   2 or more relatives with breast AND/OR ovarian cancer No   2 or more relatives with breast AND/OR bowel cancer No      Mammogram Screening - After age 74- determine frequency with patient based on health status, life expectancy and patient goals      Reviewed and updated as needed this visit by Provider   Tobacco  Allergies  Meds  Problems  Med Hx  Surg Hx  Fam Hx              Current providers sharing in care for this patient include:  Patient Care Team:  No Ref-Primary, Physician as PCP - Claudia Silva CNP as Assigned PCP    The following health maintenance items are reviewed in Epic and correct as of today:  Health Maintenance   Topic Date Due    DEPRESSION ACTION PLAN  Never done    RSV VACCINE (Pregnancy & 60+) (1 - 1-dose 60+ series) Never done    LIPID  04/18/2023    ANNUAL REVIEW OF HM ORDERS  04/18/2023    COVID-19 Vaccine (8 - 2023-24 season) 02/04/2024    INFLUENZA VACCINE (1) 09/01/2024    BMP  11/14/2024    PHQ-9  01/11/2025    MEDICARE ANNUAL WELLNESS VISIT  07/11/2025    FALL RISK ASSESSMENT  07/11/2025    ADVANCE CARE PLANNING  04/18/2027    DTAP/TDAP/TD IMMUNIZATION (2 - Td or Tdap) 07/12/2028    DEXA  11/21/2031    Pneumococcal Vaccine: 65+ Years  Completed    ZOSTER IMMUNIZATION  Completed    IPV  "IMMUNIZATION  Aged Out    HPV IMMUNIZATION  Aged Out    MENINGITIS IMMUNIZATION  Aged Out    RSV MONOCLONAL ANTIBODY  Aged Out         Review of Systems  Constitutional, neuro, ENT, endocrine, pulmonary, cardiac, gastrointestinal, genitourinary, musculoskeletal, integument and psychiatric systems are negative, except as otherwise noted.     Objective    Exam  /77   Pulse 95   Temp 97.9  F (36.6  C)   Ht 1.549 m (5' 1\")   Wt 65.8 kg (145 lb)   SpO2 98%   BMI 27.40 kg/m     Estimated body mass index is 27.4 kg/m  as calculated from the following:    Height as of this encounter: 1.549 m (5' 1\").    Weight as of this encounter: 65.8 kg (145 lb).    Physical Exam  GENERAL: alert and no distress  EYES: Eyes grossly normal to inspection, PERRL and conjunctivae and sclerae normal  HENT: ear canals and TM's normal, nose and mouth without ulcers or lesions  NECK: no adenopathy, no asymmetry, masses, or scars  RESP: lungs clear to auscultation - no rales, rhonchi or wheezes  CV: regular rate and rhythm, normal S1 S2, no S3 or S4, no murmur, click or rub, no peripheral edema  MS: no gross musculoskeletal defects noted, no edema  SKIN: no suspicious lesions or rashes  NEURO: Normal strength and tone, mentation intact and speech normal  PSYCH: mentation appears normal, affect normal/bright        7/11/2024   Mini Cog   Mini-Cog Not Completed (choose reason) Patient declines        Minicog- Normal cognition based on my direct observation during interview and exam.        Signed Electronically by: Claudia Dasilva CNP    Answers submitted by the patient for this visit:  Patient Health Questionnaire (Submitted on 7/11/2024)  If you checked off any problems, how difficult have these problems made it for you to do your work, take care of things at home, or get along with other people?: Not difficult at all  PHQ9 TOTAL SCORE: 1    "

## 2024-07-16 ENCOUNTER — MYC MEDICAL ADVICE (OUTPATIENT)
Dept: FAMILY MEDICINE | Facility: CLINIC | Age: 84
End: 2024-07-16
Payer: COMMERCIAL

## 2024-07-25 ENCOUNTER — OFFICE VISIT (OUTPATIENT)
Dept: FAMILY MEDICINE | Facility: CLINIC | Age: 84
End: 2024-07-25
Payer: COMMERCIAL

## 2024-07-25 VITALS
BODY MASS INDEX: 27.78 KG/M2 | TEMPERATURE: 98.5 F | DIASTOLIC BLOOD PRESSURE: 72 MMHG | SYSTOLIC BLOOD PRESSURE: 130 MMHG | OXYGEN SATURATION: 95 % | RESPIRATION RATE: 20 BRPM | WEIGHT: 147 LBS | HEART RATE: 96 BPM

## 2024-07-25 DIAGNOSIS — R21 RASH AND NONSPECIFIC SKIN ERUPTION: Primary | ICD-10-CM

## 2024-07-25 PROCEDURE — 99213 OFFICE O/P EST LOW 20 MIN: CPT | Performed by: PHYSICIAN ASSISTANT

## 2024-07-25 RX ORDER — TRIAMCINOLONE ACETONIDE 1 MG/G
CREAM TOPICAL
Qty: 30 G | Refills: 1 | Status: SHIPPED | OUTPATIENT
Start: 2024-07-25

## 2024-07-25 ASSESSMENT — PAIN SCALES - GENERAL: PAINLEVEL: NO PAIN (0)

## 2024-07-25 NOTE — LETTER
My Depression Action Plan  Name: Swati Marinelli   Date of Birth 1940  Date: 7/25/2024    My doctor: No Ref-Primary, Physician   My clinic: [facility]  [id number] [place] [id number] [phone]            GREEN    ZONE   Good Control    What it looks like:   Things are going generally well. You have normal up s and down s. You may even feel depressed from time to time, but bad moods usually last less than a day.   What you need to do:  Continue to care for yourself (see self care plan)  Check your depression survival kit and update it as needed  Follow your physician s recommendations including any medication.  Do not stop taking medication unless you consult with your physician first.             YELLOW         ZONE Getting Worse    What it looks like:   Depression is starting to interfere with your life.   It may be hard to get out of bed; you may be starting to isolate yourself from others.  Symptoms of depression are starting to last most all day and this has happened for several days.   You may have suicidal thoughts but they are not constant.   What you need to do:     Call your care team, your response to treatment will improve if you keep your care team informed of your progress. Yellow periods are signs an adjustment may need to be made.     Continue your self-care, even if you have to fake it!    Talk to someone in your support network    Open up your depression survival kit             RED    ZONE Medical Alert - Get Help    What it looks like:   Depression is seriously interfering with your life.   You may experience these or other symptoms: You can t get out of bed most days, can t work or engage in other necessary activities, you have trouble taking care of basic hygiene, or basic responsibilities, thoughts of suicide or death that will not go away, self-injurious behavior.     What you need to do:  Call your care team and request a same-day appointment. If they  are not available (weekends or after hours) call your local crisis line, emergency room or 911.      Electronically signed by: Jeff Alexander MA, July 25, 2024    Depression Self Care Plan / Survival Kit    Self-Care for Depression  Here s the deal. Your body and mind are really not as separate as most people think.  What you do and think affects how you feel and how you feel influences what you do and think. This means if you do things that people who feel good do, it will help you feel better.  Sometimes this is all it takes.  There is also a place for medication and therapy depending on how severe your depression is, so be sure to consult with your medical provider and/ or Behavioral Health Consultant if your symptoms are worsening or not improving.     In order to better manage my stress, I will:    Exercise  Get some form of exercise, every day. This will help reduce pain and release endorphins, the  feel good  chemicals in your brain. This is almost as good as taking antidepressants!  This is not the same as joining a gym and then never going! (they count on that by the way ) It can be as simple as just going for a walk or doing some gardening, anything that will get you moving.      Hygiene   Maintain good hygiene (Get out of bed in the morning, Make your bed, Brush your teeth, Take a shower, and Get dressed like you were going to work, even if you are unemployed).  If your clothes don't fit try to get ones that do.    Diet  I will strive to eat foods that are good for me, drink plenty of water, and avoid excessive sugar, caffeine, alcohol, and other mood-altering substances.  Some foods that are helpful in depression are: complex carbohydrates, B vitamins, flaxseed, fish or fish oil, fresh fruits and vegetables.    Psychotherapy  I agree to participate in Individual Therapy (if recommended).    Medication  If prescribed medications, I agree to take them.  Missing doses can result in serious side effects.  I  understand that drinking alcohol, or other illicit drug use, may cause potential side effects.  I will not stop my medication abruptly without first discussing it with my provider.    Staying Connected With Others  I will stay in touch with my friends, family members, and my primary care provider/team.    Use your imagination  Be creative.  We all have a creative side; it doesn t matter if it s oil painting, sand castles, or mud pies! This will also kick up the endorphins.    Witness Beauty  (AKA stop and smell the roses) Take a look outside, even in mid-winter. Notice colors, textures. Watch the squirrels and birds.     Service to others  Be of service to others.  There is always someone else in need.  By helping others we can  get out of ourselves  and remember the really important things.  This also provides opportunities for practicing all the other parts of the program.    Humor  Laugh and be silly!  Adjust your TV habits for less news and crime-drama and more comedy.    Control your stress  Try breathing deep, massage therapy, biofeedback, and meditation. Find time to relax each day.     My support system    Clinic Contact:  Phone number:    Contact 1:  Phone number:    Contact 2:  Phone number:    Ep[employer]opalian/:  Phone number:    Therapist:  Phone number:    Local crisis center:    Phone number:    Other community support:  Phone number:

## 2024-07-25 NOTE — PATIENT INSTRUCTIONS
You can use zyrtec 10 mg, claritin 10 mg or allegra 180 mg once daily for itch if needed        Try to keep your skin well hydrated with lotion daily    Try to avoid any scented soap products or laundry detergents.

## 2024-07-25 NOTE — PROGRESS NOTES
Assessment & Plan     Rash and nonspecific skin eruption  She is going to treat with triamcinolone cream.   Anti-histamine to help with the itching.   Referral to establish with Dermatology for regular skin checks given also. This rash will be resolved with the above treatments before she is established in Dermatology. She plans to establish with Derm to have regular skin checks with her history of skin cancer.   - triamcinolone (KENALOG) 0.1 % external cream; Apply to rash area twice daily, avoid face  - Adult Dermatology  Referral; Future                Subjective   Swati is a 84 year old, presenting for the following health issues:  Derm Problem (Possible rash on chest area for 2-3 days, itchy. )        7/25/2024     7:57 AM   Additional Questions   Roomed by Jeff SCHROEDER     Discuss possible rash on chest present for the past 4-5 days.   There is a red raised rash on the left side of her chest, shoulder and upper back.   It has been itching. There were never blisters with this rash. It is dry and itchy.   No new products being used.  She has otherwise felt well, no URI symptoms or change in energy.   She reports always having dry skin. She was established with Dermatology in the past, but had moved away for a few years to live with a different daughter and would like to get established again. She had a history of skin cancer on her face.               Review of Systems  Constitutional, HEENT, cardiovascular, pulmonary, GI, , musculoskeletal, neuro, skin, endocrine and psych systems are negative, except as otherwise noted.      Objective    /72   Pulse 96   Temp 98.5  F (36.9  C) (Tympanic)   Resp 20   Wt 66.7 kg (147 lb)   LMP  (LMP Unknown)   SpO2 95%   Breastfeeding No   BMI 27.78 kg/m    Body mass index is 27.78 kg/m .  Physical Exam   GENERAL: alert and no distress  MS: no gross musculoskeletal defects noted, no edema  SKIN: left anterior chest: raised red lesions, dry  surface, they are scattered over her chest and left shoulder.   PSYCH: mentation appears normal, affect normal/bright            Signed Electronically by: Kristen M. Kehr, PA-C

## 2024-09-04 ENCOUNTER — OFFICE VISIT (OUTPATIENT)
Dept: URGENT CARE | Facility: URGENT CARE | Age: 84
End: 2024-09-04
Payer: COMMERCIAL

## 2024-09-04 VITALS
TEMPERATURE: 96 F | BODY MASS INDEX: 28.34 KG/M2 | SYSTOLIC BLOOD PRESSURE: 132 MMHG | RESPIRATION RATE: 14 BRPM | OXYGEN SATURATION: 98 % | WEIGHT: 150 LBS | DIASTOLIC BLOOD PRESSURE: 67 MMHG | HEART RATE: 66 BPM

## 2024-09-04 DIAGNOSIS — L20.9 ATOPIC DERMATITIS, UNSPECIFIED TYPE: Primary | ICD-10-CM

## 2024-09-04 PROCEDURE — 99213 OFFICE O/P EST LOW 20 MIN: CPT | Performed by: STUDENT IN AN ORGANIZED HEALTH CARE EDUCATION/TRAINING PROGRAM

## 2024-09-04 RX ORDER — LORATADINE 10 MG/1
10 TABLET ORAL DAILY
Qty: 30 TABLET | Refills: 1 | Status: SHIPPED | OUTPATIENT
Start: 2024-09-04

## 2024-09-04 RX ORDER — TRIAMCINOLONE ACETONIDE 5 MG/G
CREAM TOPICAL 2 TIMES DAILY
Qty: 454 G | Refills: 1 | Status: SHIPPED | OUTPATIENT
Start: 2024-09-04

## 2024-09-04 ASSESSMENT — PAIN SCALES - GENERAL: PAINLEVEL: NO PAIN (0)

## 2024-09-04 NOTE — PROGRESS NOTES
Assessment & Plan     Atopic dermatitis, unspecified type  Advised treatment with the following for atopic dermatitis. Also advised changing to Ivory or Dial bar soap. Some of the lesions appear psoriatic though not in the typical areas on the body where we see psoriasis. Thankfully she has an appointment with derm at the end of the month to further evaluate.   - triamcinolone (ARISTOCORT HP) 0.5 % external cream  Dispense: 454 g; Refill: 1  - loratadine (CLARITIN) 10 MG tablet  Dispense: 30 tablet; Refill: 1         No follow-ups on file.    Sheree Sorenson, KARLEE CNP  [facility]    Paola Longoria is a 84 year old female who presents to clinic today for the following health issues:  Chief Complaint   Patient presents with    Urgent Care    Derm Problem     Itchiness all over body since last night          9/4/2024    10:13 AM   Additional Questions   Roomed by ca     HPI        Review of Systems  Constitutional, HEENT, cardiovascular, pulmonary, GI, , musculoskeletal, neuro, skin, endocrine and psych systems are negative, except as otherwise noted.      Objective    /67   Pulse 66   Temp (!) 96  F (35.6  C) (Tympanic)   Resp 14   Wt 68 kg (150 lb)   LMP  (LMP Unknown)   SpO2 98%   BMI 28.34 kg/m    Physical Exam   GENERAL: alert and no distress  MS: no gross musculoskeletal defects noted, no edema  SKIN: scabbed lesions, some with silvery appearing scale on bilateral forearms and bilateral anterior lower legs  NEURO: Normal strength and tone, mentation intact and speech normal  PSYCH: mentation appears normal, affect normal/bright    No results found for this or any previous visit (from the past 24 hour(s)).

## 2024-09-04 NOTE — PATIENT INSTRUCTIONS
Use Ivory or Dial bar soap for bathing.     Start loratadine once daily - this is an allergy medication    Use triamcinolone steroid cream twice daily to the itchy/rash areas.    Follow up at dermatology appointment at the end of the month.    Jacki Sorenson, MARION

## 2024-09-07 ENCOUNTER — OFFICE VISIT (OUTPATIENT)
Dept: URGENT CARE | Facility: URGENT CARE | Age: 84
End: 2024-09-07
Payer: COMMERCIAL

## 2024-09-07 VITALS
SYSTOLIC BLOOD PRESSURE: 157 MMHG | RESPIRATION RATE: 16 BRPM | HEART RATE: 108 BPM | TEMPERATURE: 98.5 F | BODY MASS INDEX: 27.96 KG/M2 | WEIGHT: 148 LBS | DIASTOLIC BLOOD PRESSURE: 76 MMHG | OXYGEN SATURATION: 96 %

## 2024-09-07 DIAGNOSIS — L40.4 GUTTATE PSORIASIS: Primary | ICD-10-CM

## 2024-09-07 LAB
DEPRECATED S PYO AG THROAT QL EIA: NEGATIVE
GROUP A STREP BY PCR: NOT DETECTED

## 2024-09-07 PROCEDURE — 99213 OFFICE O/P EST LOW 20 MIN: CPT | Performed by: FAMILY MEDICINE

## 2024-09-07 PROCEDURE — 87651 STREP A DNA AMP PROBE: CPT | Performed by: FAMILY MEDICINE

## 2024-09-07 RX ORDER — PREDNISONE 10 MG/1
TABLET ORAL
Qty: 30 TABLET | Refills: 0 | Status: SHIPPED | OUTPATIENT
Start: 2024-09-07 | End: 2024-09-09

## 2024-09-07 NOTE — PROGRESS NOTES
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  No treatment yet.  Strep test negative.    Plan: Streptococcus A Rapid Screen w/Reflex to PCR -         Clinic Collect, Group A Streptococcus PCR         Throat Swab, predniSONE (DELTASONE) 10 MG         tablet, Adult Dermatology  Referral          Patient also has made a Derm appointment later in the month and should keep this if we cannot get her in sooner.      CHIEF COMPLAINT    Rash.  Itching.      HISTORY    This patient has a pruritic bothersome rash most prominent on her back and some on her arms.    She says the rash can start out as a small white bump but then it turns into more of a scaly raised lesion.  This has been going on for a month or 2 but is particularly more bothersome in the last 2 days.    She has had no specific treatment for this up to this point.    She says she has multiple allergies including aspirin.    She has not had a sore throat or URI type illness recently.      REVIEW OF SYSTEMS    No fever.  No congestion or breathing problems.  No chest pains.  No nausea or abdominal pains.      EXAM  BP (!) 157/76 (BP Location: Right arm, Patient Position: Sitting, Cuff Size: Adult Regular)   Pulse 108   Temp 98.5  F (36.9  C) (Tympanic)   Resp 16   Wt 67.1 kg (148 lb)   LMP  (LMP Unknown)   SpO2 96%   BMI 27.96 kg/m      There are multiple lesions on her back and some on her forearms.  They vary in size from 2 or 3 mm up to 1.5 cm.  Lesions have a scaly whitish top and appear plaque-like and maybe a little pinkness at the base.

## 2024-09-07 NOTE — PATIENT INSTRUCTIONS
Take prescribed medication as directed.      Generic Claritin or Allegra twice daily.      Lotion like Aveeno or Lubriderm may help.    Follow up with Dermatology.

## 2024-09-09 DIAGNOSIS — L40.4 GUTTATE PSORIASIS: ICD-10-CM

## 2024-09-09 RX ORDER — PREDNISONE 10 MG/1
TABLET ORAL
Qty: 30 TABLET | Refills: 0 | Status: SHIPPED | OUTPATIENT
Start: 2024-09-09

## 2024-09-09 NOTE — TELEPHONE ENCOUNTER
Medication Question or Refill -     Patient states she only took 2 of the pills she was prescribed in  on 9/7 because she accidentally threw the pill bottle in the trash. Hoping to get a replacement refill.     What medication are you calling about (include dose and sig)?: Med & pharm pended     Preferred Pharmacy: [facility]     Controlled Substance Agreement on file:   CSA -- Patient Level:    CSA: None found at the patient level.       Who prescribed the medication?: Dr. Fisher in  - 9/7     Do you need a refill? Yes    When did you use the medication last? Yesterday    Patient offered an appointment? No    Could we send this information to you in [facility] or would you prefer to receive a phone call?:   Patient would prefer a phone call   Okay to leave a detailed message?: Yes at Home number on file [phone] (home)     Please call patient if refill will be sent on home phone.     Gisela JONES,    [facility]

## 2024-12-15 DIAGNOSIS — E78.2 MIXED HYPERLIPIDEMIA: Chronic | ICD-10-CM

## 2024-12-15 DIAGNOSIS — Z86.73 HISTORY OF TIA (TRANSIENT ISCHEMIC ATTACK): ICD-10-CM

## 2024-12-15 DIAGNOSIS — I10 HYPERTENSION GOAL BP (BLOOD PRESSURE) < 150/90: Chronic | ICD-10-CM

## 2024-12-16 RX ORDER — POTASSIUM CHLORIDE 1500 MG/1
TABLET, EXTENDED RELEASE ORAL
Qty: 100 TABLET | Refills: 1 | Status: SHIPPED | OUTPATIENT
Start: 2024-12-16

## 2024-12-16 RX ORDER — CLOPIDOGREL BISULFATE 75 MG/1
TABLET ORAL
Qty: 100 TABLET | Refills: 2 | Status: SHIPPED | OUTPATIENT
Start: 2024-12-16

## 2024-12-16 RX ORDER — ROSUVASTATIN CALCIUM 5 MG/1
TABLET, COATED ORAL
Qty: 100 TABLET | Refills: 2 | Status: SHIPPED | OUTPATIENT
Start: 2024-12-16

## 2025-02-19 DIAGNOSIS — I10 HYPERTENSION GOAL BP (BLOOD PRESSURE) < 150/90: Chronic | ICD-10-CM

## 2025-02-20 RX ORDER — VERAPAMIL HYDROCHLORIDE 180 MG/1
180 TABLET, FILM COATED, EXTENDED RELEASE ORAL 2 TIMES DAILY
Qty: 180 TABLET | Refills: 0 | Status: SHIPPED | OUTPATIENT
Start: 2025-02-20

## 2025-03-25 ENCOUNTER — OFFICE VISIT (OUTPATIENT)
Dept: URGENT CARE | Facility: URGENT CARE | Age: 85
End: 2025-03-25
Payer: COMMERCIAL

## 2025-03-25 VITALS
DIASTOLIC BLOOD PRESSURE: 80 MMHG | OXYGEN SATURATION: 92 % | BODY MASS INDEX: 28.3 KG/M2 | SYSTOLIC BLOOD PRESSURE: 136 MMHG | WEIGHT: 149.8 LBS | TEMPERATURE: 98.1 F | HEART RATE: 115 BPM | RESPIRATION RATE: 15 BRPM

## 2025-03-25 DIAGNOSIS — R06.02 SHORTNESS OF BREATH: Primary | ICD-10-CM

## 2025-03-25 DIAGNOSIS — R53.1 WEAKNESS: ICD-10-CM

## 2025-03-25 DIAGNOSIS — R55 NEAR SYNCOPE: ICD-10-CM

## 2025-03-25 DIAGNOSIS — R05.1 ACUTE COUGH: ICD-10-CM

## 2025-03-25 DIAGNOSIS — R00.0 TACHYCARDIA: ICD-10-CM

## 2025-03-25 PROCEDURE — 99214 OFFICE O/P EST MOD 30 MIN: CPT | Performed by: NURSE PRACTITIONER

## 2025-03-25 PROCEDURE — 3079F DIAST BP 80-89 MM HG: CPT | Performed by: NURSE PRACTITIONER

## 2025-03-25 PROCEDURE — 3075F SYST BP GE 130 - 139MM HG: CPT | Performed by: NURSE PRACTITIONER

## 2025-03-25 NOTE — PROGRESS NOTES
Assessment & Plan     Shortness of breath      Near syncope      Tachycardia      Acute cough      Weakness       Go to emergency room for further evaluation of severe shortness of breath with cough, tachycardia, near syncope, weakness, laryngitis as higher level of care indicated, possible inpatient stay  Has not been drinking fluids    Patient agreeable and is discharged in stable condition in wheelchair.         Anju Hoyt NP  [facility]    Paola Longoria is a 84 year old female who presents to clinic today with her daughter for the following health issues:  Chief Complaint   Patient presents with    URI     Cough, sore throat, laryngitis for a couple days          3/25/2025    12:17 PM   Additional Questions   Roomed by Pretty   Accompanied by Daughter- Deborah     History obtained from daughter     Patient presents for evaluation of cough. Associated symptoms: sore throat, loss of voice, dizziness, weakness, feeling like she is going to pass out, shortness of breath.   Denies chest pain. No known fever. Has been getting chills and warmth. Shortness of breath is severe. Symptoms have been present for a couple days and have been worsening. Daughter was out of town and just visited this morning at her assisted living and brought her here. Has not been drinking or voiding. Nothing tried for symptoms.     She has a history of HTN, TIA.     Problem list, Medication list, Allergies, and Medical/Social/Surgical histories reviewed in [facility] and updated as appropriate.    ROS:  Review of systems negative except for noted above        Objective    /80   Pulse 115   Temp 98.1  F (36.7  C) (Oral)   Resp 15   Wt 67.9 kg (149 lb 12.8 oz)   LMP  (LMP Unknown)   SpO2 92%   BMI 28.30 kg/m    Physical Exam  Constitutional:       General: She is not in acute distress.     Appearance: She is not toxic-appearing or diaphoretic.   HENT:      Head: Normocephalic and atraumatic.       Right Ear: Decreased hearing noted.      Left Ear: Decreased hearing noted.      Mouth/Throat:      Mouth: Mucous membranes are dry.      Pharynx: Oropharynx is clear. No oropharyngeal exudate or posterior oropharyngeal erythema.   Cardiovascular:      Rate and Rhythm: Regular rhythm. Tachycardia present.      Heart sounds: Normal heart sounds.   Pulmonary:      Effort: No respiratory distress.      Breath sounds: No wheezing.      Comments: Coarse lung sounds, increased work of breathing, episodic harsh cough  Skin:     General: Skin is warm and dry.      Coloration: Skin is pale.   Neurological:      Mental Status: She is alert.      Motor: Weakness present.

## 2025-03-25 NOTE — PATIENT INSTRUCTIONS
Go to emergency room for further evaluation of severe shortness of breath with cough, tachycardia, near syncope, weakness, laryngitis  Has not been drinking

## 2025-04-20 DIAGNOSIS — I10 HYPERTENSION GOAL BP (BLOOD PRESSURE) < 150/90: Chronic | ICD-10-CM

## 2025-04-21 RX ORDER — VERAPAMIL HYDROCHLORIDE 180 MG/1
180 TABLET, FILM COATED, EXTENDED RELEASE ORAL 2 TIMES DAILY
Qty: 180 TABLET | Refills: 3 | Status: SHIPPED | OUTPATIENT
Start: 2025-04-21

## 2025-04-30 ENCOUNTER — DOCUMENTATION ONLY (OUTPATIENT)
Dept: LAB | Facility: CLINIC | Age: 85
End: 2025-04-30
Payer: COMMERCIAL

## 2025-04-30 NOTE — PROGRESS NOTES
Left voicemail for daughter of June to call the lab regarding no orders available for 05.02.2025 lab appointment.  Daughter's name is Deborah.  No answer or voicemail on June's phone.  Need to know if she is bringing orders for TB testing.  Ivone Aguero MLT at [facility]   April 30, 2025

## 2025-05-09 NOTE — TELEPHONE ENCOUNTER
Information below is reviewed with  Dr Deborah Terrell, Verbal Orders will try the patient on the Verapamil  mg 2 times daily.  Would like patient to try a short term supply with local pharmacy and follow Your provider would like you to have a blood pressure check with either the pharmacy or the Ancillary Nurse.  The pharmacy, does not require an appointment, you can walk in at your convenience and the results are sent to your primary care provider for review, however you have to join the Pharmacy blood pressure program.  The other option is to see the  Ancillary nurse, this requires an appointment, Please call [phone] to schedule an appointment.  Either blood pressure check is of no cost to you.    Patient/parent verbalized understanding of instructions provided and agreed with the plan of care    Per protocol, will route encounter to be cosigned by provider for Verbal Orders.  Jada Humphrey RN      Female

## 2025-05-12 ENCOUNTER — TELEPHONE (OUTPATIENT)
Dept: INTERNAL MEDICINE | Facility: CLINIC | Age: 85
End: 2025-05-12
Payer: COMMERCIAL

## 2025-05-12 NOTE — TELEPHONE ENCOUNTER
Called and spoke with Deborah and told her we cannot give those shot here in clinic. Told her to check with pharmacy or ask with staff at the [facility] where Swati lives.    She understood and had no further questions.    Chula Centeno, CMA

## 2025-05-12 NOTE — TELEPHONE ENCOUNTER
FYI - Status Update    Who is Calling: family member, daughter    Update: Patient's daughter is calling. The dermatologist at [facility], Dr Yap, ordered an injection for her psoriasis. The injection is syrizik. [facility] does not give injections. Daughter wants to know if the medication can be shipped her, and someone give it to her?    Does caller want a call/response back: Yes     Could we send this information to you in [employer] or would you prefer to receive a phone call?:   Patient would prefer a phone call   Okay to leave a detailed message?: Yes at Other phone number:  daughterDeborah, [phone]    Roxanna Chirinos [facility]

## 2025-05-19 ENCOUNTER — OFFICE VISIT (OUTPATIENT)
Dept: FAMILY MEDICINE | Facility: CLINIC | Age: 85
End: 2025-05-19
Payer: COMMERCIAL

## 2025-05-19 VITALS
SYSTOLIC BLOOD PRESSURE: 124 MMHG | HEART RATE: 93 BPM | WEIGHT: 157.2 LBS | TEMPERATURE: 97.8 F | OXYGEN SATURATION: 95 % | HEIGHT: 60 IN | BODY MASS INDEX: 30.86 KG/M2 | RESPIRATION RATE: 16 BRPM | DIASTOLIC BLOOD PRESSURE: 76 MMHG

## 2025-05-19 DIAGNOSIS — R41.3 MEMORY CHANGES: ICD-10-CM

## 2025-05-19 DIAGNOSIS — F41.9 ANXIETY: Primary | ICD-10-CM

## 2025-05-19 PROCEDURE — 99213 OFFICE O/P EST LOW 20 MIN: CPT | Performed by: PHYSICIAN ASSISTANT

## 2025-05-19 PROCEDURE — 3074F SYST BP LT 130 MM HG: CPT | Performed by: PHYSICIAN ASSISTANT

## 2025-05-19 PROCEDURE — 3078F DIAST BP <80 MM HG: CPT | Performed by: PHYSICIAN ASSISTANT

## 2025-05-19 RX ORDER — CITALOPRAM HYDROBROMIDE 20 MG/1
20 TABLET ORAL DAILY
Qty: 30 TABLET | Refills: 0 | Status: SHIPPED | OUTPATIENT
Start: 2025-05-19

## 2025-05-19 ASSESSMENT — ANXIETY QUESTIONNAIRES
7. FEELING AFRAID AS IF SOMETHING AWFUL MIGHT HAPPEN: NEARLY EVERY DAY
IF YOU CHECKED OFF ANY PROBLEMS ON THIS QUESTIONNAIRE, HOW DIFFICULT HAVE THESE PROBLEMS MADE IT FOR YOU TO DO YOUR WORK, TAKE CARE OF THINGS AT HOME, OR GET ALONG WITH OTHER PEOPLE: SOMEWHAT DIFFICULT
2. NOT BEING ABLE TO STOP OR CONTROL WORRYING: MORE THAN HALF THE DAYS
5. BEING SO RESTLESS THAT IT IS HARD TO SIT STILL: MORE THAN HALF THE DAYS
GAD7 TOTAL SCORE: 16
7. FEELING AFRAID AS IF SOMETHING AWFUL MIGHT HAPPEN: NEARLY EVERY DAY
8. IF YOU CHECKED OFF ANY PROBLEMS, HOW DIFFICULT HAVE THESE MADE IT FOR YOU TO DO YOUR WORK, TAKE CARE OF THINGS AT HOME, OR GET ALONG WITH OTHER PEOPLE?: SOMEWHAT DIFFICULT
GAD7 TOTAL SCORE: 16
GAD7 TOTAL SCORE: 16
1. FEELING NERVOUS, ANXIOUS, OR ON EDGE: NEARLY EVERY DAY
6. BECOMING EASILY ANNOYED OR IRRITABLE: SEVERAL DAYS
4. TROUBLE RELAXING: NEARLY EVERY DAY
3. WORRYING TOO MUCH ABOUT DIFFERENT THINGS: MORE THAN HALF THE DAYS

## 2025-05-19 ASSESSMENT — PATIENT HEALTH QUESTIONNAIRE - PHQ9
SUM OF ALL RESPONSES TO PHQ QUESTIONS 1-9: 3
10. IF YOU CHECKED OFF ANY PROBLEMS, HOW DIFFICULT HAVE THESE PROBLEMS MADE IT FOR YOU TO DO YOUR WORK, TAKE CARE OF THINGS AT HOME, OR GET ALONG WITH OTHER PEOPLE: NOT DIFFICULT AT ALL
SUM OF ALL RESPONSES TO PHQ QUESTIONS 1-9: 3

## 2025-05-19 ASSESSMENT — ENCOUNTER SYMPTOMS: NERVOUS/ANXIOUS: 1

## 2025-05-19 NOTE — PROGRESS NOTES
"  Assessment & Plan       ICD-10-CM    1. Anxiety  F41.9 citalopram (CELEXA) 20 MG tablet      2. Memory changes  R41.3 Adult Neurology  Referral          1) Will have her start Celexa and titrate to 20mg daily. Anxiety is likely triggered by some memory changes.     2) Referral to neuro for a memory eval      BMI  Estimated body mass index is 30.46 kg/m  as calculated from the following:    Height as of this encounter: 1.53 m (5' 0.24\").    Weight as of this encounter: 71.3 kg (157 lb 3.2 oz).     Return in 16 days (on 6/4/2025) for anxiety follow up with your PCP.      Paola Longoria is a 84 year old, presenting for the following health issues:  Anxiety (/), Memory Loss, and aggitation      5/19/2025     4:42 PM   Additional Questions   Roomed by MP   Accompanied by daughter         5/19/2025     4:42 PM   Patient Reported Additional Medications   Patient reports taking the following new medications injection for scoriosis     Anxiety    History of Present Illness       Mental Health Follow-up:  Patient presents to follow-up on Depression & Anxiety.Patient's depression since last visit has been:  Better  The patient is not having other symptoms associated with depression.  Patient's anxiety since last visit has been:  Worse  The patient is having other symptoms associated with anxiety.  Any significant life events: No  Patient is feeling anxious or having panic attacks.  Patient has no concerns about alcohol or drug use.         Worried in the middle of the night  Calling her daughter, Deborah, son in law, and one of her grandkids at 3am  Worried she's going to die, stating she's going to walk to the ER, reporting some nausea  Skirizi is a new medication - was worried about s/e but they had confirmed with the pharmacist that there should be no contraindication to her food allergies   ER is a couple miles away  Chris started last week, phone calls were occurring before that  Calls seems to be becoming " "more frequent though  Daughter has noticed some memory changes, forgetful         Review of Systems  Constitutional, and psychiatric systems are negative, except as otherwise noted.      Objective    BP (!) 146/88   Pulse 93   Temp 97.8  F (36.6  C) (Temporal)   Resp 16   Ht 1.53 m (5' 0.24\")   Wt 71.3 kg (157 lb 3.2 oz)   LMP  (LMP Unknown)   SpO2 95%   BMI 30.46 kg/m    Body mass index is 30.46 kg/m .  Physical Exam   GENERAL: alert and no distress  MS: no gross musculoskeletal defects noted, no edema  SKIN: no suspicious lesions or rashes  NEURO: Normal strength and tone, mentation intact and speech normal  PSYCH: mentation appears normal, affect normal/bright          Signed Electronically by: Cassia Rossi PA-C    "

## 2025-05-20 ENCOUNTER — PATIENT OUTREACH (OUTPATIENT)
Dept: CARE COORDINATION | Facility: CLINIC | Age: 85
End: 2025-05-20
Payer: COMMERCIAL

## 2025-05-30 DIAGNOSIS — I10 HYPERTENSION GOAL BP (BLOOD PRESSURE) < 150/90: Chronic | ICD-10-CM

## 2025-06-02 RX ORDER — POTASSIUM CHLORIDE 1500 MG/1
20 TABLET, EXTENDED RELEASE ORAL
Qty: 100 TABLET | Refills: 0 | Status: SHIPPED | OUTPATIENT
Start: 2025-06-02

## 2025-06-04 ENCOUNTER — OFFICE VISIT (OUTPATIENT)
Dept: FAMILY MEDICINE | Facility: CLINIC | Age: 85
End: 2025-06-04
Payer: COMMERCIAL

## 2025-06-04 VITALS
DIASTOLIC BLOOD PRESSURE: 76 MMHG | OXYGEN SATURATION: 98 % | BODY MASS INDEX: 29.64 KG/M2 | WEIGHT: 157 LBS | SYSTOLIC BLOOD PRESSURE: 128 MMHG | HEIGHT: 61 IN | HEART RATE: 73 BPM

## 2025-06-04 DIAGNOSIS — F41.9 ANXIETY: Primary | ICD-10-CM

## 2025-06-04 DIAGNOSIS — R41.3 MEMORY CHANGE: ICD-10-CM

## 2025-06-04 PROCEDURE — 3078F DIAST BP <80 MM HG: CPT | Performed by: NURSE PRACTITIONER

## 2025-06-04 PROCEDURE — 3074F SYST BP LT 130 MM HG: CPT | Performed by: NURSE PRACTITIONER

## 2025-06-04 PROCEDURE — G2211 COMPLEX E/M VISIT ADD ON: HCPCS | Performed by: NURSE PRACTITIONER

## 2025-06-04 PROCEDURE — 99214 OFFICE O/P EST MOD 30 MIN: CPT | Performed by: NURSE PRACTITIONER

## 2025-06-04 RX ORDER — CITALOPRAM HYDROBROMIDE 20 MG/1
20 TABLET ORAL DAILY
Qty: 30 TABLET | Refills: 0 | Status: SHIPPED | OUTPATIENT
Start: 2025-06-04

## 2025-06-04 ASSESSMENT — ANXIETY QUESTIONNAIRES
6. BECOMING EASILY ANNOYED OR IRRITABLE: NOT AT ALL
4. TROUBLE RELAXING: SEVERAL DAYS
8. IF YOU CHECKED OFF ANY PROBLEMS, HOW DIFFICULT HAVE THESE MADE IT FOR YOU TO DO YOUR WORK, TAKE CARE OF THINGS AT HOME, OR GET ALONG WITH OTHER PEOPLE?: NOT DIFFICULT AT ALL
GAD7 TOTAL SCORE: 2
GAD7 TOTAL SCORE: 2
IF YOU CHECKED OFF ANY PROBLEMS ON THIS QUESTIONNAIRE, HOW DIFFICULT HAVE THESE PROBLEMS MADE IT FOR YOU TO DO YOUR WORK, TAKE CARE OF THINGS AT HOME, OR GET ALONG WITH OTHER PEOPLE: NOT DIFFICULT AT ALL
3. WORRYING TOO MUCH ABOUT DIFFERENT THINGS: NOT AT ALL
1. FEELING NERVOUS, ANXIOUS, OR ON EDGE: NOT AT ALL
7. FEELING AFRAID AS IF SOMETHING AWFUL MIGHT HAPPEN: NOT AT ALL
5. BEING SO RESTLESS THAT IT IS HARD TO SIT STILL: NOT AT ALL
GAD7 TOTAL SCORE: 2
7. FEELING AFRAID AS IF SOMETHING AWFUL MIGHT HAPPEN: NOT AT ALL
2. NOT BEING ABLE TO STOP OR CONTROL WORRYING: SEVERAL DAYS

## 2025-06-04 ASSESSMENT — ENCOUNTER SYMPTOMS: NERVOUS/ANXIOUS: 1

## 2025-06-04 NOTE — PROGRESS NOTES
"  Assessment & Plan     Anxiety  Doing much better on Celexa, no side effects.  Med refilled today.   - citalopram (CELEXA) 20 MG tablet; Take 1 tablet (20 mg) by mouth daily. - take a half tab for the first 1 week    Memory change  Slowly worsening over time, family noticing.  Deny acute change. Recommend cognitive eval with OT while waiting to get in with neurology, appt scheduled for November 2025.    - Occupational Therapy  Referral; Future      BMI  Estimated body mass index is 29.66 kg/m  as calculated from the following:    Height as of this encounter: 1.549 m (5' 1\").    Weight as of this encounter: 71.2 kg (157 lb).       Paola Longoria is a 85 year old, presenting for the following health issues:  Anxiety and Depression      6/4/2025     9:45 AM   Additional Questions   Roomed by ismael   Accompanied by isreal de jesus     Anxiety    History of Present Illness       Mental Health Follow-up:  Patient presents to follow-up on Depression & Anxiety.Patient's depression since last visit has been:  Better  The patient is not having other symptoms associated with depression.  Patient's anxiety since last visit has been:  Better  The patient is not having other symptoms associated with anxiety.  Any significant life events: grief or loss  Patient is feeling anxious or having panic attacks.  Patient has no concerns about alcohol or drug use.    She eats 2-3 servings of fruits and vegetables daily.She consumes 4 sweetened beverage(s) daily.She exercises with enough effort to increase her heart rate 9 or less minutes per day.  She exercises with enough effort to increase her heart rate 3 or less days per week. She is missing 2 dose(s) of medications per week.  She is not taking prescribed medications regularly due to remembering to take.          Had been having more anxiety, worrying in the middle of the night.  Frequent calls to family.  Lives in senior building.    History of memory impairment, saw " "neurology in 2022.  Imaging reassuring. Thought to actually be doing pretty well at that time and was told to only follow-up if memory worsened.     Recently was seen in clinic for the anxiety and memory.  Started on Celexa 20 mg.  This actually seems to be helping her quite a bit.  She was referred to neurology but appt isn't until November.       Review of Systems  Constitutional, HEENT, cardiovascular, pulmonary, gi and gu systems are negative, except as otherwise noted.      Objective    /76   Pulse 73   Ht 1.549 m (5' 1\")   Wt 71.2 kg (157 lb)   LMP  (LMP Unknown)   SpO2 98%   BMI 29.66 kg/m    Body mass index is 29.66 kg/m .  Physical Exam   GENERAL: alert and no distress  EYES: Eyes grossly normal to inspection, PERRL and conjunctivae and sclerae normal  HENT: ear canals and TM's normal, nose and mouth without ulcers or lesions  RESP: breathing unlabored  MS: no gross musculoskeletal defects noted, no edema  SKIN: no suspicious lesions or rashes  NEURO: Normal strength and tone, mentation intact and speech normal  PSYCH: mentation appears normal, affect normal/bright    The longitudinal plan of care for the diagnosis(es)/condition(s) as documented were addressed during this visit. Due to the added complexity in care, I will continue to support June in the subsequent management and with ongoing continuity of care.           Signed Electronically by: Claudia Dasilva CNP    "

## 2025-06-10 ENCOUNTER — NURSE TRIAGE (OUTPATIENT)
Dept: FAMILY MEDICINE | Facility: CLINIC | Age: 85
End: 2025-06-10

## 2025-06-10 ENCOUNTER — ANCILLARY PROCEDURE (OUTPATIENT)
Dept: ULTRASOUND IMAGING | Facility: CLINIC | Age: 85
End: 2025-06-10
Attending: NURSE PRACTITIONER
Payer: COMMERCIAL

## 2025-06-10 ENCOUNTER — OFFICE VISIT (OUTPATIENT)
Dept: PEDIATRICS | Facility: CLINIC | Age: 85
End: 2025-06-10
Payer: COMMERCIAL

## 2025-06-10 ENCOUNTER — TELEPHONE (OUTPATIENT)
Dept: INTERNAL MEDICINE | Facility: CLINIC | Age: 85
End: 2025-06-10

## 2025-06-10 VITALS
OXYGEN SATURATION: 96 % | DIASTOLIC BLOOD PRESSURE: 64 MMHG | RESPIRATION RATE: 18 BRPM | TEMPERATURE: 98.2 F | HEART RATE: 60 BPM | SYSTOLIC BLOOD PRESSURE: 135 MMHG

## 2025-06-10 DIAGNOSIS — Z13.0 SCREENING, ANEMIA, DEFICIENCY, IRON: ICD-10-CM

## 2025-06-10 DIAGNOSIS — M79.661 BILATERAL CALF PAIN: Primary | ICD-10-CM

## 2025-06-10 DIAGNOSIS — N18.32 STAGE 3B CHRONIC KIDNEY DISEASE (H): ICD-10-CM

## 2025-06-10 DIAGNOSIS — M79.662 BILATERAL CALF PAIN: Primary | ICD-10-CM

## 2025-06-10 DIAGNOSIS — M79.662 BILATERAL CALF PAIN: ICD-10-CM

## 2025-06-10 DIAGNOSIS — M79.661 BILATERAL CALF PAIN: ICD-10-CM

## 2025-06-10 LAB
ALBUMIN SERPL BCG-MCNC: 4.1 G/DL (ref 3.5–5.2)
ALP SERPL-CCNC: 78 U/L (ref 40–150)
ALT SERPL W P-5'-P-CCNC: 15 U/L (ref 0–50)
ANION GAP SERPL CALCULATED.3IONS-SCNC: 11 MMOL/L (ref 7–15)
AST SERPL W P-5'-P-CCNC: 20 U/L (ref 0–45)
BASOPHILS # BLD AUTO: 0 10E3/UL (ref 0–0.2)
BASOPHILS NFR BLD AUTO: 1 %
BILIRUB SERPL-MCNC: 0.3 MG/DL
BUN SERPL-MCNC: 22.7 MG/DL (ref 8–23)
CALCIUM SERPL-MCNC: 9.6 MG/DL (ref 8.8–10.4)
CHLORIDE SERPL-SCNC: 105 MMOL/L (ref 98–107)
CREAT SERPL-MCNC: 1.07 MG/DL (ref 0.51–0.95)
EGFRCR SERPLBLD CKD-EPI 2021: 50 ML/MIN/1.73M2
EOSINOPHIL # BLD AUTO: 1.1 10E3/UL (ref 0–0.7)
EOSINOPHIL NFR BLD AUTO: 14 %
ERYTHROCYTE [DISTWIDTH] IN BLOOD BY AUTOMATED COUNT: 13.3 % (ref 10–15)
FERRITIN SERPL-MCNC: 96 NG/ML (ref 11–328)
GLUCOSE SERPL-MCNC: 117 MG/DL (ref 70–99)
HCO3 SERPL-SCNC: 26 MMOL/L (ref 22–29)
HCT VFR BLD AUTO: 43.2 % (ref 35–47)
HGB BLD-MCNC: 14.4 G/DL (ref 11.7–15.7)
IMM GRANULOCYTES # BLD: 0 10E3/UL
IMM GRANULOCYTES NFR BLD: 1 %
IRON BINDING CAPACITY (ROCHE): 303 UG/DL (ref 240–430)
IRON SATN MFR SERPL: 21 % (ref 15–46)
IRON SERPL-MCNC: 65 UG/DL (ref 37–145)
LYMPHOCYTES # BLD AUTO: 1.6 10E3/UL (ref 0.8–5.3)
LYMPHOCYTES NFR BLD AUTO: 19 %
MAGNESIUM SERPL-MCNC: 2.1 MG/DL (ref 1.7–2.3)
MCH RBC QN AUTO: 30.2 PG (ref 26.5–33)
MCHC RBC AUTO-ENTMCNC: 33.3 G/DL (ref 31.5–36.5)
MCV RBC AUTO: 91 FL (ref 78–100)
MONOCYTES # BLD AUTO: 0.6 10E3/UL (ref 0–1.3)
MONOCYTES NFR BLD AUTO: 8 %
NEUTROPHILS # BLD AUTO: 4.7 10E3/UL (ref 1.6–8.3)
NEUTROPHILS NFR BLD AUTO: 59 %
NRBC # BLD AUTO: 0 10E3/UL
NRBC BLD AUTO-RTO: 0 /100
PLATELET # BLD AUTO: 246 10E3/UL (ref 150–450)
POTASSIUM SERPL-SCNC: 4.3 MMOL/L (ref 3.4–5.3)
PROT SERPL-MCNC: 7 G/DL (ref 6.4–8.3)
RBC # BLD AUTO: 4.77 10E6/UL (ref 3.8–5.2)
SODIUM SERPL-SCNC: 142 MMOL/L (ref 135–145)
VIT B12 SERPL-MCNC: 467 PG/ML (ref 232–1245)
WBC # BLD AUTO: 8.1 10E3/UL (ref 4–11)

## 2025-06-10 PROCEDURE — 99417 PROLNG OP E/M EACH 15 MIN: CPT | Performed by: NURSE PRACTITIONER

## 2025-06-10 PROCEDURE — 83540 ASSAY OF IRON: CPT | Mod: GZ | Performed by: NURSE PRACTITIONER

## 2025-06-10 PROCEDURE — 93970 EXTREMITY STUDY: CPT | Performed by: RADIOLOGY

## 2025-06-10 PROCEDURE — 82607 VITAMIN B-12: CPT | Performed by: NURSE PRACTITIONER

## 2025-06-10 PROCEDURE — 3078F DIAST BP <80 MM HG: CPT | Performed by: NURSE PRACTITIONER

## 2025-06-10 PROCEDURE — 3075F SYST BP GE 130 - 139MM HG: CPT | Performed by: NURSE PRACTITIONER

## 2025-06-10 PROCEDURE — 85025 COMPLETE CBC W/AUTO DIFF WBC: CPT | Performed by: NURSE PRACTITIONER

## 2025-06-10 PROCEDURE — 1125F AMNT PAIN NOTED PAIN PRSNT: CPT | Performed by: NURSE PRACTITIONER

## 2025-06-10 PROCEDURE — 83735 ASSAY OF MAGNESIUM: CPT | Performed by: NURSE PRACTITIONER

## 2025-06-10 PROCEDURE — 80053 COMPREHEN METABOLIC PANEL: CPT | Performed by: NURSE PRACTITIONER

## 2025-06-10 PROCEDURE — 99215 OFFICE O/P EST HI 40 MIN: CPT | Performed by: NURSE PRACTITIONER

## 2025-06-10 PROCEDURE — 82728 ASSAY OF FERRITIN: CPT | Mod: GZ | Performed by: NURSE PRACTITIONER

## 2025-06-10 PROCEDURE — 83550 IRON BINDING TEST: CPT | Mod: GZ | Performed by: NURSE PRACTITIONER

## 2025-06-10 PROCEDURE — 36415 COLL VENOUS BLD VENIPUNCTURE: CPT | Performed by: NURSE PRACTITIONER

## 2025-06-10 ASSESSMENT — PAIN SCALES - GENERAL: PAINLEVEL_OUTOF10: MILD PAIN (3)

## 2025-06-10 NOTE — TELEPHONE ENCOUNTER
Writer contacted patient.    Patient is confused.  Patient gave consent for writer to contact the daughter who is handling all appointments.     Writer called patient's daughter and gave the address of [facility].    Deborah (pt's daughter) is in agreement to take pt to [facility] today.    Writer spoke with [facility] nurse and requested her to contact pt's daughter to arrange arrival time.    Thais Phillips RN, BSN  [facility]

## 2025-06-10 NOTE — TELEPHONE ENCOUNTER
Reason for Call:  Appointment Request    Patient requesting this type of appt:  [facility] Follow Up     Requested provider: Claudia Dasilva (or any available provider)    Reason patient unable to be scheduled: Not within requested timeframe     When does patient want to be seen/preferred time: 2 days     Comments: Please contact isreal Cabrera when scheduling due to memory issues     Could we send this information to you in [employer] or would you prefer to receive a phone call?:   Patient would prefer a phone call   Okay to leave a detailed message?: Yes at Other phone number:  MICHELE BISHOP (Daughter)  [phone]      Call taken on 6/10/2025 at 4:52 PM by Cassia Diallo

## 2025-06-10 NOTE — TELEPHONE ENCOUNTER
[facility] staff notified of provider message below, and will review to determine if pt can be accepted at [facility] today.    HEATHER DurandN, RN

## 2025-06-10 NOTE — PROGRESS NOTES
"Acute and Diagnostic Services Clinic Visit    Assessment & Plan   Problem List Items Addressed This Visit       Stage 3b chronic kidney disease (H)     Other Visit Diagnoses         Bilateral calf pain    -  Primary    Relevant Orders    CBC with platelets differential (Completed)    Comprehensive metabolic panel (Completed)    Magnesium (Completed)    Vitamin B12 (Completed)    US Lower Extremity Venous Duplex Bilateral (Completed)      Screening, anemia, deficiency, iron        Relevant Orders    Ferritin (Completed)    Iron and iron binding capacity (Completed)         85-year-old patient presents to ED yesterday accompanied by her daughter history of neurocardiogenic syndrome, bilateral shoulder pain, MANNY, GERD, sicca syndrome, hyperlipidemia, hypertension, nonrheumatic aortic valve insufficiency, malignant neoplasm of the skin, psoriasis, osteoarthritis, stage III chronic kidney disease, depression, anxiety, history of TIA.  Patient reports new onset calf pain which awakens her at night bilateral lower extremity edema left greater than right.  Patient denies any shortness of breath or difficulty breathing.  She and her daughter reports she is taking all medications as prescribed.  Magnesium iron are within normal limits potassium 4.3, GFR stable at 58 glucose mildly elevated 117 CBC and hemoglobin are within normal limits subsequent ultrasound on bilateral lower legs is obtained.    {Time spent (Optional):111111}     BMI  Estimated body mass index is 29.66 kg/m  as calculated from the following:    Height as of 6/4/25: 1.549 m (5' 1\").    Weight as of 6/4/25: 71.2 kg (157 lb).           Subjective   Swati is a 85 year old, presenting for the following health issues:  Deep Vein Thrombosis (Right Calf - DVT R/O )    HPI      Evaluation for possible DVT  Onset/Duration: a few days  Description:       Location: Right Calf       Redness: YES- this is due skin condition       Pain: 3-4/10       Warmth: YES       Joint " swelling YES  Progression of symptoms same  Accompanying signs and symptoms:       Fevers: no        Numbness/tingling/weakness: no        Chest pain/pleurisy: no        Shortness of breath: no   History        Trauma: no         Recent travel/when: no         Previous history of DVT: YES- years ago         Family history of DVT: no         Recent surgery: no   Aggravating factors include: Seems the same regardless of position or weight bearing   Therapies tried and outcome: {: None  Prior surgery on arteries of veins in this area: No  {Link to age adjusted D-dimer (UTD)   :002758}        Review of Systems  Constitutional, HEENT, cardiovascular, pulmonary, GI, , musculoskeletal, neuro, skin, endocrine and psych systems are negative, except as otherwise noted.      Objective    /64 (BP Location: Right arm, Patient Position: Sitting, Cuff Size: Adult Large)   Pulse 60   Temp 98.2  F (36.8  C) (Oral)   Resp 18   LMP  (LMP Unknown)   SpO2 96%   There is no height or weight on file to calculate BMI.  Physical Exam   GENERAL: alert and no distress  EYES: Eyes grossly normal to inspection, P conjunctivae and sclerae normal  RESP: Respirations regular nonlabored  Lower extremities: Patient noted to have psoriasis without signs of secondary infection on her lower legs she has 1+ pitting edema in her lower legs left greater than right pulses intact  NEURO: Normal strength and tone, speech normal  PSYCH: mentation appears normal, affect normal/bright    Results for orders placed or performed in visit on 06/10/25 (from the past 24 hours)   Vitamin B12   Result Value Ref Range    Vitamin B12 467 232 - 1,245 pg/mL   Magnesium   Result Value Ref Range    Magnesium 2.1 1.7 - 2.3 mg/dL   Iron and iron binding capacity   Result Value Ref Range    Iron 65 37 - 145 ug/dL    Iron Binding Capacity 303 240 - 430 ug/dL    Iron Sat Index 21 15 - 46 %   Ferritin   Result Value Ref Range    Ferritin 96 11 - 328 ng/mL    Comprehensive metabolic panel   Result Value Ref Range    Sodium 142 135 - 145 mmol/L    Potassium 4.3 3.4 - 5.3 mmol/L    Carbon Dioxide (CO2) 26 22 - 29 mmol/L    Anion Gap 11 7 - 15 mmol/L    Urea Nitrogen 22.7 8.0 - 23.0 mg/dL    Creatinine 1.07 (H) 0.51 - 0.95 mg/dL    GFR Estimate 50 (L) >60 mL/min/1.73m2    Calcium 9.6 8.8 - 10.4 mg/dL    Chloride 105 98 - 107 mmol/L    Glucose 117 (H) 70 - 99 mg/dL    Alkaline Phosphatase 78 40 - 150 U/L    AST 20 0 - 45 U/L    ALT 15 0 - 50 U/L    Protein Total 7.0 6.4 - 8.3 g/dL    Albumin 4.1 3.5 - 5.2 g/dL    Bilirubin Total 0.3 <=1.2 mg/dL   CBC with platelets differential    Narrative    The following orders were created for panel order CBC with platelets differential.  Procedure                               Abnormality         Status                     ---------                               -----------         ------                     CBC with platelets and ...[[id number]]  Abnormal            Final result                 Please view results for these tests on the individual orders.   CBC with platelets and differential   Result Value Ref Range    WBC Count 8.1 4.0 - 11.0 10e3/uL    RBC Count 4.77 3.80 - 5.20 10e6/uL    Hemoglobin 14.4 11.7 - 15.7 g/dL    Hematocrit 43.2 35.0 - 47.0 %    MCV 91 78 - 100 fL    MCH 30.2 26.5 - 33.0 pg    MCHC 33.3 31.5 - 36.5 g/dL    RDW 13.3 10.0 - 15.0 %    Platelet Count 246 150 - 450 10e3/uL    % Neutrophils 59 %    % Lymphocytes 19 %    % Monocytes 8 %    % Eosinophils 14 %    % Basophils 1 %    % Immature Granulocytes 1 %    NRBCs per 100 WBC 0 <1 /100    Absolute Neutrophils 4.7 1.6 - 8.3 10e3/uL    Absolute Lymphocytes 1.6 0.8 - 5.3 10e3/uL    Absolute Monocytes 0.6 0.0 - 1.3 10e3/uL    Absolute Eosinophils 1.1 (H) 0.0 - 0.7 10e3/uL    Absolute Basophils 0.0 0.0 - 0.2 10e3/uL    Absolute Immature Granulocytes 0.0 <=0.4 10e3/uL    Absolute NRBCs 0.0 10e3/uL           Current Outpatient Medications:     cetirizine (ZYRTEC)  10 MG tablet, Take 1 tablet (10 mg) by mouth daily, Disp: 30 tablet, Rfl: 0    citalopram (CELEXA) 20 MG tablet, Take 1 tablet (20 mg) by mouth daily. - take a half tab for the first 1 week, Disp: 30 tablet, Rfl: 0    clopidogrel (PLAVIX) 75 MG tablet, TAKE 1 TABLET BY MOUTH DAILY, Disp: 100 tablet, Rfl: 2    lisinopril-hydrochlorothiazide (ZESTORETIC) 10-12.5 MG tablet, Take 1 tablet by mouth daily, Disp: 100 tablet, Rfl: 3    meclizine (ANTIVERT) 25 MG tablet, Take 1 tablet (25 mg) by mouth every 6 hours as needed for dizziness, Disp: 90 tablet, Rfl: 3    pantoprazole (PROTONIX) 40 MG EC tablet, Take 1 tablet (40 mg) by mouth daily., Disp: 100 tablet, Rfl: 3    potassium chloride beck ER (KLOR-CON M20) 20 MEQ CR tablet, TAKE 1 TABLET BY MOUTH DAILY, Disp: 100 tablet, Rfl: 0    rosuvastatin (CRESTOR) 5 MG tablet, TAKE 1 TABLET BY MOUTH DAILY, Disp: 100 tablet, Rfl: 2    verapamil ER (CALAN-SR) 180 MG CR tablet, TAKE 1 TABLET BY MOUTH TWICE  DAILY, Disp: 180 tablet, Rfl: 3    Signed Electronically by: Cinthya Mora CNP  {Email feedback regarding this note to [email or web address]   :[id number]}

## 2025-06-10 NOTE — TELEPHONE ENCOUNTER
"Right calf pain is moderate, on and off for weeks.  No other symptoms.    Should patient keep the appointment or go to [facility]?        Additional Information   Negative: Looks like a broken bone or dislocated joint (e.g., crooked or deformed)   Negative: Sounds like a life-threatening emergency to the triager    Answer Assessment - Initial Assessment Questions  1. ONSET: \"When did the pain start?\"       Not constant, usually at night for couple of weeks  2. LOCATION: \"Where is the pain located?\"       Right calf  3. PAIN: \"How bad is the pain?\"    (Scale 1-10; or mild, moderate, severe)      At night is worse, moderate.  Now is still moderate  4. WORK OR EXERCISE: \"Has there been any recent work or exercise that involved this part of the body?\"       No    5. CAUSE: \"What do you think is causing the leg pain?\"      NO idea  6. OTHER SYMPTOMS: \"Do you have any other symptoms?\" (e.g., chest pain, back pain, breathing difficulty, swelling, rash, fever, numbness, weakness)      No  7. PREGNANCY: \"Is there any chance you are pregnant?\" \"When was your last menstrual period?\"      NA    Protocols used: Leg Pain-A-[place]    Thais Phillips RN, BSN  [facility]    "

## 2025-06-10 NOTE — TELEPHONE ENCOUNTER
[facility] Reviewed and pt was accepted.  Called and spoke with Pt, pt will need arrange ride and will call the [facility] back to confirm what time works best.

## 2025-06-11 NOTE — TELEPHONE ENCOUNTER
Called to schedule. Declined scheduling at this time due to mom having too many visits, and the symptoms calming down. States if more symptoms arise, will call back with intentions of scheduling with Vidya.        David Dupree

## 2025-07-14 ENCOUNTER — THERAPY VISIT (OUTPATIENT)
Dept: OCCUPATIONAL THERAPY | Facility: CLINIC | Age: 85
End: 2025-07-14
Attending: NURSE PRACTITIONER
Payer: COMMERCIAL

## 2025-07-14 DIAGNOSIS — R41.3 MEMORY CHANGE: ICD-10-CM

## 2025-07-14 PROCEDURE — 97535 SELF CARE MNGMENT TRAINING: CPT | Mod: GO | Performed by: OCCUPATIONAL THERAPIST

## 2025-07-14 PROCEDURE — 97165 OT EVAL LOW COMPLEX 30 MIN: CPT | Mod: GO | Performed by: OCCUPATIONAL THERAPIST

## 2025-07-14 NOTE — PROGRESS NOTES
OCCUPATIONAL THERAPY EVALUATION  Type of Visit: Evaluation        Fall Risk Screen:  Have you fallen 2 or more times in the past year?: No  Have you fallen and had an injury in the past year?: No    Subjective      Condition type:  Initial onset (within last 3 months)  Cause of current episode:  Unspecified     Nature of treatment:  Rehabilitative  Functional ability:  Minimal functional limitations  Documented POC (choose all that apply):  Measurable short and long term/discharge treatment goals related to physical and functional deficits.;Frequency of treatment visits and treatment activities to address deficit areas.;Patient agrees to program participation including home program  Briefly describe symptoms:  decreased short term memory  How did the symptoms start:  progressive decline  Average pain/intensity last 24 hours:  0/10  Average pain/intensity past week:  0/10  Frequency of Symptoms:  Constantly (% of the time)  Symptom impact on ADLs:  Moderately  Condition change since eval:  N/A (first visit)  General health reported by patient:  Good     Presenting condition or subjective complaint: Requested of primary care physician for memory issues.  Seeing neurologist in November.  Date of onset: 07/14/25    Relevant medical history:     Dates & types of surgery:      Prior diagnostic imaging/testing results:       Prior therapy history for the same diagnosis, illness or injury: No        Living Environment  Social support: Alone   Type of home: Apartment/condo; 1 level   Stairs to enter the home: No       Ramp: No   Stairs inside the home: No       Help at home: Medication and/or finances; Assist for driving and community activities  Equipment owned: Straight Cane; Walker with wheels; Grab bars; Raised toilet seat; Bath bench     Employment: No    Hobbies/Interests: Word puzzles, solitaire, TV .    Patient goals for therapy:      Pain assessment: Pain denied     Objective     Cognitive Status  "Examination  Orientation: Person, Place   Level of Consciousness: Alert  Follows Commands and Answers Questions: 100% of the time  Personal Safety and Judgement: will continue to assess  Memory: Impaired  Attention: No deficits identified  Organization/Problem Solving: Problem solving impaired  Executive Function: Working memory impaired, decreased storage of information for performing tasks, Cognitive flexibility impaired, Self awareness/monitoring impaired      FUNCTIONAL MOBILITY  Assistive Device(s): None, has a walker but just uses it for long distances  Ambulation: indep    BED MOBILITY: Independent    TRANSFERS: Independent    BATHING: Independent  Equipment: Grab bar, Shower chair/Tub bench, only uses shower chair to sit and dry    UPPER BODY DRESSING: Independent    LOWER BODY DRESSING: Independent    TOILETING: Independent    GROOMING: Independent    EATING/SELF FEEDING: Independent     ACTIVITY TOLERANCE: good for daily activities    INSTRUMENTAL ACTIVITIES OF DAILY LIVING (IADL):   Meal Planning/Prep: only light cooking,  has cereal, eggs or toast for breakfast, goes to cafeteria for lunch, light supper (salad)  Home/Financial Management: dose own cleaning and laundry. Pays own bills, writes checks as soon as bills come.   Communication/Computer Use: Doesn't have a computer, uses cell phone. Calls daughters with phone.  Community Mobility: recently gave up her car, so she is not driving.  Medications: has a pill box, sets it up once a week. How do you remember to take meds? \"I just do.\"    [facility]    The [facility] ([facility] Mental Status Exam) is a 30-point standardized cognitive screen used to identify the presence of cognitive deficits and/or to identify a change in cognition over time.  This screen assesses cognitive abilities in various domains.  ([email or web address])    Patient's performance was as follows:    Introduction: WNL  Orientation and Attention: 2/3 (at first she didn't know what day of " the week it was, but then as she talked she figured out it was Monday because yesterday she went to [employer])  Memory: Immediate recall:   Calculation and Registration: 3/3  Category Naming with Time Contraint: 2/3  Memory Delayed Recall with Interference:   Registration and Digit Span:   Clock drawin/4 (Only added numbers on half of the clock, but they were in the right places. Hands were in the wrong place for the time; she had hour hand on 11 and minute hand on 12)  Visual Spatial:   Story Recall with Executive Function:      Total Score:     Scoring If High School Educated If Less than High School Educated   Normal 27-30 25-30   Mild Neurocognitive Disorder 21-26 20-24   Dementia - 1-       Assessment & Plan   CLINICAL IMPRESSIONS  Medical Diagnosis: Memory Change    Treatment Diagnosis: Memory loss    Impression/Assessment: Pt is a 85 year old female presenting to Occupational Therapy due to memory loss.  The following significant findings have been identified: Impaired cognition and Impaired safety/judgement.  These identified deficits interfere with their ability to perform recreational activities, household chores, medication management, and meal planning and preparation as compared to previous level of function.     Pt has a friend next door who helps her throughout the day, and daughter checks on her daily. She gets meals in dining room once a day. This seems appropriate at this time, but may need to re-assess her living situation if her friend can no longer assist her, or if she declines further. May benefit from CPT in future to help determine appropriate level of care. No OT needed at this time.     Clinical Decision Making (Complexity):  Assessment of Occupational Performance: 1-3 Performance Deficits  Occupational Performance Limitations: communication management, health management and maintenance, home establishment and management, meal preparation and cleanup, leisure  activities, and social participation  Clinical Decision Making (Complexity): Low complexity    PLAN OF CARE  Treatment Interventions:  Interventions: Self-Care/Home Management    Long Term Goals   OT Goal 1  Goal Identifier: Memory  Goal Description: Pt will instructed on  compensatory strategies for to make up for memory loss  in her daily life.  Rationale: In order to safely and appropriately apply compensatory strategies with ADL/IADL performance  Goal Progress: goal met  Target Date: 07/14/25  Date Met: 07/14/25      Frequency of Treatment: one time visit  Duration of Treatment: one time visit     Recommended Referrals to Other Professionals: Neuropsychology  Education Assessment: Learner/Method: Patient;Listening;Reading  Education Comments: Gave handout for memory tips     Risks and benefits of evaluation/treatment have been explained.   Patient/Family/caregiver agrees with Plan of Care.     Evaluation Time:    OT Eval, Low Complexity Minutes (68162): 30      Signing Clinician: HARMONY Brown [facility]                                                                                   OUTPATIENT OCCUPATIONAL THERAPY      PLAN OF TREATMENT FOR OUTPATIENT REHABILITATION   Patient's Last Name, First Name, JOSE Marinelli,June R YOB: 1940   Provider's Name   [facility]   Medical Record No.  [id number]     Onset Date: 07/14/25 Start of Care Date: 07/14/25     Medical Diagnosis:  Memory Change      OT Treatment Diagnosis:  Memory loss Plan of Treatment  Frequency/Duration:one time visit/one time visit    Certification date from 07/14/25   To 10/12/25        See note for plan of treatment details and functional goals     HARMONY Brown                         I CERTIFY THE NEED FOR THESE SERVICES FURNISHED UNDER        THIS PLAN OF TREATMENT AND WHILE UNDER MY CARE     (Physician attestation of this document indicates review and  certification of the therapy plan).              Referring Provider:  Claudia Dasilva    Initial Assessment  See Epic Evaluation- 07/14/25

## 2025-07-24 DIAGNOSIS — F41.9 ANXIETY: ICD-10-CM

## 2025-07-24 RX ORDER — CITALOPRAM HYDROBROMIDE 20 MG/1
TABLET ORAL
Qty: 30 TABLET | Refills: 0 | Status: SHIPPED | OUTPATIENT
Start: 2025-07-24

## 2025-08-07 ENCOUNTER — OFFICE VISIT (OUTPATIENT)
Dept: FAMILY MEDICINE | Facility: CLINIC | Age: 85
End: 2025-08-07
Attending: NURSE PRACTITIONER
Payer: COMMERCIAL

## 2025-08-07 VITALS
BODY MASS INDEX: 29.07 KG/M2 | WEIGHT: 154 LBS | OXYGEN SATURATION: 95 % | DIASTOLIC BLOOD PRESSURE: 75 MMHG | SYSTOLIC BLOOD PRESSURE: 114 MMHG | HEART RATE: 75 BPM | HEIGHT: 61 IN

## 2025-08-07 DIAGNOSIS — Z86.73 HISTORY OF TIA (TRANSIENT ISCHEMIC ATTACK): ICD-10-CM

## 2025-08-07 DIAGNOSIS — N18.32 STAGE 3B CHRONIC KIDNEY DISEASE (H): ICD-10-CM

## 2025-08-07 DIAGNOSIS — F41.9 ANXIETY: ICD-10-CM

## 2025-08-07 DIAGNOSIS — Z00.00 ENCOUNTER FOR MEDICARE ANNUAL WELLNESS EXAM: Primary | ICD-10-CM

## 2025-08-07 DIAGNOSIS — E78.2 MIXED HYPERLIPIDEMIA: ICD-10-CM

## 2025-08-07 DIAGNOSIS — I10 HYPERTENSION GOAL BP (BLOOD PRESSURE) < 150/90: Chronic | ICD-10-CM

## 2025-08-07 LAB
CHOLEST SERPL-MCNC: 149 MG/DL
CREAT UR-MCNC: 272 MG/DL
FASTING STATUS PATIENT QL REPORTED: ABNORMAL
HDLC SERPL-MCNC: 30 MG/DL
LDLC SERPL CALC-MCNC: 85 MG/DL
MICROALBUMIN UR-MCNC: 172 MG/L
MICROALBUMIN/CREAT UR: 63.24 MG/G CR (ref 0–25)
NONHDLC SERPL-MCNC: 119 MG/DL
PHOSPHATE SERPL-MCNC: 2.9 MG/DL (ref 2.5–4.5)
PTH-INTACT SERPL-MCNC: 34 PG/ML (ref 15–65)
TRIGL SERPL-MCNC: 172 MG/DL

## 2025-08-07 RX ORDER — ROSUVASTATIN CALCIUM 5 MG/1
TABLET, COATED ORAL
Qty: 100 TABLET | Refills: 3 | Status: SHIPPED | OUTPATIENT
Start: 2025-08-07

## 2025-08-07 RX ORDER — RISANKIZUMAB-RZAA 150 MG/ML
150 INJECTION SUBCUTANEOUS ONCE
COMMUNITY
Start: 2025-06-02

## 2025-08-07 RX ORDER — CITALOPRAM HYDROBROMIDE 20 MG/1
20 TABLET ORAL DAILY
Qty: 100 TABLET | Refills: 3 | Status: SHIPPED | OUTPATIENT
Start: 2025-08-07

## 2025-08-07 RX ORDER — POTASSIUM CHLORIDE 1500 MG/1
20 TABLET, EXTENDED RELEASE ORAL
Qty: 100 TABLET | Refills: 3 | Status: SHIPPED | OUTPATIENT
Start: 2025-08-07

## 2025-08-07 SDOH — HEALTH STABILITY: PHYSICAL HEALTH: ON AVERAGE, HOW MANY DAYS PER WEEK DO YOU ENGAGE IN MODERATE TO STRENUOUS EXERCISE (LIKE A BRISK WALK)?: 0 DAYS

## 2025-08-07 ASSESSMENT — PATIENT HEALTH QUESTIONNAIRE - PHQ9
SUM OF ALL RESPONSES TO PHQ QUESTIONS 1-9: 1
10. IF YOU CHECKED OFF ANY PROBLEMS, HOW DIFFICULT HAVE THESE PROBLEMS MADE IT FOR YOU TO DO YOUR WORK, TAKE CARE OF THINGS AT HOME, OR GET ALONG WITH OTHER PEOPLE: NOT DIFFICULT AT ALL
SUM OF ALL RESPONSES TO PHQ QUESTIONS 1-9: 1

## 2025-08-07 ASSESSMENT — SOCIAL DETERMINANTS OF HEALTH (SDOH): HOW OFTEN DO YOU GET TOGETHER WITH FRIENDS OR RELATIVES?: MORE THAN THREE TIMES A WEEK

## 2025-08-07 ASSESSMENT — PAIN SCALES - GENERAL: PAINLEVEL_OUTOF10: NO PAIN (0)

## 2025-08-30 DIAGNOSIS — Z86.73 HISTORY OF TIA (TRANSIENT ISCHEMIC ATTACK): ICD-10-CM

## 2025-09-02 RX ORDER — CLOPIDOGREL BISULFATE 75 MG/1
75 TABLET ORAL DAILY
Qty: 100 TABLET | Refills: 2 | Status: SHIPPED | OUTPATIENT
Start: 2025-09-02